# Patient Record
Sex: MALE | Race: WHITE | NOT HISPANIC OR LATINO | Employment: OTHER | URBAN - METROPOLITAN AREA
[De-identification: names, ages, dates, MRNs, and addresses within clinical notes are randomized per-mention and may not be internally consistent; named-entity substitution may affect disease eponyms.]

---

## 2017-01-04 ENCOUNTER — ALLSCRIPTS OFFICE VISIT (OUTPATIENT)
Dept: WOUND CARE | Facility: HOSPITAL | Age: 52
End: 2017-01-04
Payer: COMMERCIAL

## 2017-01-04 PROCEDURE — 11042 DBRDMT SUBQ TIS 1ST 20SQCM/<: CPT | Performed by: REGISTERED NURSE

## 2017-01-06 ENCOUNTER — GENERIC CONVERSION - ENCOUNTER (OUTPATIENT)
Dept: OTHER | Facility: OTHER | Age: 52
End: 2017-01-06

## 2017-01-11 ENCOUNTER — ALLSCRIPTS OFFICE VISIT (OUTPATIENT)
Dept: OTHER | Facility: OTHER | Age: 52
End: 2017-01-11

## 2017-01-12 DIAGNOSIS — M18.9 OSTEOARTHRITIS OF FIRST CARPOMETACARPAL JOINT: ICD-10-CM

## 2017-01-16 ENCOUNTER — ALLSCRIPTS OFFICE VISIT (OUTPATIENT)
Dept: WOUND CARE | Facility: HOSPITAL | Age: 52
End: 2017-01-16
Payer: COMMERCIAL

## 2017-01-16 PROCEDURE — 99212 OFFICE O/P EST SF 10 MIN: CPT | Performed by: SURGERY

## 2017-01-26 ENCOUNTER — APPOINTMENT (OUTPATIENT)
Dept: OCCUPATIONAL THERAPY | Facility: CLINIC | Age: 52
End: 2017-01-26
Payer: COMMERCIAL

## 2017-01-26 ENCOUNTER — GENERIC CONVERSION - ENCOUNTER (OUTPATIENT)
Dept: OTHER | Facility: OTHER | Age: 52
End: 2017-01-26

## 2017-01-26 PROCEDURE — L3913 HFO W/O JOINTS CF: HCPCS

## 2017-01-26 PROCEDURE — 97165 OT EVAL LOW COMPLEX 30 MIN: CPT

## 2017-01-31 ENCOUNTER — APPOINTMENT (OUTPATIENT)
Dept: OCCUPATIONAL THERAPY | Facility: CLINIC | Age: 52
End: 2017-01-31
Payer: COMMERCIAL

## 2017-01-31 PROCEDURE — 97110 THERAPEUTIC EXERCISES: CPT

## 2017-01-31 PROCEDURE — 97140 MANUAL THERAPY 1/> REGIONS: CPT

## 2017-01-31 PROCEDURE — 97010 HOT OR COLD PACKS THERAPY: CPT

## 2017-02-02 ENCOUNTER — APPOINTMENT (OUTPATIENT)
Dept: OCCUPATIONAL THERAPY | Facility: CLINIC | Age: 52
End: 2017-02-02
Payer: COMMERCIAL

## 2017-02-02 PROCEDURE — 97140 MANUAL THERAPY 1/> REGIONS: CPT

## 2017-02-02 PROCEDURE — 97110 THERAPEUTIC EXERCISES: CPT

## 2017-02-02 PROCEDURE — 97010 HOT OR COLD PACKS THERAPY: CPT

## 2017-02-07 ENCOUNTER — APPOINTMENT (OUTPATIENT)
Dept: OCCUPATIONAL THERAPY | Facility: CLINIC | Age: 52
End: 2017-02-07
Payer: COMMERCIAL

## 2017-02-07 PROCEDURE — 97110 THERAPEUTIC EXERCISES: CPT

## 2017-02-07 PROCEDURE — 97010 HOT OR COLD PACKS THERAPY: CPT

## 2017-02-07 PROCEDURE — 97140 MANUAL THERAPY 1/> REGIONS: CPT

## 2017-02-09 ENCOUNTER — APPOINTMENT (OUTPATIENT)
Dept: OCCUPATIONAL THERAPY | Facility: CLINIC | Age: 52
End: 2017-02-09
Payer: COMMERCIAL

## 2017-02-10 ENCOUNTER — APPOINTMENT (OUTPATIENT)
Dept: OCCUPATIONAL THERAPY | Facility: CLINIC | Age: 52
End: 2017-02-10
Payer: COMMERCIAL

## 2017-02-10 PROCEDURE — 97140 MANUAL THERAPY 1/> REGIONS: CPT

## 2017-02-10 PROCEDURE — 97110 THERAPEUTIC EXERCISES: CPT

## 2017-02-13 ENCOUNTER — ALLSCRIPTS OFFICE VISIT (OUTPATIENT)
Dept: OTHER | Facility: OTHER | Age: 52
End: 2017-02-13

## 2017-02-14 ENCOUNTER — APPOINTMENT (OUTPATIENT)
Dept: OCCUPATIONAL THERAPY | Facility: CLINIC | Age: 52
End: 2017-02-14
Payer: COMMERCIAL

## 2017-02-14 PROCEDURE — 97110 THERAPEUTIC EXERCISES: CPT

## 2017-02-14 PROCEDURE — 97010 HOT OR COLD PACKS THERAPY: CPT

## 2017-02-14 PROCEDURE — 97140 MANUAL THERAPY 1/> REGIONS: CPT

## 2017-02-15 ENCOUNTER — GENERIC CONVERSION - ENCOUNTER (OUTPATIENT)
Dept: OTHER | Facility: OTHER | Age: 52
End: 2017-02-15

## 2017-02-16 ENCOUNTER — APPOINTMENT (OUTPATIENT)
Dept: OCCUPATIONAL THERAPY | Facility: CLINIC | Age: 52
End: 2017-02-16
Payer: COMMERCIAL

## 2017-02-22 ENCOUNTER — ALLSCRIPTS OFFICE VISIT (OUTPATIENT)
Dept: OTHER | Facility: OTHER | Age: 52
End: 2017-02-22

## 2017-02-22 ENCOUNTER — HOSPITAL ENCOUNTER (OUTPATIENT)
Dept: RADIOLOGY | Facility: CLINIC | Age: 52
Discharge: HOME | End: 2017-02-22
Payer: COMMERCIAL

## 2017-02-22 DIAGNOSIS — M79.642 PAIN OF LEFT HAND: ICD-10-CM

## 2017-02-22 DIAGNOSIS — M18.9 OSTEOARTHRITIS OF FIRST CARPOMETACARPAL JOINT: ICD-10-CM

## 2017-02-22 DIAGNOSIS — M79.641 PAIN OF RIGHT HAND: ICD-10-CM

## 2017-02-22 DIAGNOSIS — Z79.899 OTHER LONG TERM (CURRENT) DRUG THERAPY: ICD-10-CM

## 2017-02-22 DIAGNOSIS — F33.2 MAJOR DEPRESSIVE DISORDER, RECURRENT SEVERE WITHOUT PSYCHOTIC FEATURES (HCC): ICD-10-CM

## 2017-02-22 PROCEDURE — 73140 X-RAY EXAM OF FINGER(S): CPT

## 2017-02-28 ENCOUNTER — ALLSCRIPTS OFFICE VISIT (OUTPATIENT)
Dept: OTHER | Facility: OTHER | Age: 52
End: 2017-02-28

## 2017-03-07 ENCOUNTER — GENERIC CONVERSION - ENCOUNTER (OUTPATIENT)
Dept: OTHER | Facility: OTHER | Age: 52
End: 2017-03-07

## 2017-03-08 ENCOUNTER — GENERIC CONVERSION - ENCOUNTER (OUTPATIENT)
Dept: OTHER | Facility: OTHER | Age: 52
End: 2017-03-08

## 2017-03-22 ENCOUNTER — ALLSCRIPTS OFFICE VISIT (OUTPATIENT)
Dept: OTHER | Facility: OTHER | Age: 52
End: 2017-03-22

## 2017-03-30 ENCOUNTER — GENERIC CONVERSION - ENCOUNTER (OUTPATIENT)
Dept: OTHER | Facility: OTHER | Age: 52
End: 2017-03-30

## 2017-03-31 ENCOUNTER — GENERIC CONVERSION - ENCOUNTER (OUTPATIENT)
Dept: OTHER | Facility: OTHER | Age: 52
End: 2017-03-31

## 2017-04-11 ENCOUNTER — ALLSCRIPTS OFFICE VISIT (OUTPATIENT)
Dept: OTHER | Facility: OTHER | Age: 52
End: 2017-04-11

## 2017-04-11 DIAGNOSIS — F33.2 MAJOR DEPRESSIVE DISORDER, RECURRENT SEVERE WITHOUT PSYCHOTIC FEATURES (HCC): ICD-10-CM

## 2017-04-11 DIAGNOSIS — Z79.899 OTHER LONG TERM (CURRENT) DRUG THERAPY: ICD-10-CM

## 2017-04-20 ENCOUNTER — GENERIC CONVERSION - ENCOUNTER (OUTPATIENT)
Dept: OTHER | Facility: OTHER | Age: 52
End: 2017-04-20

## 2017-05-09 ENCOUNTER — ALLSCRIPTS OFFICE VISIT (OUTPATIENT)
Dept: OTHER | Facility: OTHER | Age: 52
End: 2017-05-09

## 2017-05-22 ENCOUNTER — APPOINTMENT (OUTPATIENT)
Dept: LAB | Facility: CLINIC | Age: 52
End: 2017-05-22
Payer: COMMERCIAL

## 2017-05-22 ENCOUNTER — ALLSCRIPTS OFFICE VISIT (OUTPATIENT)
Dept: OTHER | Facility: OTHER | Age: 52
End: 2017-05-22

## 2017-05-22 DIAGNOSIS — Z79.899 OTHER LONG TERM (CURRENT) DRUG THERAPY: ICD-10-CM

## 2017-05-22 DIAGNOSIS — F33.2 MAJOR DEPRESSIVE DISORDER, RECURRENT SEVERE WITHOUT PSYCHOTIC FEATURES (HCC): ICD-10-CM

## 2017-05-22 LAB
ALBUMIN SERPL BCP-MCNC: 3.6 G/DL (ref 3.5–5)
ALP SERPL-CCNC: 69 U/L (ref 46–116)
ALT SERPL W P-5'-P-CCNC: 27 U/L (ref 12–78)
ANION GAP SERPL CALCULATED.3IONS-SCNC: 9 MMOL/L (ref 4–13)
AST SERPL W P-5'-P-CCNC: 20 U/L (ref 5–45)
BASOPHILS # BLD AUTO: 0.03 THOUSANDS/ΜL (ref 0–0.1)
BASOPHILS NFR BLD AUTO: 1 % (ref 0–1)
BILIRUB SERPL-MCNC: 0.9 MG/DL (ref 0.2–1)
BUN SERPL-MCNC: 20 MG/DL (ref 5–25)
CALCIUM SERPL-MCNC: 8.4 MG/DL (ref 8.3–10.1)
CHLORIDE SERPL-SCNC: 104 MMOL/L (ref 100–108)
CHOLEST SERPL-MCNC: 160 MG/DL (ref 50–200)
CO2 SERPL-SCNC: 26 MMOL/L (ref 21–32)
CREAT SERPL-MCNC: 1.21 MG/DL (ref 0.6–1.3)
EOSINOPHIL # BLD AUTO: 0.13 THOUSAND/ΜL (ref 0–0.61)
EOSINOPHIL NFR BLD AUTO: 2 % (ref 0–6)
ERYTHROCYTE [DISTWIDTH] IN BLOOD BY AUTOMATED COUNT: 13.3 % (ref 11.6–15.1)
GFR SERPL CREATININE-BSD FRML MDRD: >60 ML/MIN/1.73SQ M
GLUCOSE P FAST SERPL-MCNC: 103 MG/DL (ref 65–99)
HCT VFR BLD AUTO: 42.2 % (ref 36.5–49.3)
HDLC SERPL-MCNC: 35 MG/DL (ref 40–60)
HGB BLD-MCNC: 14.5 G/DL (ref 12–17)
LDLC SERPL CALC-MCNC: 99 MG/DL (ref 0–100)
LYMPHOCYTES # BLD AUTO: 1.98 THOUSANDS/ΜL (ref 0.6–4.47)
LYMPHOCYTES NFR BLD AUTO: 30 % (ref 14–44)
MCH RBC QN AUTO: 29.2 PG (ref 26.8–34.3)
MCHC RBC AUTO-ENTMCNC: 34.4 G/DL (ref 31.4–37.4)
MCV RBC AUTO: 85 FL (ref 82–98)
MONOCYTES # BLD AUTO: 0.64 THOUSAND/ΜL (ref 0.17–1.22)
MONOCYTES NFR BLD AUTO: 10 % (ref 4–12)
NEUTROPHILS # BLD AUTO: 3.87 THOUSANDS/ΜL (ref 1.85–7.62)
NEUTS SEG NFR BLD AUTO: 57 % (ref 43–75)
PLATELET # BLD AUTO: 264 THOUSANDS/UL (ref 149–390)
PMV BLD AUTO: 9.4 FL (ref 8.9–12.7)
POTASSIUM SERPL-SCNC: 4 MMOL/L (ref 3.5–5.3)
PROT SERPL-MCNC: 7 G/DL (ref 6.4–8.2)
RBC # BLD AUTO: 4.97 MILLION/UL (ref 3.88–5.62)
SODIUM SERPL-SCNC: 139 MMOL/L (ref 136–145)
TRIGL SERPL-MCNC: 131 MG/DL
TSH SERPL DL<=0.05 MIU/L-ACNC: 2.57 UIU/ML (ref 0.36–3.74)
WBC # BLD AUTO: 6.65 THOUSAND/UL (ref 4.31–10.16)

## 2017-05-22 PROCEDURE — 80053 COMPREHEN METABOLIC PANEL: CPT

## 2017-05-22 PROCEDURE — 85025 COMPLETE CBC W/AUTO DIFF WBC: CPT

## 2017-05-22 PROCEDURE — 84443 ASSAY THYROID STIM HORMONE: CPT

## 2017-05-22 PROCEDURE — 80061 LIPID PANEL: CPT

## 2017-05-22 PROCEDURE — 36415 COLL VENOUS BLD VENIPUNCTURE: CPT

## 2017-05-24 ENCOUNTER — GENERIC CONVERSION - ENCOUNTER (OUTPATIENT)
Dept: OTHER | Facility: OTHER | Age: 52
End: 2017-05-24

## 2017-05-26 ENCOUNTER — ALLSCRIPTS OFFICE VISIT (OUTPATIENT)
Dept: OTHER | Facility: OTHER | Age: 52
End: 2017-05-26

## 2017-05-30 ENCOUNTER — ALLSCRIPTS OFFICE VISIT (OUTPATIENT)
Dept: RADIOLOGY | Facility: CLINIC | Age: 52
End: 2017-05-30
Payer: COMMERCIAL

## 2017-06-08 ENCOUNTER — ALLSCRIPTS OFFICE VISIT (OUTPATIENT)
Dept: OTHER | Facility: OTHER | Age: 52
End: 2017-06-08

## 2017-06-15 ENCOUNTER — GENERIC CONVERSION - ENCOUNTER (OUTPATIENT)
Dept: OTHER | Facility: OTHER | Age: 52
End: 2017-06-15

## 2017-06-23 ENCOUNTER — GENERIC CONVERSION - ENCOUNTER (OUTPATIENT)
Dept: OTHER | Facility: OTHER | Age: 52
End: 2017-06-23

## 2017-07-13 ENCOUNTER — ALLSCRIPTS OFFICE VISIT (OUTPATIENT)
Dept: OTHER | Facility: OTHER | Age: 52
End: 2017-07-13

## 2017-07-27 ENCOUNTER — ALLSCRIPTS OFFICE VISIT (OUTPATIENT)
Dept: OTHER | Facility: OTHER | Age: 52
End: 2017-07-27

## 2017-07-28 ENCOUNTER — GENERIC CONVERSION - ENCOUNTER (OUTPATIENT)
Dept: OTHER | Facility: OTHER | Age: 52
End: 2017-07-28

## 2017-08-09 ENCOUNTER — ALLSCRIPTS OFFICE VISIT (OUTPATIENT)
Dept: OTHER | Facility: OTHER | Age: 52
End: 2017-08-09

## 2017-08-09 DIAGNOSIS — K42.9 UMBILICAL HERNIA WITHOUT OBSTRUCTION OR GANGRENE: ICD-10-CM

## 2017-08-09 DIAGNOSIS — E66.01 MORBID (SEVERE) OBESITY DUE TO EXCESS CALORIES (HCC): ICD-10-CM

## 2017-08-09 DIAGNOSIS — G56.03 BILATERAL CARPAL TUNNEL SYNDROME: ICD-10-CM

## 2017-08-22 ENCOUNTER — ANESTHESIA EVENT (OUTPATIENT)
Dept: GASTROENTEROLOGY | Facility: HOSPITAL | Age: 52
End: 2017-08-22
Payer: COMMERCIAL

## 2017-08-23 ENCOUNTER — HOSPITAL ENCOUNTER (OUTPATIENT)
Facility: HOSPITAL | Age: 52
Setting detail: OUTPATIENT SURGERY
Discharge: HOME | End: 2017-08-23
Attending: SURGERY | Admitting: SURGERY
Payer: COMMERCIAL

## 2017-08-23 ENCOUNTER — APPOINTMENT (OUTPATIENT)
Dept: LAB | Facility: HOSPITAL | Age: 52
End: 2017-08-23
Attending: SURGERY
Payer: COMMERCIAL

## 2017-08-23 ENCOUNTER — ANESTHESIA (OUTPATIENT)
Dept: GASTROENTEROLOGY | Facility: HOSPITAL | Age: 52
End: 2017-08-23
Payer: COMMERCIAL

## 2017-08-23 VITALS
TEMPERATURE: 98.2 F | OXYGEN SATURATION: 98 % | SYSTOLIC BLOOD PRESSURE: 104 MMHG | HEART RATE: 82 BPM | RESPIRATION RATE: 14 BRPM | DIASTOLIC BLOOD PRESSURE: 61 MMHG

## 2017-08-23 DIAGNOSIS — E66.01 MORBID (SEVERE) OBESITY DUE TO EXCESS CALORIES (HCC): ICD-10-CM

## 2017-08-23 DIAGNOSIS — K42.9 UMBILICAL HERNIA WITHOUT OBSTRUCTION OR GANGRENE: ICD-10-CM

## 2017-08-23 DIAGNOSIS — G56.03 BILATERAL CARPAL TUNNEL SYNDROME: ICD-10-CM

## 2017-08-23 LAB
ALBUMIN SERPL BCP-MCNC: 3.4 G/DL (ref 3.5–5)
ALP SERPL-CCNC: 68 U/L (ref 46–116)
ALT SERPL W P-5'-P-CCNC: 31 U/L (ref 12–78)
ANION GAP SERPL CALCULATED.3IONS-SCNC: 6 MMOL/L (ref 4–13)
AST SERPL W P-5'-P-CCNC: 27 U/L (ref 5–45)
BILIRUB SERPL-MCNC: 0.53 MG/DL (ref 0.2–1)
BUN SERPL-MCNC: 19 MG/DL (ref 5–25)
CALCIUM SERPL-MCNC: 8.5 MG/DL (ref 8.3–10.1)
CHLORIDE SERPL-SCNC: 106 MMOL/L (ref 100–108)
CHOLEST SERPL-MCNC: 157 MG/DL (ref 50–200)
CO2 SERPL-SCNC: 28 MMOL/L (ref 21–32)
CREAT SERPL-MCNC: 1.15 MG/DL (ref 0.6–1.3)
ERYTHROCYTE [DISTWIDTH] IN BLOOD BY AUTOMATED COUNT: 13.4 % (ref 11.6–15.1)
GFR SERPL CREATININE-BSD FRML MDRD: 74 ML/MIN/1.73SQ M
GLUCOSE P FAST SERPL-MCNC: 104 MG/DL (ref 65–99)
HCT VFR BLD AUTO: 41.9 % (ref 36.5–49.3)
HDLC SERPL-MCNC: 27 MG/DL (ref 40–60)
HGB BLD-MCNC: 14 G/DL (ref 12–17)
LDLC SERPL CALC-MCNC: 95 MG/DL (ref 0–100)
MCH RBC QN AUTO: 29.1 PG (ref 26.8–34.3)
MCHC RBC AUTO-ENTMCNC: 33.4 G/DL (ref 31.4–37.4)
MCV RBC AUTO: 87 FL (ref 82–98)
PLATELET # BLD AUTO: 278 THOUSANDS/UL (ref 149–390)
PMV BLD AUTO: 10 FL (ref 8.9–12.7)
POTASSIUM SERPL-SCNC: 4.5 MMOL/L (ref 3.5–5.3)
PROT SERPL-MCNC: 7 G/DL (ref 6.4–8.2)
RBC # BLD AUTO: 4.81 MILLION/UL (ref 3.88–5.62)
SODIUM SERPL-SCNC: 140 MMOL/L (ref 136–145)
TRIGL SERPL-MCNC: 174 MG/DL
TSH SERPL DL<=0.05 MIU/L-ACNC: 1.91 UIU/ML (ref 0.36–3.74)
WBC # BLD AUTO: 7.24 THOUSAND/UL (ref 4.31–10.16)

## 2017-08-23 PROCEDURE — 80061 LIPID PANEL: CPT

## 2017-08-23 PROCEDURE — 88342 IMHCHEM/IMCYTCHM 1ST ANTB: CPT | Performed by: SURGERY

## 2017-08-23 PROCEDURE — 88305 TISSUE EXAM BY PATHOLOGIST: CPT | Performed by: SURGERY

## 2017-08-23 PROCEDURE — 36415 COLL VENOUS BLD VENIPUNCTURE: CPT

## 2017-08-23 PROCEDURE — 80053 COMPREHEN METABOLIC PANEL: CPT

## 2017-08-23 PROCEDURE — 84443 ASSAY THYROID STIM HORMONE: CPT

## 2017-08-23 PROCEDURE — 85027 COMPLETE CBC AUTOMATED: CPT

## 2017-08-23 RX ORDER — LIDOCAINE HYDROCHLORIDE 10 MG/ML
INJECTION, SOLUTION INFILTRATION; PERINEURAL AS NEEDED
Status: DISCONTINUED | OUTPATIENT
Start: 2017-08-23 | End: 2017-08-23 | Stop reason: SURG

## 2017-08-23 RX ORDER — SODIUM CHLORIDE 9 MG/ML
125 INJECTION, SOLUTION INTRAVENOUS CONTINUOUS
Status: DISCONTINUED | OUTPATIENT
Start: 2017-08-23 | End: 2017-08-23 | Stop reason: HOSPADM

## 2017-08-23 RX ORDER — PROPOFOL 10 MG/ML
INJECTION, EMULSION INTRAVENOUS AS NEEDED
Status: DISCONTINUED | OUTPATIENT
Start: 2017-08-23 | End: 2017-08-23 | Stop reason: SURG

## 2017-08-23 RX ADMIN — PROPOFOL 200 MG: 10 INJECTION, EMULSION INTRAVENOUS at 11:18

## 2017-08-23 RX ADMIN — PROPOFOL 50 MG: 10 INJECTION, EMULSION INTRAVENOUS at 11:20

## 2017-08-23 RX ADMIN — SODIUM CHLORIDE 125 ML/HR: 0.9 INJECTION, SOLUTION INTRAVENOUS at 10:52

## 2017-08-23 RX ADMIN — LIDOCAINE HYDROCHLORIDE 100 MG: 10 INJECTION, SOLUTION INFILTRATION; PERINEURAL at 11:18

## 2017-08-30 ENCOUNTER — APPOINTMENT (OUTPATIENT)
Dept: LAB | Facility: CLINIC | Age: 52
End: 2017-08-30
Payer: COMMERCIAL

## 2017-08-30 ENCOUNTER — ALLSCRIPTS OFFICE VISIT (OUTPATIENT)
Dept: OTHER | Facility: OTHER | Age: 52
End: 2017-08-30

## 2017-08-30 ENCOUNTER — TRANSCRIBE ORDERS (OUTPATIENT)
Dept: LAB | Facility: CLINIC | Age: 52
End: 2017-08-30

## 2017-08-30 DIAGNOSIS — F33.2 MAJOR DEPRESSIVE DISORDER, RECURRENT SEVERE WITHOUT PSYCHOTIC FEATURES (HCC): Primary | ICD-10-CM

## 2017-08-30 DIAGNOSIS — F41.1 GAD (GENERALIZED ANXIETY DISORDER): ICD-10-CM

## 2017-08-30 DIAGNOSIS — Z13.9 SCREENING FOR CONDITION: Primary | ICD-10-CM

## 2017-08-30 DIAGNOSIS — G47.09 OTHER ORGANIC INSOMNIA: ICD-10-CM

## 2017-08-30 DIAGNOSIS — Z13.9 SCREENING FOR CONDITION: ICD-10-CM

## 2017-08-30 LAB
EST. AVERAGE GLUCOSE BLD GHB EST-MCNC: 114 MG/DL
HBA1C MFR BLD: 5.6 % (ref 4.2–6.3)

## 2017-08-30 PROCEDURE — 83036 HEMOGLOBIN GLYCOSYLATED A1C: CPT

## 2017-08-30 PROCEDURE — 36415 COLL VENOUS BLD VENIPUNCTURE: CPT

## 2017-09-13 ENCOUNTER — GENERIC CONVERSION - ENCOUNTER (OUTPATIENT)
Dept: OTHER | Facility: OTHER | Age: 52
End: 2017-09-13

## 2017-09-14 ENCOUNTER — GENERIC CONVERSION - ENCOUNTER (OUTPATIENT)
Dept: OTHER | Facility: OTHER | Age: 52
End: 2017-09-14

## 2017-10-25 ENCOUNTER — GENERIC CONVERSION - ENCOUNTER (OUTPATIENT)
Dept: OTHER | Facility: OTHER | Age: 52
End: 2017-10-25

## 2018-01-11 NOTE — PSYCH
Psych Med Mgmt    Appearance: was calm and cooperative, adequate hygiene and grooming, demonstrated behavior psychomotor retardation and good eye contact  Observed mood: depressed and anxious  Observed mood: affect was blunted  Speech: slowed, but speech soft  Thought processes: coherent/organized  Hallucinations: no hallucinations present  Thought Content: no delusions  Abnormal Thoughts: The patient has no suicidal thoughts and no homicidal thoughts  Orientation: The patient is oriented to person, place and time  Recent and Remote Memory: short term memory intact and long term memory intact  Attention Span And Concentration: concentration impaired  Insight: Insight intact  Judgment: His judgment was intact  Muscle Strength And Tone  Muscle strength and tone were normal  Normal gait and station  Language: no difficulty naming common objects, no difficulty repeating a phrase and no difficulty writing a sentence  Fund of knowledge: Patient displays adequate knowledge of current events, adequate fund of knowledge regarding past history and adequate fund of knowledge regarding vocabulary  The patient is experiencing moderate to severe pain  On a scale of 0 - 10 the pain severity is a 6  Treatment Recommendations: Continue Wellbutrin  mg daily  Continue Lexapro 20 mg daily  Patient will again consider trial of Abilify 2 mg at bedtime to help with depression and nightmares - has new insurance now  Informed we will squires to complete Prior Authorization if needed  Continue Ambien 10 mg at bedtime as needed  Therapy with own therapist  Follows with PCP for lipid and glucose monitoring  Abdominal circumference is 53 inches today  Risks, Benefits And Possible Side Effects Of Medications: Risks, benefits, and possible side effects of medications explained to patient and patient verbalizes understanding                   The patient has been filling controlled prescriptions on time as prescribed to [facility]       He reports normal appetite, decreased energy, recent 5 lbs weight loss and normal number of sleep hours  Elli Chavez states he still has been feeling depressed on and off  Frustrated with ongoing chronic pain, overwhelmed with paperwork from his  regarding his past termination from work  Feels anxious often  Denies suicidal or homicidal ideation  No psychotic symptoms  Sleep fluctuates, has restless legs at night  Occasional nightmares  Appetite is good  Involved with bariatric program   No side effects from medications reported  Was not able to get Abilify filled due to his insurance not covering Abilify  PHQ-9 is increased today at 17 (from 17 at the last visit)  Labs reviewed from 5/22/2017 - lipid panel significant for low HDL of 35; TSH is normal, CBC is normal; CMP significant for slightly elevated glucose of 103  Vitals  Signs   Recorded: 93CUB6905 02:35PM   Heart Rate: 89  Systolic: 331  Diastolic: 73  BP Cuff Size: Large  Height: 5 ft 5 in  Weight: 290 lb   BMI Calculated: 48 26  BSA Calculated: 2 32    Assessment    1  ALYSHA (generalized anxiety disorder) (300 02) (F41 1)   2  Major depressive disorder, recurrent, severe without psychotic behavior (296 33) (F33 2)   3  Other insomnia (780 52) (G47 09)    Plan    1  Follow-up visit in 3 weeks Evaluation and Treatment  Follow-up  Status: Hold For -   Scheduling  Requested for: 11CXG1194    Review of Systems    Constitutional: feeling tired and recent 5 lb weight loss  Cardiovascular: no complaints of slow or fast heart rate, no chest pain, no palpitations  Respiratory: no complaints of shortness of breath, no wheezing, no dyspnea on exertion  Gastrointestinal: abdominal pain  Genitourinary: no complaints of dysuria, no incontinence, no pelvic pain, no urinary frequency  Musculoskeletal: rib pain  Integumentary: no complaints of skin rash, no itching, no dry skin  Neurological: no complaints of headache, no confusion, no numbness, no dizziness  Past Psychiatric History    Past Psychiatric History: History of depression since 2006  No history of inpatient psychiatric admissions  Was in treatment with Dr Kj Maddox for over 1 year and with PCP for several years prior to that  Has a therapist   No history of past suicide attempts  Past medication trials with Wellbutrin, Lexapro, Ambien  Substance Abuse Hx    Substance Abuse History: Social alcohol use 1 drink per month  No recreational drug use  No tobacco           Active Problems    1  Adrenal adenoma (227 0) (D35 00)   2  Bilateral carpal tunnel syndrome (354 0) (G56 03)   3  Carpal tunnel syndrome of right wrist (354 0) (G56 01)   4  Chronic right-sided thoracic back pain (724 1,338 29) (M54 6,G89 29)   5  CMC arthritis, thumb, degenerative (715 34) (M18 9)   6  Cubital tunnel syndrome (354 2) (G56 20)   7  Cubital tunnel syndrome of both upper extremities (354 2) (G56 23)   8  Elbow pain (719 42) (M25 529)   9  ALYSHA (generalized anxiety disorder) (300 02) (F41 1)   10  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)   11  Long term current use of therapeutic drug (V58 69) (Z79 899)   12  Lumbago (724 2) (M54 5)   13  Major depressive disorder, recurrent, severe without psychotic behavior (296 33) (F33 2)   14  Median nerve neuritis, unspecified laterality (354 1) (G56 10)   15  Morbid obesity (278 01) (E66 01)   16  Myofascial pain syndrome (729 1) (M79 1)   17  Other insomnia (780 52) (G47 09)   18  Pain in both hands (729 5) (M79 641,M79 642)   19  Postoperative examination (V67 00) (Z09)   20  Postoperative wound dehiscence, initial encounter (998 32) (T81 31XA)   21  Postoperative wound dehiscence, subsequent encounter (V58 89,998 32) (T81 31XD)   22  Pre-operative clearance (V72 84) (Z01 818)   23  Sleep apnea (780 57) (G47 30)   24  Thoracic compression fracture (805 2) (S22 000A)   25   Tinea capitis (110 0) (B35 0)   26  Ulnar 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 of left upper extremity (354 2) (G56 22)   27  Umbilical hernia (462 5) (K42 9)   28  Vitamin D deficiency (268 9) (E55 9)    Past Medical History    1  History of Anxiety (300 00) (F41 9)   2  History of depression (V11 8) (Z86 59)   3  History of folliculitis (V07 2) (I56 9)   4  Denied: History of head injury   5  History of Knee pain (719 46) (M25 569)   6  History of Liver lesion (573 8) (K76 9)   7  History of Neck pain (723 1) (M54 2)   8  History of Nodular adrenal cortex (255 8) (E27 8)   9  History of Pain, wrist joint (719 43) (M25 539)   10  Denied: History of Seizures   11  History of Skin lesion of scalp (709 9) (L98 9)   12  History of Status post hernia repair (V45 89) (Z98 890,Z87 19)    The active problems and past medical history were reviewed and updated today  Allergies    1  Penicillins    Current Meds   1  ARIPiprazole 2 MG Oral Tablet; TAKE 1 TABLET AT BEDTIME; Therapy: 68Cni6592 to (Evaluate:09Aug2017)  Requested for: 01Rof9085; Last   Rx:11Apr2017 Ordered   2  BuPROPion HCl ER (XL) 150 MG Oral Tablet Extended Release 24 Hour; take 3 tablets   daily; Therapy: 11RLP7497 to (Evaluate:09Aug2017)  Requested for: 58Lxg3857; Last   Rx:39Yec9464 Ordered   3  Escitalopram Oxalate 20 MG Oral Tablet; TAKE 1 TABLET DAILY; Therapy: 76ZAG7944 to (Evaluate:95Uyc7778)  Requested for: 64Hnm3029; Last   Rx:35Vrb5542 Ordered   4  Vitamin B6 200 MG Oral Tablet; TAKE 1 TABLET DAILY AS DIRECTED; Therapy: 47DDE8282 to (Evaluate:14Rjy4478)  Requested for: 09CTJ2618; Last   SY:71MTB5263 Ordered   5  Vitamin D (Ergocalciferol) 36413 UNIT Oral Capsule; TAKE 1 CAPSULE WEEKLY; Therapy: 10MFV1629 to (Evaluate:28Jun2017)  Requested for: 45GSL5119; Last   Rx:45Zox4223 Ordered   6  Zolpidem Tartrate 10 MG Oral Tablet; TAKE 1 TABLET AT BEDTIME AS NEEDED; Therapy: 04PCL9024 to (Evaluate:15Swm2864);  Last Rx:78Bse7022 Ordered    The medication list was reviewed and updated today  Family Psych History  Mother    1  Family history of malignant neoplasm (V16 9) (Z80 9)   2  Denied: FH: mental illness  Father    3  Family history of arthritis (V17 7) (Z82 61)   4  Family history of osteoporosis (V17 81) (Z82 62)   5  Denied: FH: mental illness  Sister    6  Family history of depression (V17 0) (Z81 8)   7  Family history of suicide attempt (V17 0) (Z81 8)  Maternal Grandmother    8  Family history of malignant neoplasm (V16 9) (Z80 9)  Maternal Grandfather    9  Family history of malignant neoplasm (V16 9) (Z80 9)  Family History    10  Family history of Arthritis (716 90) (M19 90)   11  Family history of Back problem   12  Family history of Cancer (199 1) (C80 1)    The family history was reviewed and updated today  Social History    · Caffeine use (V49 89) (F15 90)   · Never a smoker   · No illicit drug use   · Social drinker  The social history was reviewed and updated today  , lives with parents  Has one adult son  Currently unemployed, 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  Some college  No history of legal problems  No  history  History Of Phys/Sex Abuse Or Perpetration    History Of Phys/Sex Abuse or Perpetration: No history of physical or sexual abuse  End of Encounter Meds    1  Escitalopram Oxalate 20 MG Oral Tablet; TAKE 1 TABLET DAILY; Therapy: 21DIP0690 to (Evaluate:09Aug2017)  Requested for: 11Apr2017; Last   Rx:11Apr2017 Ordered    2  ARIPiprazole 2 MG Oral Tablet; TAKE 1 TABLET AT BEDTIME; Therapy: 11Apr2017 to (Evaluate:09Aug2017)  Requested for: 11Apr2017; Last   Rx:11Apr2017 Ordered   3  BuPROPion HCl ER (XL) 150 MG Oral Tablet Extended Release 24 Hour; take 3 tablets   daily; Therapy: 48OOH4848 to (Evaluate:09Aug2017)  Requested for: 11Apr2017; Last   Rx:11Apr2017 Ordered    4  Vitamin B6 200 MG Oral Tablet; TAKE 1 TABLET DAILY AS DIRECTED;    Therapy: 63NMR4897 to (Evaluate:07Aug2017)  Requested for: 91LYP7268; Last   WK:80YWI3297 Ordered    5  Zolpidem Tartrate 10 MG Oral Tablet; TAKE 1 TABLET AT BEDTIME AS NEEDED; Therapy: 58LLN5473 to (Evaluate:12Byj0661); Last Rx:51Obu0241 Ordered    6  Vitamin D (Ergocalciferol) 01272 UNIT Oral Capsule; TAKE 1 CAPSULE WEEKLY; Therapy: 44RZJ4558 to (Evaluate:28Jun2017)  Requested for: 39Ndc4343; Last   Rx:93Pjn2725 Ordered    Future Appointments    Date/Time Provider Specialty Site   01/03/2018 07:45 AM Tom Russell, [address] Endocrinology [facility] ENDOCRINOLOGY [facility]   06/08/2017 01:50 PM HAMIDA Trimble   Orthopedic Surgery [facility]   05/30/2017 08:45 AM Diya Lacy MD Pain Management [address]     Signatures   Electronically signed by : Myrene Fothergill, M D ; May 26 2017  2:59PM EST                       (Author)

## 2018-01-11 NOTE — CONSULTS
Therapy  Rehabilitation Services Referral:   Patient Status: routine  Diagnosis: Left thoracolumbar myofascial pain  Rehabilitation Services: evaluate and treat patient as needed and initiate a home exercise program    Frequency: 3 times per week, for 8 weeks  Please send progress report  Signatures   Electronically signed by :  Anna Workman, ; Jan 29 2016  2:08PM EST                       (Author)    Electronically signed by : HAMIDA Sanchez ; Jan 29 2016  2:46PM EST                       (Author)

## 2018-01-11 NOTE — PROGRESS NOTES
Assessment    1  Lumbago (724 2) (M54 5)    Plan  Adrenal adenoma, Health Maintenance, Lumbago    · *1 - [facility] Physical Therapy  Consult  Status: Hold For - Scheduling   Requested for: 08WRQ8876  Care Summary provided  : Yes   · Follow-up PRN Evaluation and Treatment  Follow-up  Status: Complete  Done:  34YGJ3449  Lumbago    · MethylPREDNISolone (Isrrael) 4 MG Oral Tablet   · Naproxen 500 MG Oral Tablet (Naprosyn)    Discussion/Summary    57-year-old male presents with improving  Myofascial thoracic lumbar pain on the left side  Patient notes that physical therapy has improved since the past and he would like to try physical therapy again  We will provide him with a prescription for 8 more weeks of physical therapy  If this fails to improve his symptoms, he can call and we will refer him to pain management for trigger point injections  Patient to follow-up when necessary  History of Present Illness  HPI: 57-year-old male presents for repeat evaluation of thoracic or lumbar pain  Patient notes that he has completed 6 weeks of physical therapy which has decreased his symptoms drastically  He no longer complains of lumbar pain only a left sided point tenderness which has been decreasing as he continued physical therapy  Patient denies any lower extremity symptoms  Lumbar symptoms or upper extremity symptoms at this time      Review of Systems    Constitutional: No fever or chills, feels well, no tiredness, no recent weight loss or weight gain  Eyes: No complaints of red eyes, no eyesight problems  ENT: no complaints of loss of hearing, no nosebleeds, no sore throat  Cardiovascular: No complaints of chest pain, no palpitations, no leg claudication or lower extremity edema  Respiratory: No complaints of shortness of breath, no wheezing, no cough  Gastrointestinal: No complaints of abdominal pain, no constipation, no nausea or vomiting, no diarrhea or bloody stools     Genitourinary: No complaints of dysuria or incontinence, no hesitancy, no nocturia  Musculoskeletal: as noted in HPI  Integumentary: No complaints of skin rash or lesion, no itching or dry skin, no skin wounds  Neurological: No complaints of headache, no confusion, no numbness or tingling, no dizziness  Psychiatric: No suicidal thoughts, no anxiety, no depression  Endocrine: No muscle weakness, no frequent urination, no excessive thirst, no feelings of weakness  ROS reviewed  Active Problems    1  Adrenal adenoma (227 0) (D35 00)   2  Lumbago (724 2) (M54 5)   3  Thoracic compression fracture (805 2) (S22 000A)   4  Umbilical hernia (724 5) (K42 9)   5  Vitamin D deficiency (268 9) (E55 9)    Past Medical History    · History of Status post hernia repair (V45 89) (Z98 89,Z87 19)    The active problems and past medical history were reviewed and updated today  Surgical History    · History of Hernia Repair   · History of Neuroplasty Decompression Median Nerve At Carpal Tunnel    The surgical history was reviewed and updated today  Family History    · Family history of malignant neoplasm (V16 9) (Z80 9)    · Family history of malignant neoplasm (V16 9) (Z80 9)    · Family history of malignant neoplasm (V16 9) (Z80 9)    · Family history of Arthritis (716 90) (M19 90)   · Family history of Cancer (199 1) (C80 1)    The family history was reviewed and updated today  Social History    · Caffeine use (V49 89) (F15 90)   · Never a smoker   · Social drinker  The social history was reviewed and updated today  Current Meds   1  CeleXA 40 MG Oral Tablet; Therapy: 78FYD3167 to Recorded   2  MethylPREDNISolone (Isrrael) 4 MG Oral Tablet; TAKE AS DIRECTED ON PATIENT   INSTRUCTION CARD; Therapy: 69ZRY9823 to (Last 7878 8621)  Requested for: 27Nov2015 Ordered   3  Naproxen 500 MG Oral Tablet; TAKE 1 TABLET EVERY 12 HOURS WITH FOOD AS   NEEDED;    Therapy: 87SJM8425 to (Evaluate:91Tjc2759)  Requested for: 33JRQ2400; Last   Rx:51Ufk7359 Ordered   4  RisperiDONE 0 5 MG Oral Tablet Recorded   5  Wellbutrin  MG Oral Tablet Extended Release 12 Hour; Take 1 tablet daily   Recorded   6  Zolpidem Tartrate 10 MG Oral Tablet; Therapy: 21WCA3134 to Recorded    The medication list was reviewed and updated today  Allergies    1  Penicillins    Vitals   Recorded: 71EJG4923 01:24PM   Heart Rate 99   Systolic 156   Diastolic 88   Height 5 ft 10 in   Weight 280 lb 6 08 oz   BMI Calculated 40 23   BSA Calculated 2 41     Physical Exam   Back: Tender to palpation 3 inches lateral to T10-T12 in a 2 inch diameter  Patient has no complaints of pain with bending in all directions followed 5 strength hip flexion, hip extension, knee flexion, knee extension, ankle dorsi plantar flexion  Sensation intact to light touch, L1-S1 normal   Deep tendon reflexes bilaterally      Attending Note  Collaborating Physician Note: Collaborating Physician: I interviewed and examined the patient, I discussed the case with the Advanced Practitioner and reviewed the note, I supervised the Advanced Practitioner and I agree with the Advanced Practitioner note  Attending Note [facility]: Level of Participation: I was present in clinic and examined the patient  Patient's History: Patient presents for followup regarding Right-sided thoracolumbar muscle pain  Previous MRI of the thoracic spine demonstrates no stenosis  He states that therapy has helped a little, but he still reports persistent symptoms  He denies any radiation to his arms or legs  Key Parts of the Exam: Mild tenderness to palpation in the muscle over the thoracolumbar junction  On the right  Neurologically, Stable L2-S1 bilaterally  Negative straight leg raise bilaterally  Therapy  Rehabilitation Services Referral:   Patient Status: routine  Diagnosis: Left thoracolumbar myofascial pain     Rehabilitation Services: evaluate and treat patient as needed and initiate a home exercise program    Frequency: 3 times per week, for 8 weeks  Please send progress report  Signatures   Electronically signed by :  La Bravo, ; Jan 29 2016  2:08PM EST                       (Author)    Electronically signed by : HAMIDA Charlton ; Jan 29 2016  2:46PM EST                       (Author)

## 2018-01-11 NOTE — PROGRESS NOTES
Message  Patient called and left message  He wants to cancel appointment for 5/24/17 with SW and RD because of the following; he has not done any lesson plans or can he exercises  He has an appointment on 5/30/17 for injections on back and hopes that will help with his walking  He would like to reschedule in a few weeks  Outreach phone call; no response but left message  I received voice mail and would like to reschedule with patient  NV      Active Problems    1  Adrenal adenoma (227 0) (D35 00)   2  Bilateral carpal tunnel syndrome (354 0) (G56 03)   3  Carpal tunnel syndrome of right wrist (354 0) (G56 01)   4  Chronic right-sided thoracic back pain (724 1,338 29) (M54 6,G89 29)   5  CMC arthritis, thumb, degenerative (715 34) (M18 9)   6  Cubital tunnel syndrome (354 2) (G56 20)   7  Cubital tunnel syndrome of both upper extremities (354 2) (G56 23)   8  Elbow pain (719 42) (M25 529)   9  ALYSHA (generalized anxiety disorder) (300 02) (F41 1)   10  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)   11  Long term current use of therapeutic drug (V58 69) (Z79 899)   12  Lumbago (724 2) (M54 5)   13  Major depressive disorder, recurrent, severe without psychotic behavior (296 33) (F33 2)   14  Median nerve neuritis, unspecified laterality (354 1) (G56 10)   15  Morbid obesity (278 01) (E66 01)   16  Myofascial pain syndrome (729 1) (M79 1)   17  Other insomnia (780 52) (G47 09)   18  Pain in both hands (729 5) (M79 641,M79 642)   19  Postoperative examination (V67 00) (Z09)   20  Postoperative wound dehiscence, initial encounter (998 32) (T81 31XA)   21  Postoperative wound dehiscence, subsequent encounter (V58 89,998 32) (T81 31XD)   22  Pre-operative clearance (V72 84) (Z01 818)   23  Sleep apnea (780 57) (G47 30)   24  Thoracic compression fracture (805 2) (S22 000A)   25  Tinea capitis (110 0) (B35 0)   26  Ulnar 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 of left upper extremity (354 2) (G56 22)   27  Umbilical hernia (727 1) (K42 9)   28  Vitamin D deficiency (268 9) (E55 9)    Current Meds   1  ARIPiprazole 2 MG Oral Tablet; TAKE 1 TABLET AT BEDTIME; Therapy: 11Apr2017 to (Evaluate:09Aug2017)  Requested for: 11Apr2017; Last   Rx:11Apr2017 Ordered   2  BuPROPion HCl ER (XL) 150 MG Oral Tablet Extended Release 24 Hour; take 3 tablets   daily; Therapy: 87IVE0685 to (Evaluate:09Aug2017)  Requested for: 11Apr2017; Last   Rx:11Apr2017 Ordered   3  Escitalopram Oxalate 20 MG Oral Tablet; TAKE 1 TABLET DAILY; Therapy: 85VOL1051 to (Evaluate:09Aug2017)  Requested for: 11Apr2017; Last   Rx:11Apr2017 Ordered   4  Vitamin B6 200 MG Oral Tablet; TAKE 1 TABLET DAILY AS DIRECTED; Therapy: 57KHU3817 to (Evaluate:07Aug2017)  Requested for: 27UXK9430; Last   YB:94CSQ2348 Ordered   5  Vitamin D (Ergocalciferol) 05042 UNIT Oral Capsule; TAKE 1 CAPSULE WEEKLY; Therapy: 40XRX1769 to (Evaluate:28Jun2017)  Requested for: 87LMQ4974; Last   Rx:30Ngo9860 Ordered   6  Zolpidem Tartrate 10 MG Oral Tablet; TAKE 1 TABLET AT BEDTIME AS NEEDED; Therapy: 86UJD1483 to (Evaluate:09Aug2017); Last Rx:11Apr2017 Ordered    Allergies    1   Penicillins    Signatures   Electronically signed by : LAZARO Zhong; May 24 2017 11:45AM EST                       (Author)

## 2018-01-11 NOTE — RESULT NOTES
suggestive of folliculitis decalvans which is   often associated with Staphylococcal aureus but its role in the disease   process is uncertain  The differential diagnosis includes dissecting   cellulitis and infectious folliculitis  No sinus tracts typical of   dissecting cellulitis are appreciated  Compound hair follicles are not   typical of infectious folliculitis, but culture should be considered, if   additional plaques of alopecia are present  LAB AP SURGICAL ADDITIONAL INFORMATION (Report)     These tests were developed and their performance characteristics   determined by [facility] or [facility]  They may not be cleared or approved by the [place]  Food and   Drug Administration  The FDA has determined that such clearance or   approval is not necessary  These tests are used for clinical purposes  They should not be regarded as investigational or for research  This   laboratory has been approved by [place] [address], designated as a high-complexity   laboratory and is qualified to perform these tests  LAB AP GROSS DESCRIPTION (Report)     A  The specimen is received in formalin, labeled with the patient's name   and hospital number, and is designated scalp lesion  The specimen   consists of an unorientated rubbery gray-tan ellipse of skin with attached   underlying fibrofatty tissue together measuring 4 5 x 2 2 x 1 0 cm in   greatest dimension  The skin surface exhibits a raised, firm pale-tan   lesion measuring 2 2 x 1 6 x 0 3 cm in greatest dimension  The lesion   grossly extends to within 0 3 cm of the nearest peripheral skin resection   surface  A vague pale tan-brown depressed focus located 0 2 cm from the   lesion measures 0 3 x 0 3 x 0 15 cm and is located less than 0 1 cm and   the peripheral resected margin  Resected surfaces are inked blue and   opposite skin surface tips red   Entirely submitted as follows:  1: One tip   2-12: Remaining midportion sequentially submitted from one tip to opposite   tip with depressed focus in cassette 2 and lesion submitted in cassettes   4-11   13: Opposite tip  Note: The estimated total formalin fixation time based upon information   provided by the submitting clinician and the standard processing schedule   is 36 0 hours  SRS   LAB AP CLINICAL INFORMATION      Posterior scalp is a 2 x 2 and a half centimeter hairless area that feels like scar

## 2018-01-12 VITALS
HEART RATE: 76 BPM | SYSTOLIC BLOOD PRESSURE: 134 MMHG | TEMPERATURE: 97.3 F | WEIGHT: 297.44 LBS | DIASTOLIC BLOOD PRESSURE: 90 MMHG | BODY MASS INDEX: 42.58 KG/M2 | RESPIRATION RATE: 18 BRPM | HEIGHT: 70 IN

## 2018-01-12 VITALS
HEIGHT: 69 IN | WEIGHT: 282.5 LBS | BODY MASS INDEX: 41.84 KG/M2 | DIASTOLIC BLOOD PRESSURE: 82 MMHG | SYSTOLIC BLOOD PRESSURE: 130 MMHG | TEMPERATURE: 98.2 F | HEART RATE: 80 BPM | RESPIRATION RATE: 18 BRPM

## 2018-01-12 VITALS
WEIGHT: 290 LBS | DIASTOLIC BLOOD PRESSURE: 82 MMHG | HEIGHT: 65 IN | BODY MASS INDEX: 48.32 KG/M2 | SYSTOLIC BLOOD PRESSURE: 131 MMHG | HEART RATE: 91 BPM

## 2018-01-12 NOTE — PSYCH
Psych Med Mgmt    Appearance: adequate hygiene and grooming, demonstrated behavior psychomotor retardation and good eye contact  Observed mood: depressed and anxious  Observed mood: affect was blunted  Speech: speech soft and fluent  Thought processes: coherent/organized  Hallucinations: no hallucinations present  Thought Content: no delusions  Abnormal Thoughts: The patient has no suicidal thoughts and no homicidal thoughts  Orientation: The patient is oriented to person, place and time  Recent and Remote Memory: short term memory intact and long term memory intact  Attention Span And Concentration: concentration impaired  Insight: Insight intact  Judgment: His judgment was intact  Muscle Strength And Tone  Muscle strength and tone were normal  Normal gait and station  Language: no difficulty naming common objects, no difficulty repeating a phrase and no difficulty writing a sentence  Fund of knowledge: Patient displays adequate knowledge of current events, adequate fund of knowledge regarding past history and adequate fund of knowledge regarding vocabulary  The patient is experiencing moderate to severe pain  On a scale of 0 - 10 the pain severity is a 4  Treatment Recommendations: Continue Wellbutrin  mg daily  Continue Lexapro 20 mg daily  Add Abilify 2 mg at bedtime to help with depression and nightmares  Discontinue Trazodone - not helping  Restart Ambien 10 mg at bedtime as needed  Does not want any other medication changes  Therapy with own therapist  Has not done labs yet; will also add lab slip for lipid profile    Risks, Benefits And Possible Side Effects Of Medications: Risks, benefits, and possible side effects of medications explained to patient and patient verbalizes understanding  He reports normal appetite, decreased energy, recent 5 lbs weight loss and decrease in number of sleep hours 7       July aMrie states he continues to experience on and off depressive symptoms and anxiety since last visit  Still at times isolates self, often is unable to enjoy things and activities with his son  Overall he thinks that depression is slightly better on higher Wellbutrin XL dose  Denies suicidal or homicidal ideation  No psychotic symptoms  Sleep is still decreased, continue to have nightmares despite being off Ambien  Appetite is good  Has been trying to lose weight and is involved with bariatric program   Reports paresthesias - thinks it is related to Trazodone  No other side effects from medications reported  PHQ-9 is improved today at 12 (from 22 at the last visit)  Vitals  Signs   Recorded: 11Apr2017 02:33PM   Heart Rate: 92  Systolic: 197  Diastolic: 81  BP Cuff Size: Large  Height: 5 ft 0 5 in  Weight: 296 lb   BMI Calculated: 56 86  BSA Calculated: 2 22    Assessment    1  ALYSHA (generalized anxiety disorder) (300 02) (F41 1)   2  Other insomnia (780 52) (G47 09)   3  Major depressive disorder, recurrent, severe without psychotic behavior (296 33) (F33 2)    Plan    1  Escitalopram Oxalate 20 MG Oral Tablet; TAKE 1 TABLET DAILY    2  Follow-up visit in 6 weeks Evaluation and Treatment  Follow-up  Status: Hold For -   Scheduling  Requested for: 11Apr2017    3  Melatonin 3 MG Oral Tablet   4  TraZODone HCl - 100 MG Oral Tablet    5  (1) LIPID PANEL, FASTING; Status:Active; Requested for:11Apr2017;     6  ARIPiprazole 2 MG Oral Tablet; TAKE 1 TABLET AT BEDTIME   7  BuPROPion HCl ER (XL) 150 MG Oral Tablet Extended Release 24 Hour; take 3   tablets daily    8  Zolpidem Tartrate 10 MG Oral Tablet; TAKE 1 TABLET AT BEDTIME AS NEEDED    Review of Systems    Constitutional: feeling tired and recent 5 lb weight loss  Cardiovascular: no complaints of slow or fast heart rate, no chest pain, no palpitations  Respiratory: no complaints of shortness of breath, no wheezing, no dyspnea on exertion     Gastrointestinal: no complaints of abdominal pain, no constipation, no nausea, no diarrhea, no vomiting  Genitourinary: no complaints of dysuria, no incontinence, no pelvic pain, no urinary frequency  Musculoskeletal: leg pain and lower back pain  Integumentary: no complaints of skin rash, no itching, no dry skin  Neurological: no complaints of headache, no confusion, no numbness, no dizziness  Past Psychiatric History    Past Psychiatric History: History of depression since 2006  No history of inpatient psychiatric admissions  Was in treatment with Dr Kimberly Munoz for over 1 year and with PCP for several years prior to that  Has a therapist Yoan Spears  No history of past suicide attempts  Past medication trials with Wellbutrin, Lexapro, Ambien  Substance Abuse Hx    Substance Abuse History: Social alcohol use 1 drink per month  No recreational drug use  No tobacco           Active Problems    1  Adrenal adenoma (227 0) (D35 00)   2  Bilateral carpal tunnel syndrome (354 0) (G56 03)   3  Carpal tunnel syndrome of right wrist (354 0) (G56 01)   4  Chronic right-sided thoracic back pain (724 1,338 29) (M54 6,G89 29)   5  CMC arthritis, thumb, degenerative (715 34) (M18 9)   6  Cubital tunnel syndrome (354 2) (G56 20)   7  Cubital tunnel syndrome of both upper extremities (354 2) (G56 23)   8  Elbow pain (719 42) (M25 529)   9  ALYSHA (generalized anxiety disorder) (300 02) (F41 1)   10  Long term current use of therapeutic drug (V58 69) (Z79 899)   11  Lumbago (724 2) (M54 5)   12  Major depressive disorder, recurrent, severe without psychotic behavior (296 33) (F33 2)   13  Morbid obesity (278 01) (E66 01)   14  Myofascial pain syndrome (729 1) (M79 1)   15  Pain in both hands (729 5) (M79 641,M79 642)   16  Postoperative examination (V67 00) (Z09)   17  Postoperative wound dehiscence, initial encounter (998 32) (T81 31XA)   18  Postoperative wound dehiscence, subsequent encounter (V58 89,998 32) (T81 31XD)   19  Pre-operative clearance (V72 84) (Z01 818)   20  Sleep apnea (780 57) (G47 30)   21  Thoracic compression fracture (805 2) (S22 000A)   22  Tinea capitis (110 0) (B35 0)   23  Ulnar 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 of left upper extremity (354 2) (G56 22)   24  Umbilical hernia (342 5) (K42 9)   25  Vitamin D deficiency (268 9) (E55 9)    Past Medical History    1  History of Anxiety (300 00) (F41 9)   2  History of depression (V11 8) (Z86 59)   3  History of folliculitis (T49 9) (V59 2)   4  Denied: History of head injury   5  History of Knee pain (719 46) (M25 569)   6  History of Liver lesion (573 8) (K76 9)   7  History of Neck pain (723 1) (M54 2)   8  History of Nodular adrenal cortex (255 8) (E27 8)   9  History of Pain, wrist joint (719 43) (M25 539)   10  Denied: History of Seizures   11  History of Skin lesion of scalp (709 9) (L98 9)   12  History of Status post hernia repair (V45 89) (Z98 890,Z87 19)    The active problems and past medical history were reviewed and updated today  Allergies    1  Penicillins    Current Meds   1  BuPROPion HCl ER (XL) 150 MG Oral Tablet Extended Release 24 Hour; take 3 tablets   daily; Therapy: 52HJB0105 to (Trent Ku)  Requested for: 99DHY6852; Last   Rx:19Emg5226 Ordered   2  Escitalopram Oxalate 20 MG Oral Tablet; TAKE 1 TABLET DAILY; Therapy: 44YAS9226 to (Trent Ku)  Requested for: 77TOQ2319; Last   Rx:71Cfv2052 Ordered   3  Melatonin 3 MG Oral Tablet; TAKE 1 OR 2 TABLETS AT BEDTIME AS NEEDED; Therapy: 75ZYI1742 to (Evaluate:29Apr2017); Last Rx:30Mar2017 Ordered   4  TiZANidine HCl - 4 MG Oral Tablet; TAKE 1 TABLET Twice daily PRN SPASM; Therapy: 03WEL6630 to (Kristian Sher)  Requested for: 69NJI3720; Last   Rx:08Mar2017 Ordered   5  TraZODone HCl - 100 MG Oral Tablet; TAKE 1 OR 2 TABLETS AT BEDTIME AS NEEDED   FOR SLEEP; Therapy: 57HYT0909 to (Evaluate:05Nbb7157)  Requested for: 62JOG5766; Last   Rx:07Mar2017 Ordered   6   Vitamin D (Ergocalciferol) 75090 UNIT Oral Capsule; TAKE 1 CAPSULE WEEKLY; Therapy: 03ZCI9203 to (Evaluate:28Jun2017)  Requested for: 37BLZ2857; Last   Rx:35Leo8457 Ordered   7  Zolpidem Tartrate 10 MG Oral Tablet; TAKE 1 TABLET AT BEDTIME AS NEEDED; Therapy: 64EMM2507 to (Evaluate:22Mar2017); Last Rx:07Mar2017 Ordered    The medication list was reviewed and updated today  Family Psych History  Mother    1  Family history of malignant neoplasm (V16 9) (Z80 9)   2  Denied: FH: mental illness  Father    3  Family history of arthritis (V17 7) (Z82 61)   4  Family history of osteoporosis (V17 81) (Z82 62)   5  Denied: FH: mental illness  Sister    6  Family history of depression (V17 0) (Z81 8)   7  Family history of suicide attempt (V17 0) (Z81 8)  Maternal Grandmother    8  Family history of malignant neoplasm (V16 9) (Z80 9)  Maternal Grandfather    9  Family history of malignant neoplasm (V16 9) (Z80 9)  Family History    10  Family history of Arthritis (716 90) (M19 90)   11  Family history of Back problem   12  Family history of Cancer (199 1) (C80 1)    The family history was reviewed and updated today  Social History    · Caffeine use (V49 89) (F15 90)   · Never a smoker   · No illicit drug use   · Social drinker  The social history was reviewed and updated today  The social history was reviewed and is unchanged   for 15 years  Lives with parents  Has one adult son  Currently unemployed, 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  Some college  No history of legal problems  No  history  History Of Phys/Sex Abuse Or Perpetration    History Of Phys/Sex Abuse or Perpetration: No history of physical or sexual abuse  End of Encounter Meds    1  Escitalopram Oxalate 20 MG Oral Tablet; TAKE 1 TABLET DAILY; Therapy: 13WNS2760 to (Evaluate:09Aug2017)  Requested for: 11Apr2017; Last   Rx:11Apr2017 Ordered    2  ARIPiprazole 2 MG Oral Tablet; TAKE 1 TABLET AT BEDTIME; Therapy: 11Apr2017 to (Evaluate:09Aug2017);  Last Rx:11Apr2017 Ordered   3  BuPROPion HCl ER (XL) 150 MG Oral Tablet Extended Release 24 Hour; take 3 tablets   daily; Therapy: 29MSF7662 to (Evaluate:09Aug2017)  Requested for: 11Apr2017; Last   Rx:11Apr2017 Ordered    4  TiZANidine HCl - 4 MG Oral Tablet; TAKE 1 TABLET Twice daily PRN SPASM; Therapy: 37WPH6308 to (Sherrie Hewitt)  Requested for: 19ZDE5122; Last   Rx:08Mar2017 Ordered    5  Zolpidem Tartrate 10 MG Oral Tablet; TAKE 1 TABLET AT BEDTIME AS NEEDED; Therapy: 75WCY6144 to (Evaluate:09Aug2017); Last Rx:11Apr2017 Ordered    6  Vitamin D (Ergocalciferol) 83962 UNIT Oral Capsule; TAKE 1 CAPSULE WEEKLY; Therapy: 60XNT2449 to (Evaluate:28Jun2017)  Requested for: 04Dnj0964; Last   Rx:89Vhx0190 Ordered    Future Appointments    Date/Time Provider Specialty Site   01/03/2018 07:45 AM James Kendrick, [address] Endocrinology [facility]   05/09/2017 02:30 PM HAMIDA Smith   [address]     Signatures   Electronically signed by : HAMIDA Thornton ; Apr 11 2017  2:52PM EST                       (Author)

## 2018-01-12 NOTE — MISCELLANEOUS
Message   Recorded as Task   Date: 10/11/2017 09:34 AM, Created By: Iam Avelar   Task Name: Call Back   Assigned To: [facility] jeannie clinical,Team   Regarding Patient: Dk Aguila, Status: Active   CommentCherrie Boas - 11 Oct 2017 9:34 AM     TASK CREATED  [facility] Call Center- patient called requesting to leave a message for a nurse/ Q06   stated he is making progress w/ his pain 25% relief since taking Gabapentin   would like to know if his dosage can be increased   would like a c/b 49[address] Justo Jensen - 11 Oct 2017 10:46 AM     TASK IN PROGRESS   Sharonda Garay - 11 Oct 2017 10:50 AM     TASK EDITED  S/W pt who reported the same  Pt said he is currently taking gabapentin 100mg, 3 pills at HS and is having 25% relief  Pt said he's having no s/e at all  Pt asking if can be increased? Pt said to tell Humberto De La Torre "he owes her a big hug b/c she found something for him that works!"    Told pt I would forward message to Humberto De La Torre and we will c/b with her rec  Arline Le - 11 Oct 2017 1:45 PM     TASK REPLIED TO: Previously Assigned To Arline Le  He can increase gabapentin to 300 mg twice a day  I can send him a new prescription to the pharmacy for gabapentin 300 mg BID  Patient currently has 100 mg capsules at home  Please advise him that he could possibly experience increased side effects of dizziness or drowsiness with increasing the dose  Would he like for me to send the prescription to the pharmacy on file? Pam Smith - 11 Oct 2017 2:25 PM     TASK EDITED  [place] provided cb#   Obinna Gandhi - 18 Oct 2017 3:18 PM     TASK EDITED  lmom to cb   Sharonda Garay - 19 Oct 2017 4:17 PM     TASK EDITED  *Pls call pt 10/20/17, see below  Ana Joyce - 20 Oct 2017 8:46 AM     TASK EDITED  Attempted to reach pt  LVMMOM with c/b #, location and office  hours  Sharonda Garay - 24 Oct 2017 9:51 AM     TASK EDITED  Finally s/w pt and advised of HA rec to inc the gabapentin to 300mg BID  Pt said he just had his 100mg bottle filled yest, he will start the inc dosing and use of the 100mg pills first but would like the 300mg BID RX sent to the [facility]  Pt understood to contact the office if he has any adverse s/e from the inc dosing and will could adjust the dosing  Pls send a gabapentin 300mg BID RX to  [facility]  Arline Le - 24 Oct 2017 4:31 PM     TASK REPLIED TO: Previously Assigned To Arline Le  Please advise the patient that I sent gabapentin 300 mg PO 1 capsule 2 times daily, 30 day supply #60 capsules with 1 refill to Sharonda Jackman - 25 Oct 2017 7:59 AM     TASK EDITED  Left vm for pt on his cell (ok per release) that his gabapentin RF was sent to his pharmacy late yest afternoon  Active Problems    1  Adrenal adenoma (227 0) (D35 00)   2  Bilateral carpal tunnel syndrome (354 0) (G56 03)   3  Carpal tunnel syndrome of right wrist (354 0) (G56 01)   4  Chronic right-sided thoracic back pain (724 1,338 29) (M54 6,G89 29)   5  CMC arthritis, thumb, degenerative (715 34) (M18 9)   6  Cubital tunnel syndrome (354 2) (G56 20)   7  Cubital tunnel syndrome of both upper extremities (354 2) (G56 23)   8  Elbow pain (719 42) (M25 529)   9  ALYSHA (generalized anxiety disorder) (300 02) (F41 1)   10  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)   11  Long term current use of therapeutic drug (V58 69) (Z79 899)   12  Lumbago (724 2) (M54 5)   13  Major depressive disorder, recurrent, severe without psychotic behavior (296 33) (F33 2)   14  Median nerve neuritis, unspecified laterality (354 1) (G56 10)   15  Morbid obesity (278 01) (E66 01)   16  Myofascial pain syndrome (729 1) (M79 1)   17  Other insomnia (780 52) (G47 09)   18  Pain in both hands (729 5) (M79 641,M79 642)   19  Postoperative examination (V67 00) (Z09)   20  Postoperative wound dehiscence, initial encounter (998 32) (T81 31XA)   21   Postoperative wound dehiscence, subsequent encounter (V58 89,998 32) (T81 31XD)   22  Pre-operative clearance (V72 84) (Z01 818)   23  Sleep apnea (780 57) (G47 30)   24  Thoracic compression fracture (805 2) (S22 000A)   25  Tinea capitis (110 0) (B35 0)   26  Ulnar 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 of left upper extremity (354 2) (G56 22)   27  Umbilical hernia (198 0) (K42 9)   28  Vitamin D deficiency (268 9) (E55 9)    Current Meds   1  ARIPiprazole 2 MG Oral Tablet; TAKE 1 TABLET AT BEDTIME; Therapy: 45Ukh1093 to (Evaluate:77Cnt0854)  Requested for: 71Wbc5442; Last   Rx:76Pvd8830 Ordered   2  BuPROPion HCl ER (XL) 150 MG Oral Tablet Extended Release 24 Hour; take 3 tablets   daily; Therapy: 96FHZ9933 to (0474 77 19 07)  Requested for: 05Bde2557; Last   Rx:63Rej8324 Ordered   3  Escitalopram Oxalate 20 MG Oral Tablet; TAKE 1 TABLET DAILY; Therapy: 08HSO3117 to (0474 77 19 07)  Requested for: 09Qzv1905; Last   Rx:08Zmo0130 Ordered   4  Gabapentin 300 MG Oral Capsule; TAKE 1 CAPSULE TWICE DAILY; Therapy: 09Tai7515 to (Evaluate:96Ozj2959)  Requested for: 24Oct2017; Last   Rx:24Oct2017 Ordered   5  Vitamin D (Ergocalciferol) 16466 UNIT Oral Capsule; Take one capsule by mouth   weekly; Therapy: 63XDY7128 to (Evaluate:30Jan2018)  Requested for: 01Isb3528; Last   Rx:99Zgq4373 Ordered   6  Zolpidem Tartrate 10 MG Oral Tablet; TAKE 1 TABLET AT BEDTIME AS NEEDED; Therapy: 35GWL3572 to (Evaluate:51Jps1046); Last Rx:79Lfs5412 Ordered    Allergies    1   Penicillins    Signatures   Electronically signed by : Nancy Montes, ; Oct 25 2017  7:59AM EST                       (Author)

## 2018-01-12 NOTE — MISCELLANEOUS
Message   Recorded as Task   Date: 03/07/2017 03:29 PM, Created By: Lelia Vences   Task Name: Medical Complaint Callback   Assigned To: Priscila Walsh   Regarding Patient: Александр Collins, Status: Active   Comment:    Lelia Vences - 07 Mar 2017 3:29 PM     TASK CREATED  Caller: Self; Medical Complaint; [phone] (Home)  pt states trazadone is not working for sleep   pt unable to sleep  and he is going on a trip to visit son in college in two days and he is very concerned with no sleep   Spoke with patient - has not been sleeping  Concerned about upcoming travel and inability to sleep  Asking to a temporary Ambien prescription while he is away (Ambien helped in the past, but patient had increased nightmares on Ambien); wants to try higher Trazodone dose once he returns (he plans stop Ambien at that point)  Plan: 15 day prescription for Ambien 10 mg at bedtime as needed called in  Renewed trazodone for a higher dose 100 mg 1 or 2 tablets at bedtime as needed        Plan  Insomnia    · Zolpidem Tartrate 10 MG Oral Tablet; TAKE 1 TABLET AT BEDTIME AS NEEDED   · TraZODone HCl - 100 MG Oral Tablet; TAKE 1 OR 2 TABLETS AT BEDTIME AS  NEEDED FOR SLEEP    Signatures   Electronically signed by : HAMIDA Browne ; Mar  7 2017  4:07PM EST                       (Author)

## 2018-01-13 VITALS
RESPIRATION RATE: 16 BRPM | DIASTOLIC BLOOD PRESSURE: 78 MMHG | WEIGHT: 295 LBS | HEART RATE: 80 BPM | HEIGHT: 65 IN | SYSTOLIC BLOOD PRESSURE: 122 MMHG | BODY MASS INDEX: 49.15 KG/M2

## 2018-01-13 VITALS — HEIGHT: 65 IN | BODY MASS INDEX: 47.4 KG/M2 | WEIGHT: 284.5 LBS

## 2018-01-13 VITALS — WEIGHT: 284.31 LBS | BODY MASS INDEX: 47.37 KG/M2 | HEIGHT: 65 IN

## 2018-01-13 VITALS — BODY MASS INDEX: 56.94 KG/M2 | HEIGHT: 61 IN | WEIGHT: 301.6 LBS

## 2018-01-13 VITALS — BODY MASS INDEX: 49.16 KG/M2 | WEIGHT: 295.1 LBS | HEIGHT: 65 IN

## 2018-01-13 NOTE — PSYCH
Psych Med Mgmt    Appearance: was calm and cooperative, adequate hygiene and grooming and good eye contact  Observed mood: depressed and anxious  Observed mood: affect was constricted  Speech: speech soft and fluent  Thought processes: coherent/organized  Hallucinations: no hallucinations present  Thought Content: no delusions  Abnormal Thoughts: The patient has no suicidal thoughts and no homicidal thoughts  Orientation: The patient is oriented to person, place and time  Recent and Remote Memory: short term memory intact and long term memory intact  Attention Span And Concentration: concentration impaired  Insight: Insight intact  Judgment: His judgment was intact  Muscle Strength And Tone  Muscle strength and tone were normal  Normal gait and station  Language: no difficulty naming common objects, no difficulty repeating a phrase and no difficulty writing a sentence  Fund of knowledge: Patient displays adequate knowledge of current events, adequate fund of knowledge regarding past history and adequate fund of knowledge regarding vocabulary  The patient is experiencing moderate to severe pain  On a scale of 0 - 10 the pain severity is a 4  Treatment Recommendations: Continue Wellbutrin  mg daily  Continue Lexapro 20 mg daily  Continue Ambien 10 mg at bedtime as needed  Trial of Abilify 2 mg at bedtime - has not started Abilify yet  Follows with PCP for glucose and lipid monitoring  Therapy with own therapist      Risks, Benefits And Possible Side Effects Of Medications: Risks, benefits, and possible side effects of medications explained to patient and patient verbalizes understanding  No records found for controlled prescriptions according to Yasmany Boateng 17        He reports normal appetite, decreased energy, recent 1 lbs weight gain  and increase in number of sleep hours 13       Gaylon Simmonds states he still has been feeling depressed on and off  Concerned about legal issues, also worrying about endoscopy results that he had last week  Still has anxiety symptoms  Denies suicidal or homicidal ideation  No psychotic symptoms  Sleep is increased sometimes  Appetite is good  Involved with bariatric program   No side effects from medications reported  PHQ-9 remains at 12 today(same as at the last visit)  Vitals  Signs   Recorded: 30Aug2017 02:39PM   Heart Rate: 79  Systolic: 002  Diastolic: 86  BP Cuff Size: Large  Height: 5 ft 8 5 in  Weight: 283 lb   BMI Calculated: 42 4  BSA Calculated: 2 38    Assessment    1  Major depressive disorder, recurrent, severe without psychotic behavior (296 33) (F33 2)   2  ALYSHA (generalized anxiety disorder) (300 02) (F41 1)   3  Other insomnia (780 52) (G47 09)    Plan    1  Escitalopram Oxalate 20 MG Oral Tablet; TAKE 1 TABLET DAILY    2  Follow-up visit in 2 months Evaluation and Treatment  Follow-up  Status: Hold For -   Scheduling  Requested for: 30Aug2017    3  ARIPiprazole 2 MG Oral Tablet; TAKE 1 TABLET AT BEDTIME   4  BuPROPion HCl ER (XL) 150 MG Oral Tablet Extended Release 24 Hour; take 3   tablets daily    5  Zolpidem Tartrate 10 MG Oral Tablet; TAKE 1 TABLET AT BEDTIME AS NEEDED    Review of Systems    Constitutional: recent 1 lb weight gain  Cardiovascular: no complaints of slow or fast heart rate, no chest pain, no palpitations  Respiratory: no complaints of shortness of breath, no wheezing, no dyspnea on exertion  Gastrointestinal: no complaints of abdominal pain, no constipation, no nausea, no diarrhea, no vomiting  Genitourinary: no complaints of dysuria, no incontinence, no pelvic pain, no urinary frequency  Musculoskeletal: ankle pain and mid back pain  Integumentary: no complaints of skin rash, no itching, no dry skin  Neurological: no complaints of headache, no confusion, no numbness, no dizziness        Past Psychiatric History    Past Psychiatric History: History of depression since 2006  No history of inpatient psychiatric admissions  Was in treatment with Dr Kimberly Munoz for over 1 year and with PCP for several years prior to that  Has a therapist   No history of past suicide attempts  Past medication trials with Wellbutrin, Lexapro, Ambien  Substance Abuse Hx    Substance Abuse History: Social alcohol use 1 drink per month  No recreational drug use  No tobacco           Active Problems    1  Adrenal adenoma (227 0) (D35 00)   2  Bilateral carpal tunnel syndrome (354 0) (G56 03)   3  Carpal tunnel syndrome of right wrist (354 0) (G56 01)   4  Chronic right-sided thoracic back pain (724 1,338 29) (M54 6,G89 29)   5  CMC arthritis, thumb, degenerative (715 34) (M18 9)   6  Cubital tunnel syndrome (354 2) (G56 20)   7  Cubital tunnel syndrome of both upper extremities (354 2) (G56 23)   8  Elbow pain (719 42) (M25 529)   9  ALYSHA (generalized anxiety disorder) (300 02) (F41 1)   10  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)   11  Long term current use of therapeutic drug (V58 69) (Z79 899)   12  Lumbago (724 2) (M54 5)   13  Major depressive disorder, recurrent, severe without psychotic behavior (296 33) (F33 2)   14  Median nerve neuritis, unspecified laterality (354 1) (G56 10)   15  Morbid obesity (278 01) (E66 01)   16  Myofascial pain syndrome (729 1) (M79 1)   17  Other insomnia (780 52) (G47 09)   18  Pain in both hands (729 5) (M79 641,M79 642)   19  Postoperative examination (V67 00) (Z09)   20  Postoperative wound dehiscence, initial encounter (998 32) (T81 31XA)   21  Postoperative wound dehiscence, subsequent encounter (V58 89,998 32) (T81 31XD)   22  Pre-operative clearance (V72 84) (Z01 818)   23  Sleep apnea (780 57) (G47 30)   24  Thoracic compression fracture (805 2) (S22 000A)   25  Tinea capitis (110 0) (B35 0)   26  Ulnar 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 of left upper extremity (354 2) (G56 22)   27  Umbilical hernia (180 1) (K42 9)   28   Vitamin D deficiency (268 9) (E55 9)    Past Medical History    1  History of Anxiety (300 00) (F41 9)   2  History of depression (V11 8) (Z86 59)   3  History of folliculitis (D97 8) (B87 9)   4  Denied: History of head injury   5  History of Knee pain (719 46) (M25 569)   6  History of Liver lesion (573 8) (K76 9)   7  History of Neck pain (723 1) (M54 2)   8  History of Nodular adrenal cortex (255 8) (E27 8)   9  History of Pain, wrist joint (719 43) (M25 539)   10  Denied: History of Seizures   11  History of Skin lesion of scalp (709 9) (L98 9)   12  History of Status post hernia repair (V45 89) (Z98 890,Z87 19)    The active problems and past medical history were reviewed and updated today  Allergies    1  Penicillins    Current Meds   1  BuPROPion HCl ER (XL) 150 MG Oral Tablet Extended Release 24 Hour; take 3 tablets   daily; Therapy: 97GNW7308 to (Evaluate:24Nov2017)  Requested for: 78Tcu5334; Last   Rx:80Kxc8373 Ordered   2  Escitalopram Oxalate 20 MG Oral Tablet; TAKE 1 TABLET DAILY; Therapy: 50FJD3395 to (Evaluate:24Nov2017)  Requested for: 11Xbt6481; Last   Rx:42Lwi3220 Ordered   3  Vitamin D (Ergocalciferol) 75943 UNIT Oral Capsule; Take one capsule by mouth weekly; Therapy: 36OMD3656 to (Evaluate:30Jan2018)  Requested for: 42Xqi4817; Last   Rx:92Bhf4580 Ordered   4  Zolpidem Tartrate 10 MG Oral Tablet; TAKE 1 TABLET AT BEDTIME AS NEEDED; Therapy: 30ABH7620 to (Evaluate:24Nov2017); Last Rx:81Med9555 Ordered    The medication list was reviewed and updated today  Family Psych History  Mother    1  Family history of malignant neoplasm (V16 9) (Z80 9)   2  Denied: FH: mental illness  Father    3  Family history of arthritis (V17 7) (Z82 61)   4  Family history of osteoporosis (V17 81) (Z82 62)   5  Denied: FH: mental illness  Sister    6  Family history of depression (V17 0) (Z81 8)   7  Family history of suicide attempt (V17 0) (Z81 8)  Maternal Grandmother    8   Family history of malignant neoplasm (V16 9) (Z80 9)  Maternal Grandfather    9  Family history of malignant neoplasm (V16 9) (Z80 9)  Family History    10  Family history of Arthritis (716 90) (M19 90)   11  Family history of Back problem   12  Family history of Cancer (199 1) (C80 1)    The family history was reviewed and updated today  Social History    · Caffeine use (V49 89) (F15 90)   · Never a smoker   · No illicit drug use   · Social drinker  The social history was reviewed and updated today  The social history was reviewed and is unchanged  , lives with parents  Has one adult son  Currently unemployed, 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  Some college  No history of legal problems  No  history  History Of Phys/Sex Abuse Or Perpetration    History Of Phys/Sex Abuse or Perpetration: No history of physical or sexual abuse  End of Encounter Meds    1  Escitalopram Oxalate 20 MG Oral Tablet; TAKE 1 TABLET DAILY; Therapy: 44SEV9558 to (26 275728)  Requested for: 73Oiv6359; Last   Rx:39Vxn8557; Status: ACTIVE - Transmit to Pharmacy - Awaiting Verification Ordered    2  ARIPiprazole 2 MG Oral Tablet; TAKE 1 TABLET AT BEDTIME; Therapy: 11Uxz0786 to (Evaluate:26Feb2018)  Requested for: 30Tou2031; Last   Rx:82Hyo4498; Status: ACTIVE - Transmit to Pharmacy - Awaiting Verification Ordered   3  BuPROPion HCl ER (XL) 150 MG Oral Tablet Extended Release 24 Hour; take 3 tablets   daily; Therapy: 68BDV2178 to (26 767000)  Requested for: 76Kvg7915; Last   Rx:73Eom1415; Status: ACTIVE - Transmit to Pharmacy - Awaiting Verification Ordered    4  Zolpidem Tartrate 10 MG Oral Tablet; TAKE 1 TABLET AT BEDTIME AS NEEDED; Therapy: 05YES2997 to (Evaluate:83Fwc3230); Last Rx:11Dfk0193 Ordered    5  Vitamin D (Ergocalciferol) 99823 UNIT Oral Capsule; Take one capsule by mouth weekly;    Therapy: 05YNF7534 to (Evaluate:30Jan2018)  Requested for: 85Aab2338; Last   Rx:33Sfv7716 Ordered    Future Appointments    Date/Time Provider Specialty Site   09/14/2017 01:00 PM T[facility] SPINE   01/03/2018 07:45 AM Gene Gamino, [address] Endocrinology [address]     Signatures   Electronically signed by : HAMIDA Calvert ; Aug 30 2017  2:57PM EST                       (Author)

## 2018-01-14 VITALS
DIASTOLIC BLOOD PRESSURE: 90 MMHG | HEART RATE: 84 BPM | RESPIRATION RATE: 18 BRPM | SYSTOLIC BLOOD PRESSURE: 148 MMHG | BODY MASS INDEX: 42.91 KG/M2 | TEMPERATURE: 97.6 F | WEIGHT: 299.06 LBS

## 2018-01-14 VITALS
DIASTOLIC BLOOD PRESSURE: 86 MMHG | HEIGHT: 70 IN | RESPIRATION RATE: 18 BRPM | WEIGHT: 304 LBS | HEART RATE: 88 BPM | SYSTOLIC BLOOD PRESSURE: 144 MMHG | BODY MASS INDEX: 43.52 KG/M2

## 2018-01-14 VITALS
TEMPERATURE: 98 F | HEIGHT: 69 IN | HEART RATE: 76 BPM | SYSTOLIC BLOOD PRESSURE: 138 MMHG | BODY MASS INDEX: 45.1 KG/M2 | WEIGHT: 304.5 LBS | DIASTOLIC BLOOD PRESSURE: 84 MMHG | RESPIRATION RATE: 18 BRPM

## 2018-01-14 VITALS
SYSTOLIC BLOOD PRESSURE: 124 MMHG | DIASTOLIC BLOOD PRESSURE: 81 MMHG | BODY MASS INDEX: 48.32 KG/M2 | WEIGHT: 290 LBS | HEIGHT: 65 IN | HEART RATE: 84 BPM

## 2018-01-14 VITALS
DIASTOLIC BLOOD PRESSURE: 81 MMHG | BODY MASS INDEX: 55.88 KG/M2 | WEIGHT: 296 LBS | HEART RATE: 92 BPM | HEIGHT: 61 IN | SYSTOLIC BLOOD PRESSURE: 125 MMHG

## 2018-01-14 VITALS
SYSTOLIC BLOOD PRESSURE: 133 MMHG | BODY MASS INDEX: 48.32 KG/M2 | HEART RATE: 89 BPM | DIASTOLIC BLOOD PRESSURE: 73 MMHG | HEIGHT: 65 IN | WEIGHT: 290 LBS

## 2018-01-14 VITALS — WEIGHT: 304.31 LBS | BODY MASS INDEX: 45.07 KG/M2 | HEIGHT: 69 IN

## 2018-01-14 NOTE — PROGRESS NOTES
Discussion/Summary  Discussion Summary:   Assess: Pt here for monthly weigh in  Pt lost pounds  Due to insurance change in January, patient had to become established with a new psychiatrist and therapist  His psychiatrist dc/d his Leanora Appl and replaced it with trazadone  Patient has been unable to sleep since  He was up for 36 hours and then finally able to sleep for 7 hours  Asking to switch back to Leanora Appl  Psychiatrist wants him to wait until April 11th, if he has not adjusted by then his medication will be switched back to [place]  Due to his lack of sleep, he is having a difficult time making lifestyle changes  He stopped food journaling  He has decreased his portion sizes and is using a stair stepper at home and tracking his time/steps on the stepper  He is drinking a [employer] brand protein that contains 20 grams for protein and is low sugar  He decrease his coffee to 1/2 cup per day Nutrition Diagnosis: Obesity related to sedentary lifestyle and excess energy intake as evidenced by BMI Intervention: Reviewed work flow  Patient has pcp appointment next week and plans to attend support group on April 19th 2017  Reviewed importance of food journaling  Also discussed the effect of lack of sleep on hunger /satiety hormones and the importance of sleeping and getting on a routine/schedule  Encouraged completion of lesson plan number2 and 3 in bariatric booklet  Pt was receptive and verbalized understanding  Pt will work on the following goals: Activity /exercise goal: continue to use his stepper at home Attend April support group Food journal Monitoring/evaluation: Pt will lose 2 to 4 pounds by next appointment Follow up scheduled for : 3 weeks with LCSW and 4 weeks with RD  Active Problems    1  Adrenal adenoma (227 0) (D35 00)   2  Bilateral carpal tunnel syndrome (354 0) (G56 03)   3  Carpal tunnel syndrome of right wrist (354 0) (G56 01)   4   Chronic right-sided thoracic back pain (724 1,338 29) (M54 6,G89 29) 5  CMC arthritis, thumb, degenerative (715 34) (M18 9)   6  Cubital tunnel syndrome (354 2) (G56 20)   7  Cubital tunnel syndrome of both upper extremities (354 2) (G56 23)   8  Elbow pain (719 42) (M25 529)   9  ALYSHA (generalized anxiety disorder) (300 02) (F41 1)   10  Insomnia (780 52) (G47 00)   11  Long term current use of therapeutic drug (V58 69) (Z79 899)   12  Lumbago (724 2) (M54 5)   13  Major depressive disorder, recurrent, severe without psychotic behavior (296 33) (F33 2)   14  Morbid obesity (278 01) (E66 01)   15  Myofascial pain syndrome (729 1) (M79 1)   16  Pain in both hands (729 5) (M79 641,M79 642)   17  Postoperative examination (V67 00) (Z09)   18  Postoperative wound dehiscence, initial encounter (998 32) (T81 31XA)   19  Postoperative wound dehiscence, subsequent encounter (V58 89,998 32) (T81 31XD)   20  Pre-operative clearance (V72 84) (Z01 818)   21  Sleep apnea (780 57) (G47 30)   22  Thoracic compression fracture (805 2) (S22 000A)   23  Tinea capitis (110 0) (B35 0)   24  Ulnar 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 of left upper extremity (354 2) (G56 22)   25  Umbilical hernia (632 0) (K42 9)   26  Vitamin D deficiency (268 9) (E55 9)    Past Medical History    1  History of Anxiety (300 00) (F41 9)   2  History of depression (V11 8) (Z86 59)   3  History of folliculitis (M32 3) (H29 5)   4  Denied: History of head injury   5  History of Knee pain (719 46) (M25 569)   6  History of Liver lesion (573 8) (K76 9)   7  History of Neck pain (723 1) (M54 2)   8  History of Nodular adrenal cortex (255 8) (E27 8)   9  History of Pain, wrist joint (719 43) (M25 539)   10  Denied: History of Seizures   11  History of Skin lesion of scalp (709 9) (L98 9)   12  History of Status post hernia repair (V45 89) (Z98 890,Z87 19)    Surgical History    1  History of Excision Of Lesion Scalp   2  History of Eye Surgery   3  History of Hemorrhoidectomy   4  History of Hernia Repair   5  History of Knee Surgery   6   History of Neuroplasty Decompression Median Nerve At Carpal Tunnel   7  History of Umbilical Hernia Repair    Family History  Mother    1  Family history of malignant neoplasm (V16 9) (Z80 9)   2  Denied: FH: mental illness  Father    3  Family history of arthritis (V17 7) (Z82 61)   4  Family history of osteoporosis (V17 81) (Z82 62)   5  Denied: FH: mental illness  Sister    6  Family history of depression (V17 0) (Z81 8)   7  Family history of suicide attempt (V17 0) (Z81 8)  Maternal Grandmother    8  Family history of malignant neoplasm (V16 9) (Z80 9)  Maternal Grandfather    9  Family history of malignant neoplasm (V16 9) (Z80 9)  Family History    10  Family history of Arthritis (716 90) (M19 90)   11  Family history of Back problem   12  Family history of Cancer (199 1) (C80 1)    Social History    · Caffeine use (V49 89) (F15 90)   · Never a smoker   · No illicit drug use   · Social drinker    Current Meds   1  BuPROPion HCl ER (XL) 150 MG Oral Tablet Extended Release 24 Hour; take 3 tablets   daily; Therapy: 73ZUE5565 to (Trevor Khan)  Requested for: 02QDL4456; Last   Rx:28Feb2017 Ordered   2  Escitalopram Oxalate 20 MG Oral Tablet; TAKE 1 TABLET DAILY; Therapy: 68QVE2712 to (Trevor Khan)  Requested for: 07YGU2467; Last   Rx:28Feb2017 Ordered   3  Melatonin 3 MG Oral Tablet; TAKE 1 OR 2 TABLETS AT BEDTIME AS NEEDED; Therapy: 96NFL8388 to (Evaluate:29Apr2017); Last Rx:30Mar2017 Ordered   4  TiZANidine HCl - 4 MG Oral Tablet; TAKE 1 TABLET Twice daily PRN SPASM; Therapy: 78OFE1333 to (Jose Luis Query)  Requested for: 21DDR5591; Last   Rx:08Mar2017 Ordered   5  TraZODone HCl - 100 MG Oral Tablet; TAKE 1 OR 2 TABLETS AT BEDTIME AS NEEDED   FOR SLEEP; Therapy: 77QUY7174 to (Evaluate:05Jun2017)  Requested for: 94RFW0360; Last   Rx:07Mar2017 Ordered   6  Vitamin D (Ergocalciferol) 39447 UNIT Oral Capsule; TAKE 1 CAPSULE WEEKLY;    Therapy: 45HPB6571 to (Evaluate:28Jun2017)  Requested for: 37VKU0885; Last   Rx:28Idu8085 Ordered   7  Zolpidem Tartrate 10 MG Oral Tablet; TAKE 1 TABLET AT BEDTIME AS NEEDED; Therapy: 87ICV9465 to (Evaluate:22Mar2017); Last Rx:07Mar2017 Ordered    Allergies    1  Penicillins    Vitals  Signs   Recorded: 93CSL4036 02:04PM   Height: 5 ft 0 5 in  Weight: 301 lb 9 6 oz  BMI Calculated: 57 93  BSA Calculated: 2 24    Future Appointments    Date/Time Provider Specialty Site   01/03/2018 07:45 AM Amy Angel, [address] Endocrinology [facility]CRINOLOGY [facility]   04/11/2017 02:15 PM HAMIDA Nolan   Psychiatry [facility]     Signatures   Electronically signed by : BRYANT Guthrie; Mar 31 2017  2:04PM EST                       (Author)    Electronically signed by : HAMIDA Acharya ; Apr 6 2017  3:46PM EST                       (Validation)

## 2018-01-14 NOTE — MISCELLANEOUS
Message   Recorded as Task   Date: 06/05/2017 10:03 AM, Created By: Jonathan Quispe   Task Name: Follow Up   Assigned To: Rudolph Andrade   Regarding Patient: Patsy Carey, Status: In Progress   CommentSudhir Valdovinos - 05 Jun 2017 10:03 AM     TASK CREATED  Pt is S/P R T11-12 INTEVERTEBRAL NERVE BLOCK on 5/30/17 by Dr Abby Machado at [facility]  No pain diary scanned in  No f/u scheduled   Mackenzie Collado - 06 Jun 2017 3:40 PM     TASK EDITED  1st attempt to reach pt  LM on VM   Patrick Saavedra - 07 Jun 2017 7:37 AM     TASK REPLIED TO: Previously Assigned To [facility] jeannie procedure,Team  Pt was transferred to [place] procedure - LM on 6/6 at 4:30 returning call to [place]  Please cb on his house phone, [phone]  [facility] - 08 Jun 9384 29:99 AM     TASK EDITED  2nd Attempt     LM on VM to CB   Lilian Paulino - 09 Jun 2017 1:50 PM     TASK EDITED  Pt returned call stating he had no relief following injection  Pls call pt at [phone]  [facility] - 09 Jun 4189 5:32 PM     TASK EDITED  Pt reports no relief from injection - he reports alot of pain with performing his ADLs - He said he stopped take the Naproxen to see if the injection helped but he wants to know if that does not work is there anything else you recommend? Pt does not have a f/u   Lukas Copeland - 16 Jun 2017 8:11 AM     TASK REPLIED TO: Previously Assigned To [facility] jeannie clinical,Team  honestly i'm not really sure what else to try other than something very invasive such as nerve stimulation, but would not have him try that until after his gastric procedure   Danni Khalil - 16 Jun 2017 10:11 AM     TASK EDITED  Attempted to reach pt on phone number pt requested call back on above  [facility] for return call with c/b number office location and hours provided  Yadira Rea - 21 Jun 2017 2:30 PM     TASK EDITED    2nd attempt to contact pt   LMOM for pt on home and cell #  **********   Sharonda Garay - 23 Jun 2017 3:45 PM     TASK EDITED  Pt advised of Dr Leopold Junes response  Pt asking if he can take something more for his pain, right now he takes 1 tab Aleve Q12 hrs  Can he take more of that or he has some prescription naproxen 500mg Q12 hrs from a sx he had in the past   Pt said his next appt with the Bariatric doctor is not until 7/20  FQ to advise  Gabriela Foster - 23 Jun 2017 4:00 PM     TASK REPLIED TO: Previously Assigned To Gabriela Foster  e-rx sent for nex rx for naproxen 500mg BID   Sharonda Garay - 23 Jun 2017 4:03 PM     TASK IN PROGRESS   Sharonda Garay - 23 Jun 2017 4:05 PM     TASK EDITED  Pt advised of the same, aware to take with food or after meal  Pt aware not to take any other NSAIDS while taking the prescription naproxen  Pt told RX [facility] his [facility]         Active Problems    1  Adrenal adenoma (227 0) (D35 00)   2  Bilateral carpal tunnel syndrome (354 0) (G56 03)   3  Carpal tunnel syndrome of right wrist (354 0) (G56 01)   4  Chronic right-sided thoracic back pain (724 1,338 29) (M54 6,G89 29)   5  CMC arthritis, thumb, degenerative (715 34) (M18 9)   6  Cubital tunnel syndrome (354 2) (G56 20)   7  Cubital tunnel syndrome of both upper extremities (354 2) (G56 23)   8  Elbow pain (719 42) (M25 529)   9  ALYSHA (generalized anxiety disorder) (300 02) (F41 1)   10  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)   11  Long term current use of therapeutic drug (V58 69) (Z79 899)   12  Lumbago (724 2) (M54 5)   13  Major depressive disorder, recurrent, severe without psychotic behavior (296 33) (F33 2)   14  Median nerve neuritis, unspecified laterality (354 1) (G56 10)   15  Morbid obesity (278 01) (E66 01)   16  Myofascial pain syndrome (729 1) (M79 1)   17  Other insomnia (780 52) (G47 09)   18  Pain in both hands (729 5) (M79 641,M79 642)   19  Postoperative examination (V67 00) (Z09)   20  Postoperative wound dehiscence, initial encounter (998 32) (T81 31XA)   21   Postoperative wound dehiscence, subsequent encounter (V58 89,998 32) (T81 31XD)   22  Pre-operative clearance (V72 84) (Z01 818)   23  Sleep apnea (780 57) (G47 30)   24  Thoracic compression fracture (805 2) (S22 000A)   25  Tinea capitis (110 0) (B35 0)   26  Ulnar 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 of left upper extremity (354 2) (G56 22)   27  Umbilical hernia (632 8) (K42 9)   28  Vitamin D deficiency (268 9) (E55 9)    Current Meds   1  ARIPiprazole 2 MG Oral Tablet; TAKE 1 TABLET AT BEDTIME; Therapy: 18Ctc4828 to (Evaluate:2017)  Requested for: 96Jpa0299; Last   Rx:18Ejf3386 Ordered   2  BuPROPion HCl ER (XL) 150 MG Oral Tablet Extended Release 24 Hour; take 3 tablets   daily; Therapy: 22ROC3894 to (Evaluate:2017)  Requested for: 56Kqe8558; Last   Rx:10Mie1035 Ordered   3  Escitalopram Oxalate 20 MG Oral Tablet; TAKE 1 TABLET DAILY; Therapy: 83WED8574 to (Evaluate:2017)  Requested for: 67Kua3083; Last   Rx:88Onu3454 Ordered   4  Naproxen 500 MG Oral Tablet; TAKE 1 TABLET TWICE DAILY AFTER MEALS; Therapy: 67CNK0340 to (Yue Barlow)  Requested for: 85APD2154; Last   C13AKL2015 Ordered   5  Vitamin B6 200 MG Oral Tablet; TAKE 1 TABLET DAILY AS DIRECTED; Therapy: 05OWA9473 to (Evaluate:2017)  Requested for: 09KLN8090; Last   FS:86NUK7494 Ordered   6  Vitamin D (Ergocalciferol) 52403 UNIT Oral Capsule; TAKE 1 CAPSULE WEEKLY; Therapy: 38EMP3972 to (Evaluate:2017)  Requested for: 54MDU4326; Last   Rx:52Ppu4214 Ordered   7  Zolpidem Tartrate 10 MG Oral Tablet; TAKE 1 TABLET AT BEDTIME AS NEEDED; Therapy: 56OAX0847 to (Evaluate:07Hmc7501); Last Rx:31Els6930 Ordered    Allergies    1   Penicillins    Signatures   Electronically signed by : Gerson Gordon, ; 2017  4:05PM EST                       (Author)

## 2018-01-14 NOTE — PSYCH
Assessment    1  ALYSHA (generalized anxiety disorder) (300 02) (F41 1)   2  Major depressive disorder, recurrent, severe without psychotic behavior (296 33) (F33 2)   3  Insomnia (780 52) (G47 00)    Plan    1  Follow-up Visit in 4 Weeks Evaluation and Treatment  Follow-up  Status: Hold For -   Scheduling  Requested for: 78ROO7498    2  Escitalopram Oxalate 20 MG Oral Tablet; TAKE 1 TABLET DAILY    3  TraZODone HCl - 50 MG Oral Tablet; TAKE 1 OR 2 TABLETS AT BEDTIME AS   NEEDED FOR SLEEP    4  (1) CBC/PLT/DIFF; Status:Active; Requested for:73Ume3861;    5  (1) COMPREHENSIVE METABOLIC PANEL; Status:Active; Requested for:23Dfg4310;    6  (1) TSH; Status:Active; Requested for:20Ejd0479;     7  BuPROPion HCl ER (XL) 150 MG Oral Tablet Extended Release 24 Hour; take 3   tablets daily    8  Zolpidem Tartrate 10 MG Oral Tablet    Chief Complaint  Depression      History of Present Illness  Alba Ladd is a 50year old white male with a history of Major Depressive Disorder who presents for psychiatric treatment due to insurance change  He was in outpatient psychiatric treatment with Dr Daniel Cochran for over 1 year, prior to that he was seeing his PCP for psychiatric medication monitoring  Alba Ladd reports he became more depressed in 2012 after work related injury when he lost his job  Currently despite being on 2 antidepressants he continues to experience feelings of depression and anxiety  He feels sad, hopeless at times, worries on daily basis  No panic attacks  He isolates himself, has low energy and low motivation  Denies suicidal or homicidal ideation  No auditory or visual hallucinations  Reports chronic difficulty sleeping, sometimes sleeps too long, also has vivid nightmares  Appetite fluctuates  Gained over 80 lb in 1 year  No history of manic episodes  No history of eating disorder  No OCD symptoms  No side effects from medications reported except vivid dreams due to Ambien  PHQ-9 is 22 today          Review of Systems  anxiety, depression and sleep disturbances  Constitutional: recent 80 lb weight gain  ENT: no complaints of earache, no loss of hearing, no nosebleeds or nasal discharge, no sore throat or hoarseness  Cardiovascular: no complaints of slow or fast heart rate, no chest pain, no palpitations, no leg claudication or lower extremity edema  Respiratory: shortness of breath  Gastrointestinal: abdominal pain and bloating  Genitourinary: no complaints of dysuria or incontinence, no hesitancy, no nocturia, no genital lesion, no inadequacy of penile erection  Musculoskeletal: lower back pain  Integumentary: no complaints of skin rash or lesion, no itching or dry skin, no skin wounds  Neurological: no complaints of headache, no confusion, no numbness or tingling, no dizziness or fainting  ROS reviewed  Past Psychiatric History    Past Psychiatric History: History of depression since 2006  No history of inpatient psychiatric admissions  Was in treatment with Dr Jameel Tolbert for over 1 year and with PCP for several years prior to that  Has a therapist Quita Weber  No history of past suicide attempts  Past medication trials with Wellbutrin, Lexapro, Ambien  Substance Abuse Hx    Substance Abuse History: Social alcohol use 1 drink per month  No recreational drug use  No tobacco           Active Problems    1  Adrenal adenoma (227 0) (D35 00)   2  Bilateral carpal tunnel syndrome (354 0) (G56 03)   3  Carpal tunnel syndrome of right wrist (354 0) (G56 01)   4  Chronic right-sided thoracic back pain (724 1,338 29) (M54 6,G89 29)   5  CMC arthritis, thumb, degenerative (715 34) (M18 9)   6  Cubital tunnel syndrome (354 2) (G56 20)   7  Cubital tunnel syndrome of both upper extremities (354 2) (G56 23)   8  Lumbago (724 2) (M54 5)   9  Morbid obesity (278 01) (E66 01)   10  Myofascial pain syndrome (729 1) (M79 1)   11  Pain in both hands (729 5) (M79 641,M79 642)   12   Postoperative examination (V67 00) (Z09)   13  Postoperative wound dehiscence, initial encounter (998 32) (T81 31XA)   14  Postoperative wound dehiscence, subsequent encounter (V58 89,998 32) (T81 31XD)   15  Sleep apnea (780 57) (G47 30)   16  Thoracic compression fracture (805 2) (S22 000A)   17  Tinea capitis (110 0) (B35 0)   18  Ulnar 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 of left upper extremity (354 2) (G56 22)   19  Umbilical hernia (669 3) (K42 9)   20  Vitamin D deficiency (268 9) (E55 9)    Past Medical History    1  History of Anxiety (300 00) (F41 9)   2  History of depression (V11 8) (Z86 59)   3  Denied: History of head injury   4  Denied: History of Seizures   5  History of Skin lesion of scalp (709 9) (L98 9)   6  History of Status post hernia repair (V45 89) (Z98 890,Z87 19)    The active problems and past medical history were reviewed and updated today  Surgical History    The surgical history was reviewed and updated today  Allergies    1  Penicillins    Current Meds   1  Escitalopram Oxalate 20 MG Oral Tablet; Therapy: (Recorded:31Zyi5646) to Recorded   2  TiZANidine HCl - 4 MG Oral Tablet; TAKE 1 TABLET AT BEDTIME; Therapy: 73DMP6833 to (Evaluate:66Lus2893)  Requested for: 48RKF8971; Last   Rx:00Aeg8097 Ordered   3  Vitamin D (Ergocalciferol) 86877 UNIT Oral Capsule; TAKE 1 CAPSULE WEEKLY; Therapy: 30RQP8183 to (Evaluate:23Ato5886)  Requested for: 55VXU8834; Last   Rx:21Pck3607 Ordered   4  Zolpidem Tartrate 10 MG Oral Tablet; Therapy: 62YGN4134 to Recorded    The medication list was reviewed and updated today  Family Psych History  Mother    1  Family history of malignant neoplasm (V16 9) (Z80 9)   2  Denied: FH: mental illness  Father    3  Family history of arthritis (V17 7) (Z82 61)   4  Family history of osteoporosis (V17 81) (Z82 62)   5  Denied: FH: mental illness  Sister    6  Family history of depression (V17 0) (Z81 8)   7   Family history of suicide attempt (V17 0) (Z81 8)  Maternal Grandmother    8  Family history of malignant neoplasm (V16 9) (Z80 9)  Maternal Grandfather    9  Family history of malignant neoplasm (V16 9) (Z80 9)  Family History    10  Family history of Arthritis (716 90) (M19 90)   11  Family history of Back problem   12  Family history of Cancer (199 1) (C80 1)    The family history was reviewed and updated today  Social History    · Caffeine use (V49 89) (F15 90)   · Never a smoker   · No illicit drug use   · Social drinker  The social history was reviewed and updated today   for 15 years  Lives with parents  Has adult son 24years old  Currently unemployed, 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  Some college  No history of legal problems  No  history  History Of Phys/Sex Abuse Or Perpetration    History Of Phys/Sex Abuse or Perpetration: No history of physical or sexual abuse  Vitals  Signs   Recorded: 52Oni8617 02:41PM   Heart Rate: 87  Systolic: 698  Diastolic: 85  BP Cuff Size: Large  Height: 5 ft 8 5 in  Weight: 298 lb   BMI Calculated: 44 65  BSA Calculated: 2 43    Physical Exam    Appearance: was calm and cooperative, adequate hygiene and grooming and good eye contact  Observed mood: depressed and anxious  Observed mood: affect was constricted  Speech: speech soft  Thought processes: coherent/organized  Hallucinations: no hallucinations present  Thought Content: no delusions  Abnormal Thoughts: The patient has no suicidal thoughts and no homicidal thoughts  Orientation: The patient is oriented to person, place and time  Recent and Remote Memory: short term memory intact and long term memory intact  Attention Span And Concentration: concentration impaired  Insight: Insight intact  Judgment: His judgment was intact  Muscle Strength And Tone  Muscle strength and tone were normal  Normal gait and station     Language: no difficulty naming common objects, no difficulty repeating a phrase and no difficulty writing a sentence  Fund of knowledge: Patient displays adequate knowledge of current events, adequate fund of knowledge regarding past history and adequate fund of knowledge regarding vocabulary  The patient is experiencing moderate to severe pain  On a scale of 0 - 10 the pain severity is a 7  Treatment Recommendations: Increase Wellbutrin XL to 450 mg daily to address depression  Continue Lexapro 20 mg daily for now  Discontinue Ambien  Trial of Trazodone 50 mg to 100 mg at bedtime as needed  Therapy with own therapist  Baseline labs (CBC, CMP, TSH)    Risks, Benefits And Possible Side Effects Of Medications: Risks, benefits, and possible side effects of medications explained to patient and patient verbalizes understanding  End of Encounter Meds    1  Escitalopram Oxalate 20 MG Oral Tablet; TAKE 1 TABLET DAILY; Therapy: 70CSL6405 to (Evaluate:29May2017); Last Rx:28Feb2017 Ordered    2  TraZODone HCl - 50 MG Oral Tablet; TAKE 1 OR 2 TABLETS AT BEDTIME AS NEEDED   FOR SLEEP; Therapy: 24BUU6642 to (Evaluate:29May2017); Last Rx:09Kta5168 Ordered    3  BuPROPion HCl ER (XL) 150 MG Oral Tablet Extended Release 24 Hour; take 3 tablets   daily; Therapy: 39JGV3096 to (Evaluate:29May2017); Last Rx:12Fcn7067 Ordered    4  TiZANidine HCl - 4 MG Oral Tablet; TAKE 1 TABLET AT BEDTIME; Therapy: 64UFI0415 to (Evaluate:14Apr2017)  Requested for: 34ECG3480; Last   Rx:91Hdn0428 Ordered    5  Vitamin D (Ergocalciferol) 92913 UNIT Oral Capsule; TAKE 1 CAPSULE WEEKLY; Therapy: 02KNV4340 to (Evaluate:28Jun2017)  Requested for: 35IBQ8637; Last   Rx:16Wzr3938 Ordered    6  Escitalopram Oxalate 20 MG Oral Tablet; Therapy: (Recorded:41Tzu5453) to Recorded    Future Appointments    Date/Time Provider Specialty Site   01/03/2018 07:45 AM Manuela Luo, [address] Endocrinology [facility] ENDOCRINOLOGY [facility]   03/29/2017 09:30 AM HAMIDA Graham   [place] [address] 03/22/2017 10:40 AM HAMIDA Vallejo   Cardiology  [facility]     Signatures   Electronically signed by : HAMIDA Calvert ; Feb 28 2017  3:04PM EST                       (Author)

## 2018-01-15 VITALS
HEART RATE: 82 BPM | DIASTOLIC BLOOD PRESSURE: 66 MMHG | WEIGHT: 292.56 LBS | HEIGHT: 69 IN | BODY MASS INDEX: 43.33 KG/M2 | SYSTOLIC BLOOD PRESSURE: 112 MMHG

## 2018-01-15 VITALS — WEIGHT: 295.06 LBS | BODY MASS INDEX: 49.16 KG/M2 | HEIGHT: 65 IN

## 2018-01-15 VITALS
BODY MASS INDEX: 41.92 KG/M2 | SYSTOLIC BLOOD PRESSURE: 132 MMHG | HEART RATE: 79 BPM | DIASTOLIC BLOOD PRESSURE: 86 MMHG | HEIGHT: 69 IN | WEIGHT: 283 LBS

## 2018-01-15 VITALS
SYSTOLIC BLOOD PRESSURE: 119 MMHG | BODY MASS INDEX: 47.48 KG/M2 | HEART RATE: 90 BPM | DIASTOLIC BLOOD PRESSURE: 78 MMHG | WEIGHT: 285 LBS | HEIGHT: 65 IN

## 2018-01-15 NOTE — PROGRESS NOTES
Discussion/Summary  Discussion Summary:   Assess: Pt here for monthly weigh in  Pt lost 10 pounds  Pt does not have any significant changes in diagnosis or medication  Patient is doing the following activity/exercise: unable due to severe back pain Pt has accomplished the following goals: He is drinking 1 to 2 premier protein shakes per day He is eating 2 meals per day -uses a smaller plate He is mowing the grass several times per week for activity Injections did not control pain, currently ordered naproxen for pain Nutrition Diagnosis: Obesity related to sedentary lifestyle and excess energy intake as evidenced by BMI Intervention: Reviewed work flow  Patient cleared by psych, has appointment scheduled with Dr Judi Massey for next month  Encouraged completion of lesson plan number 2 to 4 in bariatric booklet  Pt was receptive and verbalized understanding  Pt will work on the following goals: Activity /exercise goal: discuss with pcp or pain management acceptable activity Time meals to last 15 to 20 minutes practice 30/30 rule Monitoring/evaluation: Pt will lose 2 to 4 pounds by next appointment Follow up scheduled for : one month with surgeon  Active Problems    1  Adrenal adenoma (227 0) (D35 00)   2  Bilateral carpal tunnel syndrome (354 0) (G56 03)   3  Carpal tunnel syndrome of right wrist (354 0) (G56 01)   4  Chronic right-sided thoracic back pain (724 1,338 29) (M54 6,G89 29)   5  CMC arthritis, thumb, degenerative (715 34) (M18 9)   6  Cubital tunnel syndrome (354 2) (G56 20)   7  Cubital tunnel syndrome of both upper extremities (354 2) (G56 23)   8  Elbow pain (719 42) (M25 529)   9  ALYSHA (generalized anxiety disorder) (300 02) (F41 1)   10  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)   11  Long term current use of therapeutic drug (V58 69) (Z79 899)   12  Lumbago (724 2) (M54 5)   13  Major depressive disorder, recurrent, severe without psychotic behavior (296 33) (F33 2)   14   Median nerve neuritis, unspecified laterality (354 1) (G56 10)   15  Morbid obesity (278 01) (E66 01)   16  Myofascial pain syndrome (729 1) (M79 1)   17  Other insomnia (780 52) (G47 09)   18  Pain in both hands (729 5) (M79 641,M79 642)   19  Postoperative examination (V67 00) (Z09)   20  Postoperative wound dehiscence, initial encounter (998 32) (T81 31XA)   21  Postoperative wound dehiscence, subsequent encounter (V58 89,998 32) (T81 31XD)   22  Pre-operative clearance (V72 84) (Z01 818)   23  Sleep apnea (780 57) (G47 30)   24  Thoracic compression fracture (805 2) (S22 000A)   25  Tinea capitis (110 0) (B35 0)   26  Ulnar 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 of left upper extremity (354 2) (G56 22)   27  Umbilical hernia (144 7) (K42 9)   28  Vitamin D deficiency (268 9) (E55 9)    Past Medical History    1  History of Anxiety (300 00) (F41 9)   2  History of depression (V11 8) (Z86 59)   3  History of folliculitis (R08 4) (N86 6)   4  Denied: History of head injury   5  History of Knee pain (719 46) (M25 569)   6  History of Liver lesion (573 8) (K76 9)   7  History of Neck pain (723 1) (M54 2)   8  History of Nodular adrenal cortex (255 8) (E27 8)   9  History of Pain, wrist joint (719 43) (M25 539)   10  Denied: History of Seizures   11  History of Skin lesion of scalp (709 9) (L98 9)   12  History of Status post hernia repair (V45 89) (Z98 890,Z87 19)    Surgical History    1  History of Excision Of Lesion Scalp   2  History of Eye Surgery   3  History of Hemorrhoidectomy   4  History of Hernia Repair   5  History of Knee Surgery   6  History of Neuroplasty Decompression Median Nerve At Carpal Tunnel   7  History of Umbilical Hernia Repair    Family History  Mother    1  Family history of malignant neoplasm (V16 9) (Z80 9)   2  Denied: FH: mental illness  Father    3  Family history of arthritis (V17 7) (Z82 61)   4  Family history of osteoporosis (V17 81) (Z82 62)   5  Denied: FH: mental illness  Sister    6   Family history of depression (V17 0) (Z81 8)   7  Family history of suicide attempt (V17 0) (Z81 8)  Maternal Grandmother    8  Family history of malignant neoplasm (V16 9) (Z80 9)  Maternal Grandfather    9  Family history of malignant neoplasm (V16 9) (Z80 9)  Family History    10  Family history of Arthritis (716 90) (M19 90)   11  Family history of Back problem   12  Family history of Cancer (199 1) (C80 1)    Social History    · Caffeine use (V49 89) (F15 90)   · Never a smoker   · No illicit drug use   · Social drinker    Current Meds   1  BuPROPion HCl ER (XL) 150 MG Oral Tablet Extended Release 24 Hour; take 3 tablets   daily; Therapy: 07CTS2737 to (Evaluate:24Nov2017)  Requested for: 98Nzv2027; Last   Rx:48Iof9953 Ordered   2  Escitalopram Oxalate 20 MG Oral Tablet; TAKE 1 TABLET DAILY; Therapy: 94LWE2851 to (Evaluate:24Nov2017)  Requested for: 30Bgs4658; Last   Rx:20Mkz9555 Ordered   3  Naproxen 500 MG Oral Tablet; TAKE 1 TABLET TWICE DAILY AFTER MEALS; Therapy: 14OLR9474 to (Yaritza Lara)  Requested for: 35RYR0387; Last   CV:93WFT4571 Ordered   4  Vitamin B6 200 MG Oral Tablet; TAKE 1 TABLET DAILY AS DIRECTED; Therapy: 24ESD0685 to (Evaluate:40Bbp6779)  Requested for: 60DNR0683; Last   SS:54KCD3796 Ordered   5  Vitamin D (Ergocalciferol) 66570 UNIT Oral Capsule; TAKE 1 CAPSULE WEEKLY; Therapy: 84SJB7497 to (Evaluate:28Jun2017)  Requested for: 68QGG0247; Last   Rx:75Pxm8339 Ordered   6  Zolpidem Tartrate 10 MG Oral Tablet; TAKE 1 TABLET AT BEDTIME AS NEEDED; Therapy: 18MPS0669 to (Evaluate:24Nov2017); Last Rx:64Ojy8775 Ordered    Allergies    1  Penicillins    Vitals  Signs   Recorded: 28Jul2017 01:29PM   Height: 5 ft 5 in  Weight: 284 lb 8 0 oz  BMI Calculated: 47 34  BSA Calculated: 2 30    Future Appointments    Date/Time Provider Specialty Site   01/03/2018 07:45 AM Jayleen Sprinkle, [address] Endocrinology [facility] ENDOCRINOLOGY [facility]   08/09/2017 09:00 AM HAMIDA Nichols   General Surgery [facility]   08/30/2017 02:30 PM HAMIDA Knight  Psychiatry [facility]     Signatures   Electronically signed by : BRYANT Shin; Jul 28 2017  1:30PM EST                       (Author)    Electronically signed by :  HAMIDA Hillman ; Aug  2 2017  1:48PM EST

## 2018-01-15 NOTE — MISCELLANEOUS
Message   Recorded as Task   Date: 03/07/2017 02:29 PM, Created By: Mariia Sandoval   Task Name: Miscellaneous   Assigned To: Derrick Haywood   Regarding Patient: Declan Bustamante, Status: Active   Comment:    Mariia Sandoval - 07 Mar 2017 2:29 PM     TASK CREATED  Please give this patient a call  He is stating that the meds prescribed are not working  (tizanidine)  [phone]  [facility],[place] [address] Mar 2017 3:35 PM     TASK EDITED  1  S/W pt who said he's been on the tizanidine QHS for 3 wks and his symptoms have not gone away he has the pain constantly  He has pain in the front on the Rt side below the ribs and also in his back on the Rt side, mid to LB area  Pt was not feeling drowsy with the tizanidine but also takes Ambien at HS and he noticed once he started taking the Tizanidine his dreams got worse  2  Pt said he saw a video on the internet of a sx that is performed and saw the TV show "The Doctors" and it was about "slipping rib syndrome " He said they say this is often misdiagnosed  He said he has been going through his symptoms for 2 yrs and no one has ever mentioned this syndrome to him and he's had several traumatic events to his lower ribs  He said he has called (2) doctors on the [address] [place] [address] in [facility] and is trying to find someone who performs or treat pts for this syndrome on the [facility]   Pt asking if FQ has ever heard of this syndrome and if this is something he should look into or does he know of someone on the [facility] that deals with this? Told pt I would forward his concerns to FQ and will c/b once with have his rec  **Pt said he is going out of town from 3/9-3/19 and the best way to reach him would be on his cell # [phone]  Cesarafia Alexis - 07 Mar 2017 3:52 PM     TASK REPLIED TO: Previously Assigned To Silver Crumble  that's costochondritis and he doesn't have that (it's where the ribs attach in the front near the sternum)    he may have intercostal neuralgia, but he also has significant muscle spasm     have him start taking the tizanidine BID and call with update in 2 weeks   Sharonda Garay - 07 Mar 2017 4:22 PM     TASK REMOVED   Sharonda Garay - 07 Mar 2017 4:23 PM     TASK UNREMOVED   Sharonda Garay - 07 Mar 2017 4:33 PM     TASK EDITED  S/W pt and advised of the same  Pt said he only has 10 pills of tizandine left and is going out of town for 2 wks on Thurs so he is asking for you to [facility] [address], on [facility]  Pt confirmed he will c/b in 2 wks with an update  **Per pt his [facility] will text [facility] once Rx is ready for pt **   Digna Mccarty - 08 Mar 2017 8:15 AM     TASK REPLIED TO: Previously Assigned To Digna Mccarty  e-rx sent to his pharmacy   Sharonda Garay - 08 Mar 2017 8:27 AM     TASK EDITED  VM left on cell # provided that his tizanidine was sent to his [facility] this morning  Active Problems    1  Adrenal adenoma (227 0) (D35 00)   2  Bilateral carpal tunnel syndrome (354 0) (G56 03)   3  Carpal tunnel syndrome of right wrist (354 0) (G56 01)   4  Chronic right-sided thoracic back pain (724 1,338 29) (M54 6,G89 29)   5  CMC arthritis, thumb, degenerative (715 34) (M18 9)   6  Cubital tunnel syndrome (354 2) (G56 20)   7  Cubital tunnel syndrome of both upper extremities (354 2) (G56 23)   8  ALYSHA (generalized anxiety disorder) (300 02) (F41 1)   9  Insomnia (780 52) (G47 00)   10  Long term current use of therapeutic drug (V58 69) (Z79 899)   11  Lumbago (724 2) (M54 5)   12  Major depressive disorder, recurrent, severe without psychotic behavior (296 33) (F33 2)   13  Morbid obesity (278 01) (E66 01)   14  Myofascial pain syndrome (729 1) (M79 1)   15  Pain in both hands (729 5) (M79 641,M79 642)   16  Postoperative examination (V67 00) (Z09)   17  Postoperative wound dehiscence, initial encounter (998 32) (T81 31XA)   18  Postoperative wound dehiscence, subsequent encounter (V58 89,998 32) (T81 31XD)   19   Sleep apnea (780 57) (G47 30)   20  Thoracic compression fracture (805 2) (S22 000A)   21  Tinea capitis (110 0) (B35 0)   22  Ulnar 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 of left upper extremity (354 2) (G56 22)   23  Umbilical hernia (154 6) (K42 9)   24  Vitamin D deficiency (268 9) (E55 9)    Current Meds   1  BuPROPion HCl ER (XL) 150 MG Oral Tablet Extended Release 24 Hour; take 3 tablets   daily; Therapy: 93PVP7889 to ([employer])  Requested for: 24DEI2050; Last   Rx:28Feb2017 Ordered   2  Escitalopram Oxalate 20 MG Oral Tablet; TAKE 1 TABLET DAILY; Therapy: 38FET2628 to ([employer])  Requested for: 67TQA7287; Last   Rx:72Jws6710 Ordered   3  Escitalopram Oxalate 20 MG Oral Tablet; Therapy: (Recorded:06Arx3932) to Recorded   4  TiZANidine HCl - 4 MG Oral Tablet; TAKE 1 TABLET Twice daily PRN SPASM; Therapy: 91CSS1348 to ([employer])  Requested for: 59SVC0628; Last   Rx:08Mar2017 Ordered   5  TraZODone HCl - 100 MG Oral Tablet; TAKE 1 OR 2 TABLETS AT BEDTIME AS NEEDED   FOR SLEEP; Therapy: 19FNQ5531 to (Evaluate:05Jun2017)  Requested for: 36RNO6096; Last   Rx:07Mar2017 Ordered   6  Vitamin D (Ergocalciferol) 81900 UNIT Oral Capsule; TAKE 1 CAPSULE WEEKLY; Therapy: 03UBL8836 to (Evaluate:28Jun2017)  Requested for: 35UGM0614; Last   Rx:79Qst7335 Ordered   7  Zolpidem Tartrate 10 MG Oral Tablet; TAKE 1 TABLET AT BEDTIME AS NEEDED; Therapy: 66BQB5568 to (Evaluate:22Mar2017); Last Rx:07Mar2017 Ordered    Allergies    1   Penicillins    Signatures   Electronically signed by : Marcos Vasquez, ; Mar  8 2017  8:28AM EST                       (Author)

## 2018-01-15 NOTE — PROGRESS NOTES
Active Problems    1  Adrenal adenoma (227 0) (D35 00)   2  Bilateral carpal tunnel syndrome (354 0) (G56 03)   3  Carpal tunnel syndrome of right wrist (354 0) (G56 01)   4  Chronic right-sided thoracic back pain (724 1,338 29) (M54 6,G89 29)   5  CMC arthritis, thumb, degenerative (715 34) (M18 9)   6  Cubital tunnel syndrome (354 2) (G56 20)   7  Cubital tunnel syndrome of both upper extremities (354 2) (G56 23)   8  Elbow pain (719 42) (M25 529)   9  ALYSHA (generalized anxiety disorder) (300 02) (F41 1)   10  Long term current use of therapeutic drug (V58 69) (Z79 899)   11  Lumbago (724 2) (M54 5)   12  Major depressive disorder, recurrent, severe without psychotic behavior (296 33) (F33 2)   13  Morbid obesity (278 01) (E66 01)   14  Myofascial pain syndrome (729 1) (M79 1)   15  Other insomnia (780 52) (G47 09)   16  Pain in both hands (729 5) (M79 641,M79 642)   17  Postoperative examination (V67 00) (Z09)   18  Postoperative wound dehiscence, initial encounter (998 32) (T81 31XA)   19  Postoperative wound dehiscence, subsequent encounter (V58 89,998 32) (T81 31XD)   20  Pre-operative clearance (V72 84) (Z01 818)   21  Sleep apnea (780 57) (G47 30)   22  Thoracic compression fracture (805 2) (S22 000A)   23  Tinea capitis (110 0) (B35 0)   24  Ulnar 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 of left upper extremity (354 2) (G56 22)   25  Umbilical hernia (818 3) (K42 9)   26  Vitamin D deficiency (268 9) (E55 9)    Past Medical History    1  History of Anxiety (300 00) (F41 9)   2  History of depression (V11 8) (Z86 59)   3  History of folliculitis (X54 8) (N39 6)   4  Denied: History of head injury   5  History of Knee pain (719 46) (M25 569)   6  History of Liver lesion (573 8) (K76 9)   7  History of Neck pain (723 1) (M54 2)   8  History of Nodular adrenal cortex (255 8) (E27 8)   9  History of Pain, wrist joint (719 43) (M25 539)   10  Denied: History of Seizures   11  History of Skin lesion of scalp (709 9) (L98 9)   12  History of Status post hernia repair (V45 89) (Z98 890,Z87 19)    Surgical History    1  History of Excision Of Lesion Scalp   2  History of Eye Surgery   3  History of Hemorrhoidectomy   4  History of Hernia Repair   5  History of Knee Surgery   6  History of Neuroplasty Decompression Median Nerve At Carpal Tunnel   7  History of Umbilical Hernia Repair    Family History  Mother    1  Family history of malignant neoplasm (V16 9) (Z80 9)   2  Denied: FH: mental illness  Father    3  Family history of arthritis (V17 7) (Z82 61)   4  Family history of osteoporosis (V17 81) (Z82 62)   5  Denied: FH: mental illness  Sister    6  Family history of depression (V17 0) (Z81 8)   7  Family history of suicide attempt (V17 0) (Z81 8)  Maternal Grandmother    8  Family history of malignant neoplasm (V16 9) (Z80 9)  Maternal Grandfather    9  Family history of malignant neoplasm (V16 9) (Z80 9)  Family History    10  Family history of Arthritis (716 90) (M19 90)   11  Family history of Back problem   12  Family history of Cancer (199 1) (C80 1)    Social History    · Caffeine use (V49 89) (F15 90)   · Never a smoker   · No illicit drug use   · Social drinker    Current Meds   1  ARIPiprazole 2 MG Oral Tablet; TAKE 1 TABLET AT BEDTIME; Therapy: 11Apr2017 to (Evaluate:09Aug2017)  Requested for: 11Apr2017; Last   Rx:11Apr2017 Ordered   2  BuPROPion HCl ER (XL) 150 MG Oral Tablet Extended Release 24 Hour; take 3 tablets   daily; Therapy: 22PMH4708 to (Evaluate:09Aug2017)  Requested for: 11Apr2017; Last   Rx:11Apr2017 Ordered   3  Escitalopram Oxalate 20 MG Oral Tablet; TAKE 1 TABLET DAILY; Therapy: 54TAN5952 to (Evaluate:09Aug2017)  Requested for: 11Apr2017; Last   Rx:11Apr2017 Ordered   4  TiZANidine HCl - 4 MG Oral Tablet; TAKE 1 TABLET Twice daily PRN SPASM; Therapy: 97VDQ6698 to (Mariely Bowers)  Requested for: 49WNB3278; Last   Rx:08Mar2017 Ordered   5   Vitamin D (Ergocalciferol) 76514 UNIT Oral Capsule; TAKE 1 CAPSULE WEEKLY; Therapy: 37GWP7337 to (Evaluate:28Jun2017)  Requested for: 23VZJ3571; Last   Rx:71Scq7982 Ordered   6  Zolpidem Tartrate 10 MG Oral Tablet; TAKE 1 TABLET AT BEDTIME AS NEEDED; Therapy: 72KEG1192 to (Evaluate:85Maz2674); Last Rx:26Ufk3016 Ordered    Allergies    1  Penicillins     Note   Note:   Met with patient and discussed the following; weight loss( losing weight), chronic pain( waiting for a return phone call from pain management ) and workflow  Patient provided contact information for psychiatrist, next appointment late May, psychiatrist prescribed Ambien for sleep( helping) and I reviewed workflow  Patient attempted to attend April support group but was unable because of lack of parking and chronic pain  Patient making positive dietary and behavioral changes  Next visit in May  [place]      Future Appointments    Date/Time Provider Specialty Site   01/03/2018 07:45 AM Nicki Juan, [address] Endocrinology [facility] ENDOCRINOLOGY [facility]   05/26/2017 02:30 PM HAMIDA John  Psychiatry [facility]   05/09/2017 02:30 PM HAMIDA Perez  Orthopedic Surgery [facility]   05/25/2017 01:15 PM Lluvia Pedroza CRNP Pain Management [address]     Signatures   Electronically signed by : LAZARO Soto; Apr 20 2017  2:16PM EST                       (Author)    Electronically signed by :  HAMIDA Arreaga ; May  3 2017  9:23AM EST

## 2018-01-15 NOTE — PSYCH
Psych Med Mgmt    Appearance: was calm and cooperative and good eye contact  Observed mood: depressed and anxious  Observed mood: affect was constricted  Speech: a normal rate and fluent  Thought processes: coherent/organized  Hallucinations: no hallucinations present  Thought Content: no delusions  Abnormal Thoughts: The patient has no suicidal thoughts and no homicidal thoughts  Orientation: The patient is oriented to person, place and time  Recent and Remote Memory: short term memory intact and long term memory intact  Attention Span And Concentration: concentration impaired  Insight: Insight intact  Judgment: His judgment was intact  Muscle Strength And Tone  Muscle strength and tone were normal  Normal gait and station  Language: no difficulty naming common objects, no difficulty repeating a phrase and no difficulty writing a sentence  Fund of knowledge: Patient displays adequate knowledge of current events, adequate fund of knowledge regarding past history and adequate fund of knowledge regarding vocabulary  The patient is experiencing moderate to severe pain  On a scale of 0 - 10 the pain severity is an 8  Individual Psychotherapy 20 minutes provided today  Goals addressed in session: Counseling provided during the session today  Discussed with patient coping w with legal issues and difficulty making decisions regarding his bariatric surgery    Coping skills reviewed  Support provided  Treatment Recommendations: Continue Wellbutrin  mg daily  Continue Lexapro 20 mg daily  Discontinue Abilify - not taking  Continue Ambien 10 mg at bedtime as needed  Therapy with own therapist  Follows with PCP for lipid and glucose monitoring  Form for bariatric services completed today during the session     Risks, Benefits And Possible Side Effects Of Medications: Risks, benefits, and possible side effects of medications explained to patient and patient verbalizes understanding  The patient has been filling controlled prescriptions on time as prescribed to [facility]       He reports normal appetite, decreased energy, no weight change and normal number of sleep hours  Lucia Diamond states continues to feel frustrated with legal issues, but now feels anxiety is more controlled with recent advancement in his legal case  Also feels depression is less prominent, despite continuous struggle with chronic pain  Denies suicidal or homicidal ideation  No psychotic symptoms  Sleep is improved  Appetite is good  Involved with bariatric program   No side effects from medications reported  Has not been taking Abilify  PHQ-9 is decreased today at 12 (from 17 at the last visit)  Vitals  Signs   Recorded: 44Bcw0054 04:28PM   Heart Rate: 81  Systolic: 287  Diastolic: 82  BP Cuff Size: Large  Height: 5 ft 5 in  Weight: 285 lb   BMI Calculated: 47 43  BSA Calculated: 2 3    Assessment    1  ALYSHA (generalized anxiety disorder) (300 02) (F41 1)   2  Other insomnia (780 52) (G47 09)   3  Major depressive disorder, recurrent, severe without psychotic behavior (296 33) (F33 2)    Plan    1  Escitalopram Oxalate 20 MG Oral Tablet; TAKE 1 TABLET DAILY    2  BuPROPion HCl ER (XL) 150 MG Oral Tablet Extended Release 24 Hour; take 3   tablets daily    3  Zolpidem Tartrate 10 MG Oral Tablet; TAKE 1 TABLET AT BEDTIME AS NEEDED    Review of Systems    Constitutional: No fever, no chills, feels well, no tiredness, no recent weight gain or loss  Cardiovascular: no complaints of slow or fast heart rate, no chest pain, no palpitations  Respiratory: no complaints of shortness of breath, no wheezing, no dyspnea on exertion  Gastrointestinal: abdominal pain  Genitourinary: no complaints of dysuria, no incontinence, no pelvic pain, no urinary frequency  Musculoskeletal: hand pain and upper back pain     Integumentary: no complaints of skin rash, no itching, no dry skin  Neurological: no complaints of headache, no confusion, no numbness, no dizziness  Past Psychiatric History    Past Psychiatric History: History of depression since 2006  No history of inpatient psychiatric admissions  Was in treatment with Dr Sterling Luu for over 1 year and with PCP for several years prior to that  Has a therapist   No history of past suicide attempts  Past medication trials with Wellbutrin, Lexapro, Ambien  Substance Abuse Hx    Substance Abuse History: Social alcohol use 1 drink per month  No recreational drug use  No tobacco           Active Problems    1  Adrenal adenoma (227 0) (D35 00)   2  Bilateral carpal tunnel syndrome (354 0) (G56 03)   3  Carpal tunnel syndrome of right wrist (354 0) (G56 01)   4  Chronic right-sided thoracic back pain (724 1,338 29) (M54 6,G89 29)   5  CMC arthritis, thumb, degenerative (715 34) (M18 9)   6  Cubital tunnel syndrome (354 2) (G56 20)   7  Cubital tunnel syndrome of both upper extremities (354 2) (G56 23)   8  Elbow pain (719 42) (M25 529)   9  ALYSHA (generalized anxiety disorder) (300 02) (F41 1)   10  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)   11  Long term current use of therapeutic drug (V58 69) (Z79 899)   12  Lumbago (724 2) (M54 5)   13  Major depressive disorder, recurrent, severe without psychotic behavior (296 33) (F33 2)   14  Median nerve neuritis, unspecified laterality (354 1) (G56 10)   15  Morbid obesity (278 01) (E66 01)   16  Myofascial pain syndrome (729 1) (M79 1)   17  Other insomnia (780 52) (G47 09)   18  Pain in both hands (729 5) (M79 641,M79 642)   19  Postoperative examination (V67 00) (Z09)   20  Postoperative wound dehiscence, initial encounter (998 32) (T81 31XA)   21  Postoperative wound dehiscence, subsequent encounter (V58 89,998 32) (T81 31XD)   22  Pre-operative clearance (V72 84) (Z01 818)   23  Sleep apnea (780 57) (G47 30)   24  Thoracic compression fracture (805 2) (S22 000A)   25  Tinea capitis (110 0) (B35 0)   26  Ulnar 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 of left upper extremity (354 2) (G56 22)   27  Umbilical hernia (565 0) (K42 9)   28  Vitamin D deficiency (268 9) (E55 9)    Past Medical History    1  History of Anxiety (300 00) (F41 9)   2  History of depression (V11 8) (Z86 59)   3  History of folliculitis (E24 4) (X52 7)   4  Denied: History of head injury   5  History of Knee pain (719 46) (M25 569)   6  History of Liver lesion (573 8) (K76 9)   7  History of Neck pain (723 1) (M54 2)   8  History of Nodular adrenal cortex (255 8) (E27 8)   9  History of Pain, wrist joint (719 43) (M25 539)   10  Denied: History of Seizures   11  History of Skin lesion of scalp (709 9) (L98 9)   12  History of Status post hernia repair (V45 89) (Z98 890,Z87 19)    The active problems and past medical history were reviewed and updated today  Allergies    1  Penicillins    Current Meds   1  BuPROPion HCl ER (XL) 150 MG Oral Tablet Extended Release 24 Hour; take 3 tablets   daily; Therapy: 74KRB5689 to (Evaluate:80Ghg2414)  Requested for: 50Wsz4801; Last   Rx:62Dxe7971 Ordered   2  Escitalopram Oxalate 20 MG Oral Tablet; TAKE 1 TABLET DAILY; Therapy: 82GKR1127 to (Evaluate:83Vfh5221)  Requested for: 67Fba4791; Last   Rx:64Bfm0414 Ordered   3  Naproxen 500 MG Oral Tablet; TAKE 1 TABLET TWICE DAILY AFTER MEALS; Therapy: 51QSS8040 to (Monalisa Ho)  Requested for: 76BPK9897; Last   RI:76UYP6606 Ordered   4  Vitamin B6 200 MG Oral Tablet; TAKE 1 TABLET DAILY AS DIRECTED; Therapy: 01LSU2789 to (Evaluate:24Dse8803)  Requested for: 91WRI9673; Last   PU:95DNG7539 Ordered   5  Vitamin D (Ergocalciferol) 04036 UNIT Oral Capsule; TAKE 1 CAPSULE WEEKLY; Therapy: 79CZR6233 to (Evaluate:28Jun2017)  Requested for: 56WOJ7871; Last   Rx:93Nxh8009 Ordered   6  Zolpidem Tartrate 10 MG Oral Tablet; TAKE 1 TABLET AT BEDTIME AS NEEDED; Therapy: 00FRS8762 to (Evaluate:32Gpf5832);  Last Rx:11Apr2017 Ordered    The medication list was reviewed and updated today  Family Psych History  Mother    1  Family history of malignant neoplasm (V16 9) (Z80 9)   2  Denied: FH: mental illness  Father    3  Family history of arthritis (V17 7) (Z82 61)   4  Family history of osteoporosis (V17 81) (Z82 62)   5  Denied: FH: mental illness  Sister    6  Family history of depression (V17 0) (Z81 8)   7  Family history of suicide attempt (V17 0) (Z81 8)  Maternal Grandmother    8  Family history of malignant neoplasm (V16 9) (Z80 9)  Maternal Grandfather    9  Family history of malignant neoplasm (V16 9) (Z80 9)  Family History    10  Family history of Arthritis (716 90) (M19 90)   11  Family history of Back problem   12  Family history of Cancer (199 1) (C80 1)    The family history was reviewed and updated today  Social History    · Caffeine use (V49 89) (F15 90)   · Never a smoker   · No illicit drug use   · Social drinker  The social history was reviewed and updated today  The social history was reviewed and is unchanged  , lives with parents  Has one adult son  Currently unemployed, 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  Some college  No history of legal problems  No  history  History Of Phys/Sex Abuse Or Perpetration    History Of Phys/Sex Abuse or Perpetration: No history of physical or sexual abuse  End of Encounter Meds    1  Escitalopram Oxalate 20 MG Oral Tablet; TAKE 1 TABLET DAILY; Therapy: 17GYX2568 to (Evaluate:24Nov2017)  Requested for: 71Bng5629; Last   Rx:36Ddl2149 Ordered    2  Naproxen 500 MG Oral Tablet; TAKE 1 TABLET TWICE DAILY AFTER MEALS; Therapy: [facility]KVF3240 [facility])  Requested for: 10MKU2390; Last   Rx:23Jun2017 Ordered    3  BuPROPion HCl ER (XL) 150 MG Oral Tablet Extended Release 24 Hour; take 3 tablets   daily; Therapy: 98UWI6860 to (Evaluate:24Nov2017)  Requested for: 73Qra9836; Last   Rx:15Gdz3709 Ordered    4   Vitamin B6 200 MG Oral Tablet; TAKE 1 TABLET DAILY AS DIRECTED; Therapy: 05DQS7155 to (Evaluate:25Umy0594)  Requested for: 87RHZ1273; Last   SN:75IMV2114 Ordered    5  Zolpidem Tartrate 10 MG Oral Tablet; TAKE 1 TABLET AT BEDTIME AS NEEDED; Therapy: 73JDB5904 to (Evaluate:24Nov2017); Last Rx:80Umh1481 Ordered    6  Vitamin D (Ergocalciferol) 41139 UNIT Oral Capsule; TAKE 1 CAPSULE WEEKLY; Therapy: 21OAD4344 to (Evaluate:28Jun2017)  Requested for: 25Hjp2157; Last   Rx:33Dlt2548 Ordered    Future Appointments    Date/Time Provider Specialty Site   01/03/2018 07:45 AM Parish Rangel, [address]  [facility]inology [facility]   08/30/2017 02:30 PM HAMIDA Groves   Psychiatry [facility]     Signatures   Electronically signed by : HAMIDA Ness ; Jul 27 2017  4:52PM EST                       (Author)

## 2018-01-15 NOTE — PSYCH
Behavioral Health Outpatient Intake    Referred By: WIFE IS EMPLOYEE  Intake Questions: there are no developmental disabilities  the patient does not have a hearing impairment  the patient does not have an ICM or CTT  patient is not taking injectable psychiatric medications  Employment: The patient is not employed  Emergency Contact Information:   Emergency Contact: [address]   Relationship to Patient: WIFE   Phone Number: [phone]   Previous Psychiatric Treatment: He has previously been seen by a psychiatrist  Abby Ochoa  He has previously been seen by a therapist  DR SANDY CHAN-CURRENT   History: no  service  He has not had combat service  Insurance Subscriber: [address]   :    Address: [address] [place] [address]   Employer: [address] [place] [address]   Employer Address: [employer]   Primary Insurance: Lynda Goodness   ID number: [id number]         Presenting Problem (in patient's words): MDD,  Substance Abuse: NONE  Previous Treatment: The patient has not been seen here in the past      Accepted as Patient   DR Meg Blank 17 2PM     Primary Care Physician: DR Sarah Rushing       Signatures   Electronically signed by : Joleen Oliver, ; 2017  4:19PM EST                       (Author)

## 2018-01-16 NOTE — RESULT NOTES
Message   all adrenal labs are normal     Verified Results  (1) DHEA-S [id number] 08:12AM Arcampos Hassan     Test Name Result Flag Reference   DHEA-SULFATE 195 6 ug/dL  71 6 - 375 4   Performed at:  [facility] [employer] [address]  [id number]  : Ubaldo Vincent MD, Phone:  [phone]     (1) 29-75-24-36 08:12AM Arcampos Mo     Test Name Result Flag Reference   ADRENOCORT  TROP 59 1 pg/mL  7 2 - 63 3   ACTH reference interval for samples collected between 7 and 10 AM   Performed at:  [facility] [employer] [address]  [id number]  : Ubaldo Vincent MD, Phone:  [phone]     (1) RENIN ACTIVITY [id number] 08:12AM Rina Hassan     Test Name Result Flag Reference   RENIN 1 90 ng/mL/hr     Adult Normal Salt                           Intake:                             Upright         1 31 -  3 95                             Supine          0 15 -  2 33                          Salt Excretion                           (Na mEq/24 hr):                             Na=   0 -  30   8 82 - 23 86                             Na=  30 -  75   4 09 -  7 73                             Na=  75 - 150   1 44 -  2 80                             Na=      >150   0 39 -  1 31  **Effective January 23, 2017 the reference interval**    for Renin Activity, Plasma will be changing to: Age                Male          Female            0 - 11 months  2 000 - 37 000  2 000 - 37 000            1 -  3 years   1 700 - 11 200  1 700 - 11 200            4 -  5 years   1 000 -  6 500  1 000 -  6 500            6 - 10 years      500 -  5 900    500 -  5 900           11 - 15 years      500 -  3 300    500 -  3 300               >15 years      167 -  5 380    167 -  5 380  Performed at:  [address], [place]  [id number]  : Gabby Kwan MD, Phone:  [phone]     (1) ALDOSTERONE, BLOOD [id number] 08:12AM Rina Hassan Test Name Result Flag Reference   ALDOSTER, BLOOD 4 3 ng/dL  0 0 - 30 0   This test was developed and its performance characteristics  determined by [facility]  It has not been cleared or approved  by the Food and Drug Administration  Performed at:  [address]  [id number]  : Mat Maciel MD, Phone:  [phone]     (1) Preet List, PLASMA [id number] 08:12AM Belkys Mohamud     Test Name Result Flag Reference   METANEPHRINE, PLASMA 12 pg/mL  0 - 62   Concentrations of Normetanephrine between 146 and 487 pg/mL, and  Metanephrine between 63 and 255 pg/mL are considered indeterminate  Follow-up biochemical testing is recommended when patient levels fall  within this indeterminate range  These tests include repeat testing of  plasma/urinary fractionated metanephrines and plasma catecholamines    Performed at:  [address]  [id number]  : Mat Maciel MD, Phone:  [phone]   NORMETANEPHRINE, PLASMA 57 pg/mL  0 - 145

## 2018-01-16 NOTE — MISCELLANEOUS
Message  Patient had a scalp lesion excised on 12/8/16 by Dr Yared Johnson  He is now saying that their is an opening at the scalp size of his finger tip  It is bleeding a little he used a gauze to wipe of the blood though it is not soaking the gauze  We offered he to come in to see the doctor he agreed  Active Problems    1  Adrenal adenoma (227 0) (D35 00)   2  Bilateral carpal tunnel syndrome (354 0) (G56 03)   3  Chronic right-sided thoracic back pain (724 1,338 29) (M54 6,G89 29)   4  Cubital tunnel syndrome (354 2) (G56 20)   5  Cubital tunnel syndrome of both upper extremities (354 2) (G56 23)   6  Lumbago (724 2) (M54 5)   7  Skin lesion of scalp (709 9) (L98 9)   8  Sleep apnea (780 57) (G47 30)   9  Thoracic compression fracture (805 2) (S22 000A)   10  Umbilical hernia (322 0) (K42 9)   11  Vitamin D deficiency (268 9) (E55 9)    Current Meds   1  CeleXA 40 MG Oral Tablet (Citalopram Hydrobromide); Therapy: 58SMJ2746 to Recorded   2  RisperiDONE 0 5 MG Oral Tablet Recorded   3  Wellbutrin  MG Oral Tablet Extended Release 12 Hour (BuPROPion HCl ER   (SR)); Take 1 tablet daily Recorded   4  Zolpidem Tartrate 10 MG Oral Tablet; Therapy: 33GSY9372 to Recorded    Allergies    1   Penicillins    Signatures   Electronically signed by : Josette Jeter OM; Dec 15 2016  8:11AM EST                       (Author)

## 2018-01-18 NOTE — RESULT NOTES
Dear Anna Sullivan,   Your test results have returned and are listed below:      Discussion/Summary  Your results have some minor abnormalities, but nothing that is concerning  If you have any questions, please don't hesitate to call the office     Sincerely,      Signatures   Electronically signed by : BRYANT Hair; Sep 13 2017  4:03PM EST                       (Author)

## 2018-01-18 NOTE — PROGRESS NOTES
History of Present Illness  Bariatric MNT [facility] Screening Preop [facility]:     He was on time  the appointment lasted: 60 minutes  His surgeon is Dr Lindsey Paredes  The patient was present at the session  The diagnosis/reason for the appointment is: He has Grade III Obesity with a BMI of 45 6  He has the following comorbidities: major depression/anxiety, vitamin D deficiency, LI  Labs: Suzette Crum He was reminded to have his labs drawn   He does not need to monitor his glucose  Exercise Frequency:  He does not exercise  Relationship to food: He grazes and eats large portions  He knows the difference between being comfortably full and uncomfortably full and knows the difference between being stuffed and comfortably full  He felt/thought they felt his best at 220 pounds he last weighed this 2012  He completed a food journal He drinks 4-6 cups of water daily He drinks 2-4 caffienated beverages daily His motivators are:wants to sleep better, typically spends 12 hours sleeping, wants to reduce his depressive symptoms and be more active  His obesity/being overweight is related to excess energy intake and sedentary lfiestyle  and is evidenced by: BMI 45 6  Knowledge Deficit Prior to Education  He is new to the diet  Barriers to education:  He has no barriers to education  Medical Nutrition Therapy Intervention: Individualized Meal Plan, Daily Food /Exercise Diary, Lifestyle /Behavior Modification Techniques, Basic Pathophysiology of Obesity, Label Reading, Checklist of the Overview of Lesson Plans and Surgical changes to Stomach/GI  Area's Reviewed: Lifestyle Jennifer Pin Modification Techniques, [employer] [facility], [employer] ( hand out for [employer]) and Pre- surgery goals [place] Level  Brief Review of Vitamins, diet progression for post-op and Pathophysiology of Obesity  His comprehension of the presented material was good  His receptivity to the presented material was good  His motivation was good  Provided: Nutrition Guidelines for Gastric Surgery, Protein Supplement handout, Food Journaling Application handout, Bariatric Program Pre- Surgery Nutrition and Goals  Goals: His set goal for physical activity is increase steps/purchase pedometer , for 10-20 minutes, 5-7 days a week  Review [employer] in [place]   Post Surgery: He will adhere to the diet progression: remain hydrated, consume adequate protein; and take vitamins as outlined in guidelines  Patient is a 50year old who is here for nutritional evaluation for weight loss surgery  I reviewed co-morbidities and medications prior to session  He first recalls having problems with weight gain as an adult  His eating behaviors became worse after he lost his job and slipped into major depression  Since 2013, patient has gained 50 pounds  He has dieted in the past with variable success but would regain the weight back  (refer to diet history for details)  For his personal pre-op goals he will: have 3 meals per day: 2pm protein shake, 6pm dinner, 10pm : lunch food  He will also keep a written food log( forms provided)  He will complete all 6 lesson plans in his bariatric booklet  He was instructed on the importance of consistent vitamin and mineral intake after his surgery to prevent deficiencies  He currently takes a repletion dose of vitamin D   Recommended that he take an adult multivitamin/mineral supplement in addition to repletion dose of vitamin D Reviewed importance of support after surgery and discussed the web forum, pep rally and support group  Patient states adequate knowledge of nutrition, exercise and behavior modification required for long term success  Pt  is recommended for surgery and is aware that he will be required to follow the 2 weeks pre operative liver shrinking diet prior to surgery  Pt is also understands that he needs to implement lifestyle changes in preparation for surgery   Patient provided with samples of Bariatric products to try prior to surgery  Recommendations: He was provided contact information for any questions  He is recommended for surgery  He states adequate knowledge of nutrition, exercise, and behavior modification  He will attend a Team Meeting approximately 2-3 weeks prior to surgery  Active Problems    1  Adrenal adenoma (227 0) (D35 00)   2  Bilateral carpal tunnel syndrome (354 0) (G56 03)   3  Carpal tunnel syndrome of right wrist (354 0) (G56 01)   4  Chronic right-sided thoracic back pain (724 1,338 29) (M54 6,G89 29)   5  CMC arthritis, thumb, degenerative (715 34) (M18 9)   6  Cubital tunnel syndrome (354 2) (G56 20)   7  Cubital tunnel syndrome of both upper extremities (354 2) (G56 23)   8  Lumbago (724 2) (M54 5)   9  Morbid obesity (278 01) (E66 01)   10  Myofascial pain syndrome (729 1) (M79 1)   11  Postoperative examination (V67 00) (Z09)   12  Postoperative wound dehiscence, initial encounter (998 32) (T81 31XA)   13  Postoperative wound dehiscence, subsequent encounter (V58 89,998 32) (T81 31XD)   14  Sleep apnea (780 57) (G47 30)   15  Thoracic compression fracture (805 2) (S22 000A)   16  Tinea capitis (110 0) (B35 0)   17  Ulnar 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 of left upper extremity (354 2) (G56 22)   18  Umbilical hernia (425 5) (K42 9)   19  Vitamin D deficiency (268 9) (E55 9)    Past Medical History    1  History of Anxiety (300 00) (F41 9)   2  History of depression (V11 8) (Z86 59)   3  History of Skin lesion of scalp (709 9) (L98 9)   4  History of Status post hernia repair (V45 89) (Z98 890,Z87 19)    Surgical History    1  History of Excision Of Lesion Scalp   2  History of Hemorrhoidectomy   3  History of Hernia Repair   4  History of Knee Surgery   5  History of Neuroplasty Decompression Median Nerve At Carpal Tunnel   6  History of Umbilical Hernia Repair    Family History  Mother    1  Family history of malignant neoplasm (V16 9) (Z80 9)  Father    2   Family history of arthritis (V17 7) (Z82 61)   3  Family history of osteoporosis (V17 81) (Z82 62)  Maternal Grandmother    4  Family history of malignant neoplasm (V16 9) (Z80 9)  Maternal Grandfather    5  Family history of malignant neoplasm (V16 9) (Z80 9)  Family History    6  Family history of Arthritis (716 90) (M19 90)   7  Family history of Back problem   8  Family history of Cancer (199 1) (C80 1)    Social History    · Alcohol use (V49 89) (Z78 9)   · Caffeine use (V49 89) (F15 90)   · Never a smoker   · No illicit drug use   · Social drinker    Current Meds   1  Escitalopram Oxalate 20 MG Oral Tablet; Therapy: (Recorded:96Kne6880) to Recorded   2  TiZANidine HCl - 4 MG Oral Tablet; TAKE 1 TABLET AT BEDTIME; Therapy: 36PVH4497 to (Evaluate:14Apr2017)  Requested for: 97ZTO3748; Last   Rx:[id number] Ordered   3  Vitamin D (Ergocalciferol) 03952 UNIT Oral Capsule; TAKE 1 CAPSULE WEEKLY; Therapy: 17GJI7280 to (Evaluate:28Jun2017)  Requested for: 29OOM5492; Last   Rx:[id number] Ordered   4  Wellbutrin  MG Oral Tablet Extended Release 12 Hour (BuPROPion HCl ER   (SR)); Take 1 tablet daily Recorded   5  Xylocaine Jelly 2 % GEL (Lidocaine HCl); Apply prn to wound(s) prior to debridement for   pain control; Therapy: (UCIXVXJW:92JET0059) to Recorded   6  Zolpidem Tartrate 10 MG Oral Tablet; Therapy: 67GWB5232 to Recorded    Allergies    1  Penicillins    Future Appointments    Date/Time Provider Specialty Site   02/22/2017 01:10 PM HAMIDA Diaz  Orthopedic Surgery [facility] [address]   01/03/2018 07:45 AM Ju [facility]o Endocrinology [facility] ENDOCRINOLOGY formerly [facility]   02/28/2017 02:00 PM HAMIDA Baltazar  Psychiatry [facility]   03/22/2017 10:40 AM HAMIDA Pritchett  Cardiology [address]     Signatures   Electronically signed by : BRYANT Mcconnell; Feb 15 2017 12:44PM EST                       (Author)    Electronically signed by :  Julius Ochoa HAMIDA Tena ; Mar  7 2017 10:41AM EST

## 2018-01-18 NOTE — MISCELLANEOUS
Message   Recorded as Task   Date: 03/30/2017 03:49 PM, Created By: Allie Motta   Task Name: Medical Complaint Callback   Assigned To: Aissatou Mittal   Regarding Patient: Carlos Paiz, Status: Active   Comment:    Eleni Small - 30 Mar 2017 3:49 PM     TASK CREATED  Caller: Self; [phone] (Home)  WHEN AB SAW YOU, [address] [place] [address] HASN'T SLEPT FOR AWHILE  HE WOULD LIKE YOU TO GIVE HIM A CALL BACK  [facility]#[phone]   Spoke with patient - has not been sleeping well on Trazodone 200 mg     Plan: add Melatonin 3 mg to 6 mg at bedtime as needed (patient will buy over the counter)      Plan  Insomnia    · Melatonin 3 MG Oral Tablet; TAKE 1 OR 2 TABLETS AT BEDTIME AS NEEDED    Signatures   Electronically signed by : HAMIDA Albarran ; Mar 30 2017  5:04PM EST                       (Author)

## 2018-01-22 VITALS
HEIGHT: 69 IN | SYSTOLIC BLOOD PRESSURE: 131 MMHG | HEART RATE: 87 BPM | DIASTOLIC BLOOD PRESSURE: 85 MMHG | BODY MASS INDEX: 44.14 KG/M2 | WEIGHT: 298 LBS

## 2018-01-22 VITALS
DIASTOLIC BLOOD PRESSURE: 82 MMHG | HEART RATE: 84 BPM | BODY MASS INDEX: 43.65 KG/M2 | WEIGHT: 288 LBS | HEIGHT: 68 IN | TEMPERATURE: 98.4 F | SYSTOLIC BLOOD PRESSURE: 128 MMHG

## 2018-01-22 VITALS
BODY MASS INDEX: 47.48 KG/M2 | HEART RATE: 81 BPM | SYSTOLIC BLOOD PRESSURE: 131 MMHG | DIASTOLIC BLOOD PRESSURE: 82 MMHG | WEIGHT: 285 LBS | HEIGHT: 65 IN

## 2018-02-26 DIAGNOSIS — G47.09 OTHER INSOMNIA: Primary | ICD-10-CM

## 2018-02-26 PROBLEM — E66.01 MORBID OBESITY (HCC): Status: ACTIVE | Noted: 2017-02-15

## 2018-02-26 PROBLEM — F41.1 GAD (GENERALIZED ANXIETY DISORDER): Status: ACTIVE | Noted: 2017-02-28

## 2018-02-26 PROBLEM — F33.2 MAJOR DEPRESSIVE DISORDER, RECURRENT, SEVERE WITHOUT PSYCHOTIC BEHAVIOR (HCC): Status: ACTIVE | Noted: 2017-02-28

## 2018-02-26 PROBLEM — B35.0 TINEA CAPITIS: Status: ACTIVE | Noted: 2017-01-04

## 2018-02-26 PROBLEM — M79.18 MYOFASCIAL PAIN SYNDROME: Status: ACTIVE | Noted: 2017-02-13

## 2018-02-26 PROBLEM — G58.8 INTERCOSTAL NEURALGIA: Status: ACTIVE | Noted: 2017-05-22

## 2018-02-26 PROBLEM — G56.10 MEDIAN NERVE NEURITIS: Status: ACTIVE | Noted: 2017-05-09

## 2018-02-26 PROBLEM — M18.9 CMC ARTHRITIS, THUMB, DEGENERATIVE: Status: ACTIVE | Noted: 2017-01-11

## 2018-02-26 RX ORDER — BUPROPION HYDROCHLORIDE 150 MG/1
3 TABLET ORAL DAILY
COMMUNITY
Start: 2017-02-28 | End: 2018-05-03 | Stop reason: SDUPTHER

## 2018-02-26 RX ORDER — ARIPIPRAZOLE 2 MG/1
1 TABLET ORAL
COMMUNITY
Start: 2017-08-30 | End: 2018-05-03 | Stop reason: SDUPTHER

## 2018-02-26 RX ORDER — ZOLPIDEM TARTRATE 10 MG/1
10 TABLET ORAL
Qty: 30 TABLET | Refills: 0 | Status: SHIPPED | OUTPATIENT
Start: 2018-02-26 | End: 2018-03-28 | Stop reason: SDUPTHER

## 2018-02-26 RX ORDER — ESCITALOPRAM OXALATE 20 MG/1
1 TABLET ORAL DAILY
COMMUNITY
Start: 2017-02-28 | End: 2018-05-03 | Stop reason: SDUPTHER

## 2018-02-26 NOTE — TELEPHONE ENCOUNTER
Prescription for zolpidem called to prescriber voice mail at [facility]  Reviewed with Daysi Mendez

## 2018-03-26 ENCOUNTER — TELEPHONE (OUTPATIENT)
Dept: BEHAVIORAL/MENTAL HEALTH CLINIC | Facility: CLINIC | Age: 53
End: 2018-03-26

## 2018-03-28 DIAGNOSIS — G47.09 OTHER INSOMNIA: ICD-10-CM

## 2018-03-28 DIAGNOSIS — F41.1 GAD (GENERALIZED ANXIETY DISORDER): Primary | ICD-10-CM

## 2018-03-28 RX ORDER — ZOLPIDEM TARTRATE 10 MG/1
10 TABLET ORAL
Qty: 30 TABLET | Refills: 0 | Status: SHIPPED | OUTPATIENT
Start: 2018-03-28 | End: 2018-05-01 | Stop reason: SDUPTHER

## 2018-03-29 NOTE — TELEPHONE ENCOUNTER
Pt called/informed him rx was called into pharmacy today by nurse/homestar [facility]

## 2018-03-29 NOTE — TELEPHONE ENCOUNTER
Prescription for Zolpidem to [facility] and patient was notified via voicemail  This was for March refill

## 2018-03-29 NOTE — TELEPHONE ENCOUNTER
Prescription for Zolpidem to [facility] and patient was notified via voicemail  This was for March refill

## 2018-04-29 PROBLEM — G47.09 OTHER INSOMNIA: Chronic | Status: ACTIVE | Noted: 2017-04-11

## 2018-04-29 PROBLEM — F41.1 GAD (GENERALIZED ANXIETY DISORDER): Chronic | Status: ACTIVE | Noted: 2017-02-28

## 2018-04-29 PROBLEM — F33.2 MAJOR DEPRESSIVE DISORDER, RECURRENT, SEVERE WITHOUT PSYCHOTIC BEHAVIOR (HCC): Chronic | Status: ACTIVE | Noted: 2017-02-28

## 2018-04-29 NOTE — PSYCH
MEDICATION MANAGEMENT NOTE        [facility]      Name and Date of Birth:  Aisha Figueroa 48 y o  [birth year] MRN: [id number]    Date of Visit: May 3, 2018    SUBJECTIVE:    Kae Mathews continues to experience ongoing symptoms since the last visit  He still feels depressed on and off and becomes easily frustrated with life stressors  He is upset that his wrongful termination case he had from past job was dismissed  Started working part time delivering groceries and sometimes feels stressed out at work as well  He had an allergic reaction recently to oak tree in March and then did not react well to Prednisone course that made him more boucher  He also tried to get involved in a relationship and that person did not want to continue relationship once she found out about his psychiatric issues  He reports on and off anxiety symptoms as well  He lost weight intentionally  Glad that he was recently approved for disability    He denies any suicidal ideation, intent or plan at present; denies any homicidal ideation, intent or plan at present  He has no auditory hallucinations, denies any visual hallucinations, no overt delusions noted  He denies any side effects from current psychiatric medications  PHQ-9 is 11 today (slightly decreased from 12 at the last visit)  Manuel Us HPI ROS Appetite Changes and Sleep: normal sleep, normal appetite, recent weight loss (28 lbs), low energy    Review Of Systems:      Constitutional low energy and recent weight loss (28 lbs)   ENT negative   Cardiovascular negative   Respiratory negative   Gastrointestinal negative   Genitourinary negative   Musculoskeletal knee pain   Integumentary negative   Neurological negative   Endocrine negative   Other Symptoms none       Past Psychiatric History:     Past Inpatient Psychiatric Treatment:   No history of past inpatient psychiatric admissions    Past Outpatient Psychiatric Treatment:    In outpatient treatment at [facility] [address] since 2/2017    Past Suicide Attempts: no  Past Violent Behavior: no  Past Psychiatric Medication Trials: Lexapro, Wellbutrin XL, Trazodone, Abilify, Ambien and Melatonin    Traumatic History:     Abuse: no history of sexual abuse, no history of physical abuse  Other Traumatic Events: none     Past Medical History:    Past Medical History:   Diagnosis Date    Adrenal adenoma     Arthritis     Carpal tunnel syndrome     Chronic back pain     Chronic pain disorder     Cubital tunnel syndrome     Folliculitis     Hemorrhoid     Liver lesion     Lumbago     Lumbago     Morbid obesity (HCC)     Myofascial pain syndrome     Scalp lesion     Sleep apnea     CPAP dependance    Tinea capitis     Ulnar 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     Umbilical hernia     Vitamin D deficiency      Past Medical History Pertinent Negatives:   Diagnosis Date Noted    Head injury     Seizures (HCC)      Allergies   Allergen Reactions    Penicillins Rash       Substance Abuse History:    History   Alcohol Use    Yes     Comment: Social alcohol use     History   Drug Use No       Social History:    Social History     Social History    Marital status: /Civil Union     Spouse name: N/A    Number of children: 1    Years of education: some college     Occupational History    on disability      Social History Main Topics    Smoking status: Never Smoker    Smokeless tobacco: Never Used    Alcohol use Yes      Comment: Social alcohol use    Drug use: No    Sexual activity: No     Other Topics Concern    Not on file     Social History Narrative    Education: some college    Learning Disabilities: none    Marital History:     Children: 1 adult son    Living Arrangement: lives in home with parents    Occupational History: on disability, also works part time delivering groceries, 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    Functioning Relationships: good support system    Legal History: none     History: None       Family Psychiatric History:     Family History   Problem Relation Age of Onset    Depression Sister     Suicide Attempts Sister        History Review: The following portions of the patient's history were reviewed and updated as appropriate: allergies, current medications, past family history, past medical history, past social history, past surgical history and problem list          OBJECTIVE:     Vital signs in last 24 hours:    Vitals:    05/03/18 1357   BP: 122/76   Cuff Size: Large   Pulse: 89   Weight: 116 kg (255 lb)   Height: 5' 10" (1 778 m)       Mental Status Evaluation:    Appearance age appropriate, casually dressed   Behavior cooperative, psychomotor retardation   Speech normal rate, soft, decreased volume   Mood depressed, anxious   Affect constricted   Thought Processes organized, goal directed   Associations intact associations   Thought Content no overt delusions   Perceptual Disturbances: no auditory hallucinations, no visual hallucinations   Abnormal Thoughts  Risk Potential Suicidal ideation - None  Homicidal ideation - None  Potential for aggression - No   Orientation oriented to person, place, time/date and situation   Memory recent and remote memory grossly intact   Consciousness alert and awake   Attention Span Concentration Span decreased attention span  decreased concentration   Intellect appears to be of average intelligence   Insight intact   Judgement intact   Muscle Strength and  Gait muscle strength and tone were normal, normal gait , normal balance   Motor activity no abnormal movements   Language no difficulty naming common objects, no difficulty repeating a phrase, no difficulty writing a sentence   Fund of Knowledge adequate knowledge of current events  adequate fund of knowledge regarding past history  adequate fund of knowledge regarding vocabulary    Pain moderate   Pain Scale 5       Laboratory Results:  I have personally reviewed all pertinent laboratory/tests results  Most Recent Labs:   Lab Results   Component Value Date    WBC 7 24 08/23/2017    RBC 4 81 08/23/2017    HGB 14 0 08/23/2017    HCT 41 9 08/23/2017     08/23/2017    RDW 13 4 08/23/2017    NEUTROABS 3 87 05/22/2017     08/23/2017    K 4 5 08/23/2017     08/23/2017    CO2 28 08/23/2017    BUN 19 08/23/2017    CREATININE 1 15 08/23/2017    GLUCOSE 120 12/29/2016    CALCIUM 8 5 08/23/2017    AST 27 08/23/2017    ALT 31 08/23/2017    ALKPHOS 68 08/23/2017    PROT 7 0 08/23/2017    BILITOT 0 53 08/23/2017    CHOL 157 08/23/2017    HDL 27 (L) 08/23/2017    TRIG 174 (H) 08/23/2017    LDLCALC 95 08/23/2017    VYT0HPUPHJWV 1 905 08/23/2017       Assessment/Plan:       Diagnoses and all orders for this visit:    Major depressive disorder, recurrent, severe without psychotic features ([facility] [address] )  -     ARIPiprazole (ABILIFY) 5 mg tablet; Take 1 tablet (5 mg total) by mouth daily at bedtime for 180 days  -     escitalopram (LEXAPRO) 20 mg tablet; Take 1 tablet (20 mg total) by mouth daily for 180 days  -     buPROPion (WELLBUTRIN XL) 150 mg 24 hr tablet; Take 3 tablets (450 mg total) by mouth daily for 180 days    Other insomnia  -     zolpidem (AMBIEN) 10 mg tablet; Take 1 tablet (10 mg total) by mouth daily at bedtime as needed for sleep for up to 90 days To be filled on or after 7/30/18    ALYSHA (generalized anxiety disorder)    Other orders  -     gabapentin (NEURONTIN) 300 mg capsule;  Take 1 capsule by mouth 2 (two) times a day        Treatment Recommendations/Precautions:    Continue Wellbutrin  mg daily to improve depressive symptoms  Continue Lexapro 20 mg daily  Increase Abilify to 5 mg at bedtime to augment antidepressant  Continue Ambien 10 mg at bedtime PRN to help with insomnia  Medication management every 2 months  Continue psychotherapy with own therapist   Follows with family physician for glucose and lipid monitoring due to current therapy with antipsychotic medication  Follows with family physician for medical issues    Risks/Benefits      Risks, Benefits And Possible Side Effects Of Medications:    Risks, benefits, and possible side effects of medications explained to Dilma including risk of parkinsonian symptoms, Tardive Dyskinesia and metabolic syndrome related to treatment with antipsychotic medications and risk of suicidality related to treatment with antidepressants  He verbalizes understanding and agreement for treatment  Controlled Medication Discussion:     Dilma has been filling controlled prescriptions on time as prescribed according to [facility] program      Psychotherapy Provided:     Individual psychotherapy provided: Yes  Counseling was provided during the session today for 20 minutes  Medications, treatment progress and treatment plan reviewed with Dilma  Medication changes discussed with Dilma  Recent stressor including relationship problems, job stress and health issues discussed with Dilma  Discussed with Dilma coping with break up with girlfriend and medical problems  Coping strategies including acquiring relapse prevention skills, reducing time spent worrying and healthy living habits reviewed with Dilma  Supportive therapy provided

## 2018-05-01 ENCOUNTER — TELEPHONE (OUTPATIENT)
Dept: PSYCHIATRY | Facility: CLINIC | Age: 53
End: 2018-05-01

## 2018-05-01 DIAGNOSIS — G47.09 OTHER INSOMNIA: ICD-10-CM

## 2018-05-01 RX ORDER — ZOLPIDEM TARTRATE 10 MG/1
10 TABLET ORAL
Qty: 90 TABLET | Refills: 0 | OUTPATIENT
Start: 2018-05-01 | End: 2018-05-03 | Stop reason: SDUPTHER

## 2018-05-01 NOTE — TELEPHONE ENCOUNTER
Sharyle Donovan has an appointment 5/3/18, he called this evening to request a refill of Zolpidem 10mg   He ran out today, does not have enough to get through until his upcoming appointment

## 2018-05-03 ENCOUNTER — OFFICE VISIT (OUTPATIENT)
Dept: PSYCHIATRY | Facility: CLINIC | Age: 53
End: 2018-05-03
Payer: COMMERCIAL

## 2018-05-03 VITALS
HEART RATE: 89 BPM | BODY MASS INDEX: 36.51 KG/M2 | WEIGHT: 255 LBS | SYSTOLIC BLOOD PRESSURE: 122 MMHG | DIASTOLIC BLOOD PRESSURE: 76 MMHG | HEIGHT: 70 IN

## 2018-05-03 DIAGNOSIS — G47.09 OTHER INSOMNIA: ICD-10-CM

## 2018-05-03 DIAGNOSIS — F33.2 MAJOR DEPRESSIVE DISORDER, RECURRENT, SEVERE WITHOUT PSYCHOTIC FEATURES (HCC): Primary | Chronic | ICD-10-CM

## 2018-05-03 DIAGNOSIS — F41.1 GAD (GENERALIZED ANXIETY DISORDER): Chronic | ICD-10-CM

## 2018-05-03 PROCEDURE — 90833 PSYTX W PT W E/M 30 MIN: CPT | Performed by: PSYCHIATRY & NEUROLOGY

## 2018-05-03 PROCEDURE — 99213 OFFICE O/P EST LOW 20 MIN: CPT | Performed by: PSYCHIATRY & NEUROLOGY

## 2018-05-03 RX ORDER — ARIPIPRAZOLE 5 MG/1
5 TABLET ORAL
Qty: 90 TABLET | Refills: 1 | Status: SHIPPED | OUTPATIENT
Start: 2018-05-03 | End: 2018-09-13 | Stop reason: SDUPTHER

## 2018-05-03 RX ORDER — GABAPENTIN 300 MG/1
1 CAPSULE ORAL 2 TIMES DAILY
COMMUNITY
Start: 2017-09-14 | End: 2018-09-13 | Stop reason: ALTCHOICE

## 2018-05-03 RX ORDER — ESCITALOPRAM OXALATE 20 MG/1
20 TABLET ORAL DAILY
Qty: 90 TABLET | Refills: 1 | Status: SHIPPED | OUTPATIENT
Start: 2018-05-03 | End: 2018-09-13 | Stop reason: SDUPTHER

## 2018-05-03 RX ORDER — BUPROPION HYDROCHLORIDE 150 MG/1
450 TABLET ORAL DAILY
Qty: 270 TABLET | Refills: 1 | Status: SHIPPED | OUTPATIENT
Start: 2018-05-03 | End: 2018-09-13 | Stop reason: SDUPTHER

## 2018-05-03 RX ORDER — ZOLPIDEM TARTRATE 10 MG/1
10 TABLET ORAL
Qty: 90 TABLET | Refills: 0 | Status: SHIPPED | OUTPATIENT
Start: 2018-07-30 | End: 2018-07-30 | Stop reason: SDUPTHER

## 2018-05-03 NOTE — PSYCH
TREATMENT PLAN (Medication Management Only)        [facility]    Name/Date of Birth/MRN:  Bel Leon 48 y o  [birth year] MRN: [id number]  Date of Treatment Plan: May 3, 2018  Diagnosis/Diagnoses:   1  Major depressive disorder, recurrent, severe without psychotic features ([facility] [address] )    2  Other insomnia    3  ALYSHA (generalized anxiety disorder)      Strengths/Personal Resources for Self-Care: supportive family, taking medications as prescribed  Area/Areas of need (in own words): "maintain structured schedule, sleep, exercise, medications"  1  Long Term Goal: improve control of depression  Target Date: 2 months - 7/3/2018  Person/Persons responsible for completion of goal: Lynda Coburn  2  Short Term Objective (s) - How will we reach this goal?:   A  Provider new recommended medication/dosage changes and/or continue medication(s): increase Abilify, continue all other medications (Lexapro, Wellbutrin XL and Ambien)  B   N/A   C   N/A  Target Date: 2 months - 7/3/2018  Person/Persons Responsible for Completion of Goal: Lynda Coburn   Progress Towards Goals: progressing slowly  Treatment Modality: medication management every 2 months, continue psychotherapy with own therapist  Review due 90 to 120 days from date of this plan: 4 months - 9/3/2018  Expected length of service: ongoing treatment  My Physician/PA/NP and I have developed this plan together and I agree to work on the goals and objectives  I understand the treatment goals that were developed for my treatment    Signature:       Date and time:  Signature of parent/guardian if under age of 15 years: Date and time:  Signature of provider:      Date and time:  Signature of Supervising Physician:    Date and time: 5/3/2018      Vanessa Tucker MD

## 2018-07-25 DIAGNOSIS — G47.09 OTHER INSOMNIA: ICD-10-CM

## 2018-07-25 RX ORDER — ZOLPIDEM TARTRATE 10 MG/1
10 TABLET ORAL
Qty: 90 TABLET | Refills: 0 | OUTPATIENT
Start: 2018-07-30 | End: 2018-10-28

## 2018-07-27 RX ORDER — ZOLPIDEM TARTRATE 10 MG/1
10 TABLET ORAL
Qty: 90 TABLET | Refills: 0 | OUTPATIENT
Start: 2018-07-30 | End: 2018-10-28

## 2018-07-27 NOTE — TELEPHONE ENCOUNTER
He was given printer Rx on 5/3/18 for 90 days, no RF to be filled on 7/30/18  He needs to take that Rx to the pharmacy  Not due yet for refill

## 2018-07-30 DIAGNOSIS — G47.09 OTHER INSOMNIA: ICD-10-CM

## 2018-07-30 RX ORDER — ZOLPIDEM TARTRATE 10 MG/1
10 TABLET ORAL
Qty: 90 TABLET | Refills: 0 | Status: SHIPPED | OUTPATIENT
Start: 2018-07-30 | End: 2018-09-13 | Stop reason: SDUPTHER

## 2018-07-30 NOTE — TELEPHONE ENCOUNTER
Received a message that Mcahelle Chakraborty was at [facility] and there was no prescription for him  As per Dr Baugh Lines was given a printed prescription at office visit on on 05/03 to be filled 07/30 and he should find that script  Will clarify if Machelle Chakraborty was contacted about printed prescription

## 2018-07-30 NOTE — TELEPHONE ENCOUNTER
Day Kearney sees dr Rona Dahl, lost his may script that was printed for [facility] 7/30/18  Its for zolpidem 10mg   He would like a printed script

## 2018-09-10 NOTE — PSYCH
MEDICATION MANAGEMENT NOTE        [address]      Name and Date of Birth:  Patrick Barroso 48 y o  [birth year] MRN: [id number]    Date of Visit: September 13, 2018    SUBJECTIVE:    Odette Sands continues to experience on and off symptoms since the last visit  He continues to feel depressed, upset that he again started relationship and it did not developed well  He is no longer in that relationship, felt ex-girlfriend did not understand him well and did not understand his depression "she believed I was holding on to the past too much"  He continues to report on and off anxiety symptoms 'I worry all the time"    He reports occasional passive death wish, but denies current active suicidal ideation, intent or plan; denies any homicidal ideation, intent or plan at present  He has no auditory hallucinations, denies any visual hallucinations, has no overt delusions noted  He denies any side effects from current psychiatric medications  PHQ-9 is 15 today (increased from 11 at the last visit)  Melva Cameron HPI ROS Appetite Changes and Sleep: fluctuating sleep pattern, decrease in number of sleep hours (5 hours), normal appetite, recent weight gain (10 lbs), low energy    Review Of Systems:      Constitutional low energy and recent weight gain (10 lbs)   ENT negative   Cardiovascular negative   Respiratory negative   Gastrointestinal negative   Genitourinary negative   Musculoskeletal negative   Integumentary negative   Neurological negative   Endocrine negative   Other Symptoms none       Past Psychiatric History:      Past Inpatient Psychiatric Treatment:   No history of past inpatient psychiatric admissions  Past Outpatient Psychiatric Treatment:    In outpatient treatment at [address] [place] [address] [address] since 2/2017    Past Suicide Attempts: no  Past Violent Behavior: no  Past Psychiatric Medication Trials: Lexapro, Wellbutrin XL, Trazodone, Abilify, Ambien and Melatonin     Traumatic History:      Abuse: no history of sexual abuse, no history of physical abuse  Other Traumatic Events: none     Past Medical History:    Past Medical History:   Diagnosis Date    Adrenal adenoma     Arthritis     Carpal tunnel syndrome     Chronic back pain     Chronic pain disorder     Cubital tunnel syndrome     Folliculitis     Hemorrhoid     Liver lesion     Lumbago     Lumbago     Morbid obesity (HCC)     Myofascial pain syndrome     Scalp lesion     Sleep apnea     CPAP dependance    Tinea capitis     Ulnar 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     Umbilical hernia     Vitamin D deficiency      Past Medical History Pertinent Negatives:   Diagnosis Date Noted    Head injury     Seizures (HCC)      Allergies   Allergen Reactions    Penicillins Rash       Substance Abuse History:    History   Alcohol Use    Yes     Comment: Social alcohol use     History   Drug Use No       Social History:    Social History     Social History    Marital status: /Civil Union     Spouse name: N/A    Number of children: 1    Years of education: some college     Occupational History    on disability      Social History Main Topics    Smoking status: Never Smoker    Smokeless tobacco: Never Used    Alcohol use Yes      Comment: Social alcohol use    Drug use: No    Sexual activity: No     Other Topics Concern    Not on file     Social History Narrative    Education: some college    Learning Disabilities: none    Marital History:     Children: 1 adult son    Living Arrangement: lives in home with parents    Occupational History: on disability, also works part time delivering groceries, 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    Functioning Relationships: good support system    Legal History: none     History: None       Family Psychiatric History:     Family History   Problem Relation Age of Onset    Depression Sister     Suicide Attempts Sister History Review: The following portions of the patient's history were reviewed and updated as appropriate: allergies, current medications, past family history, past medical history, past social history, past surgical history and problem list          OBJECTIVE:     Vital signs in last 24 hours:    Vitals:    09/13/18 1506   BP: 128/84   Pulse: 87   Weight: 120 kg (265 lb)   Height: 5' 9" (1 753 m)       Mental Status Evaluation:    Appearance age appropriate, casually dressed   Behavior cooperative   Speech normal rate, soft, decreased volume   Mood depressed, anxious   Affect blunted   Thought Processes organized, goal directed   Associations intact associations   Thought Content no overt delusions   Perceptual Disturbances: no auditory hallucinations, no visual hallucinations   Abnormal Thoughts  Risk Potential Suicidal ideation - None at present  Homicidal ideation - None  Potential for aggression - No   Orientation oriented to person, place, time/date and situation   Memory recent and remote memory grossly intact   Consciousness alert and awake   Attention Span Concentration Span attention span and concentration appear shorter than expected for age   Intellect appears to be of average intelligence   Insight intact   Judgement intact   Muscle Strength and  Gait muscle strength and tone were normal, normal gait , normal balance   Motor activity no abnormal movements   Language no difficulty naming common objects, no difficulty repeating a phrase, no difficulty writing a sentence   Fund of Knowledge adequate knowledge of current events  adequate fund of knowledge regarding past history  adequate fund of knowledge regarding vocabulary    Pain none   Pain Scale 0       Laboratory Results:  I have personally reviewed all pertinent laboratory/tests results  Recent Labs (last 4 months):   No visits with results within 4 Month(s) from this visit     Latest known visit with results is:   Appointment on 08/30/2017 Component Date Value    Hemoglobin A1C 08/30/2017 5 6     EAG 08/30/2017 114        Assessment/Plan:       Diagnoses and all orders for this visit:    Major depressive disorder, recurrent, severe without psychotic features (HCC)  -     ARIPiprazole (ABILIFY) 10 mg tablet; Take 1 tablet (10 mg total) by mouth daily at bedtime for 180 days  -     buPROPion (WELLBUTRIN XL) 150 mg 24 hr tablet; Take 3 tablets (450 mg total) by mouth daily for 180 days  -     escitalopram (LEXAPRO) 20 mg tablet; Take 1 tablet (20 mg total) by mouth daily for 180 days    ALYSHA (generalized anxiety disorder)  -     busPIRone (BUSPAR) 5 mg tablet; Take 1 tablet (5 mg total) by mouth 2 (two) times a day for 180 days    Other insomnia  -     zolpidem (AMBIEN) 10 mg tablet; Take 1 tablet (10 mg total) by mouth daily at bedtime as needed for sleep for up to 180 days To be filled on or after 10/28/18        Treatment Recommendations/Precautions:    Continue Wellbutrin  mg daily to improve depressive symptoms  Continue Lexapro 20 mg daily  Add Buspar 5 mg bid to help with anxiety  Increase Abilify to 10 mg at bedtime to augment antidepressant  Continue Ambien 10 mg at bedtime PRN to help with insomnia  Medication management every 2 months  Continue psychotherapy with own therapist  Follows with family physician for glucose and lipid monitoring due to current therapy with antipsychotic medication  Follows with family physician for medical issues  Referral to Partial Hospitalization Program    Risks/Benefits      Risks, Benefits And Possible Side Effects Of Medications:    Risks, benefits, and possible side effects of medications explained to Marina Camarillo including risk of parkinsonian symptoms, Tardive Dyskinesia and metabolic syndrome related to treatment with antipsychotic medications and risk of suicidality related to treatment with antidepressants  He verbalizes understanding and agreement for treatment      Controlled Medication Discussion: Basim Arnold has been filling controlled prescriptions on time as prescribed according to [facility] program      Psychotherapy Provided:     Individual psychotherapy provided: Yes  Counseling was provided during the session today for 20 minutes  Medications, treatment progress and treatment plan reviewed with Basim Arnold  Medication changes discussed with Basim Arnold  Goals discussed during in session: alleviate anxiety and improve control of depression  Discussed with Basim Arnold coping with relationship problems  Coping mechanisms including exercising and taking walks reviewed with Basim Arnold  Supportive therapy provided        Treatment Plan;    Completed and signed during the session: Yes - with Andres Islas MD 09/13/18

## 2018-09-13 ENCOUNTER — OFFICE VISIT (OUTPATIENT)
Dept: PSYCHIATRY | Facility: CLINIC | Age: 53
End: 2018-09-13
Payer: COMMERCIAL

## 2018-09-13 VITALS
DIASTOLIC BLOOD PRESSURE: 84 MMHG | BODY MASS INDEX: 39.25 KG/M2 | HEIGHT: 69 IN | WEIGHT: 265 LBS | SYSTOLIC BLOOD PRESSURE: 128 MMHG | HEART RATE: 87 BPM

## 2018-09-13 DIAGNOSIS — G47.09 OTHER INSOMNIA: Chronic | ICD-10-CM

## 2018-09-13 DIAGNOSIS — F41.1 GAD (GENERALIZED ANXIETY DISORDER): Chronic | ICD-10-CM

## 2018-09-13 DIAGNOSIS — F33.2 MAJOR DEPRESSIVE DISORDER, RECURRENT, SEVERE WITHOUT PSYCHOTIC FEATURES (HCC): Primary | Chronic | ICD-10-CM

## 2018-09-13 PROCEDURE — 99213 OFFICE O/P EST LOW 20 MIN: CPT | Performed by: PSYCHIATRY & NEUROLOGY

## 2018-09-13 PROCEDURE — 90833 PSYTX W PT W E/M 30 MIN: CPT | Performed by: PSYCHIATRY & NEUROLOGY

## 2018-09-13 RX ORDER — ZOLPIDEM TARTRATE 10 MG/1
10 TABLET ORAL
Qty: 90 TABLET | Refills: 1 | Status: SHIPPED | OUTPATIENT
Start: 2018-10-28 | End: 2019-02-01 | Stop reason: SDUPTHER

## 2018-09-13 RX ORDER — ESCITALOPRAM OXALATE 20 MG/1
20 TABLET ORAL DAILY
Qty: 90 TABLET | Refills: 1 | Status: SHIPPED | OUTPATIENT
Start: 2018-09-13 | End: 2019-02-01 | Stop reason: SDUPTHER

## 2018-09-13 RX ORDER — BUPROPION HYDROCHLORIDE 150 MG/1
450 TABLET ORAL DAILY
Qty: 270 TABLET | Refills: 0 | Status: SHIPPED | OUTPATIENT
Start: 2018-09-13 | End: 2019-02-01 | Stop reason: SDUPTHER

## 2018-09-13 RX ORDER — ARIPIPRAZOLE 10 MG/1
10 TABLET ORAL
Qty: 90 TABLET | Refills: 1 | Status: SHIPPED | OUTPATIENT
Start: 2018-09-13 | End: 2019-02-01 | Stop reason: SDUPTHER

## 2018-09-13 RX ORDER — BUSPIRONE HYDROCHLORIDE 5 MG/1
5 TABLET ORAL 2 TIMES DAILY
Qty: 180 TABLET | Refills: 1 | Status: SHIPPED | OUTPATIENT
Start: 2018-09-13 | End: 2019-02-01 | Stop reason: SDUPTHER

## 2018-09-13 NOTE — PSYCH
TREATMENT PLAN (Medication Management Only)        [facility]    Name/Date of Birth/MRN:  Marleny Villegas 48 y o  [birth year] MRN: [id number]  Date of Treatment Plan: September 13, 2018  Diagnosis/Diagnoses:   1  Major depressive disorder, recurrent, severe without psychotic features ([facility] [address] )    2  ALYSHA (generalized anxiety disorder)    3  Other insomnia      Strengths/Personal Resources for Self-Care: "desire to feel normal"  Area/Areas of need (in own words): "seeing hope/happy future"  1  Long Term Goal: improve control of depression  Target Date: 2 months - 11/13/2018  Person/Persons responsible for completion of goal: Marina Camarillo  2  Short Term Objective (s) - How will we reach this goal?:   A  Provider new recommended medication/dosage changes and/or continue medication(s): increase Abilify, add Buspar, continue all other medications (Lexapro, Wellbutrin XL and Ambien)  B   N/A   C   N/A  Target Date: 2 months - 11/13/2018  Person/Persons Responsible for Completion of Goal: Marina Camarillo   Progress Towards Goals: minimal progress  Treatment Modality: medication management every 2 months, continue psychotherapy with own therapist, referral to Partial Hospitalization Program  Review due 90 to 120 days from date of this plan: 4 months - 1/13/2019  Expected length of service: ongoing treatment  My Physician/PA/NP and I have developed this plan together and I agree to work on the goals and objectives  I understand the treatment goals that were developed for my treatment    Signature:       Date and time:  Signature of parent/guardian if under age of 15 years: Date and time:  Signature of provider:      Date and time:  Signature of Supervising Physician:    Date and time: 9/13/2018      Sujey Armendariz MD

## 2018-09-21 ENCOUNTER — DOCUMENTATION (OUTPATIENT)
Dept: PSYCHIATRY | Facility: CLINIC | Age: 53
End: 2018-09-21

## 2018-09-21 NOTE — PROGRESS NOTES
Treatment Plan not completed within required time limits due to :   Follow up appointment scheduled over the 120 days from [address] [place] [address] Rd 5/3/2018

## 2018-10-26 DIAGNOSIS — G47.09 OTHER INSOMNIA: Chronic | ICD-10-CM

## 2018-10-26 RX ORDER — ZOLPIDEM TARTRATE 10 MG/1
10 TABLET ORAL
Qty: 90 TABLET | Refills: 0 | OUTPATIENT
Start: 2018-10-28 | End: 2019-04-26

## 2019-01-08 ENCOUNTER — DOCUMENTATION (OUTPATIENT)
Dept: PSYCHIATRY | Facility: CLINIC | Age: 54
End: 2019-01-08

## 2019-01-27 NOTE — PROGRESS NOTES
Discussion/Summary  Discussion Summary:   Assess: Pt here for monthly weigh in  Pt lost 7 pounds  Patient was able to go back to taking Ambien and is sleeping well  Patient is doing the following activity/exercise: He is unable to be active due to chronic pain  He has a f/u appointment with his pain specialist to address medication  Pt has accomplished the following goals: he is paying attention to what he eats and is more mindful he is eating smaller portions by eating off of a smaller plate he is drinking more water- at least 64 ounces per day Per 24 hour recall: B: Toast with 2 eggs L: sandwich or cottage cheese and fruit S: protein shake D: salmon or tuna packs and salad Ham ( leftover from [facility]) Drinks about 1/2 gallon of crystal lite in the evening  Nutrition Diagnosis: Obesity related to sedentary lifestyle and excess energy intake as evidenced by BMI Intervention: Reviewed work flow  Plans to attend support group in May  Encouraged completion of lesson plan number 3 and 4 in bariatric booklet  Pt was receptive and verbalized understanding  Pt will work on the following goals: Activity /exercise goal: he will increase activity slowly once pain is controlled he will try using the [employer] horace to food journal he will practice the 30/30 rule Monitoring/evaluation: Pt will lose 2 to 4 pounds by next appointment Follow up scheduled for : one month with LCSW and RD  Active Problems    1  Adrenal adenoma (227 0) (D35 00)   2  Bilateral carpal tunnel syndrome (354 0) (G56 03)   3  Carpal tunnel syndrome of right wrist (354 0) (G56 01)   4  Chronic right-sided thoracic back pain (724 1,338 29) (M54 6,G89 29)   5  CMC arthritis, thumb, degenerative (715 34) (M18 9)   6  Cubital tunnel syndrome (354 2) (G56 20)   7  Cubital tunnel syndrome of both upper extremities (354 2) (G56 23)   8  Elbow pain (719 42) (M25 529)   9  ALYSHA (generalized anxiety disorder) (300 02) (F41 1)   10   Long term current use of Pt seen for f/u wound care.  Dressings to abdomen remain clean and intact.  VAC unit functioning well at 125mm Hg; no leaks noted.  Silvercel dressing to right lateral abdomen also remains intact, no shadowing noted.  Dressing to left thigh graft donor site intact; shadowing noted; will continue to monitor.  No other skin related concerns at this time.  PUP standards in place and reinforced.  Wound care to f/u tomorrow.    15 minutes direct pt care and documentation.       therapeutic drug (V58 69) (Z79 899)   11  Lumbago (724 2) (M54 5)   12  Major depressive disorder, recurrent, severe without psychotic behavior (296 33) (F33 2)   13  Morbid obesity (278 01) (E66 01)   14  Myofascial pain syndrome (729 1) (M79 1)   15  Other insomnia (780 52) (G47 09)   16  Pain in both hands (729 5) (M79 641,M79 642)   17  Postoperative examination (V67 00) (Z09)   18  Postoperative wound dehiscence, initial encounter (998 32) (T81 31XA)   19  Postoperative wound dehiscence, subsequent encounter (V58 89,998 32) (T81 31XD)   20  Pre-operative clearance (V72 84) (Z01 818)   21  Sleep apnea (780 57) (G47 30)   22  Thoracic compression fracture (805 2) (S22 000A)   23  Tinea capitis (110 0) (B35 0)   24  Ulnar 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 of left upper extremity (354 2) (G56 22)   25  Umbilical hernia (899 3) (K42 9)   26  Vitamin D deficiency (268 9) (E55 9)    Past Medical History    1  History of Anxiety (300 00) (F41 9)   2  History of depression (V11 8) (Z86 59)   3  History of folliculitis (Q54 7) (X97 2)   4  Denied: History of head injury   5  History of Knee pain (719 46) (M25 569)   6  History of Liver lesion (573 8) (K76 9)   7  History of Neck pain (723 1) (M54 2)   8  History of Nodular adrenal cortex (255 8) (E27 8)   9  History of Pain, wrist joint (719 43) (M25 539)   10  Denied: History of Seizures   11  History of Skin lesion of scalp (709 9) (L98 9)   12  History of Status post hernia repair (V45 89) (Z98 890,Z87 19)    Surgical History    1  History of Excision Of Lesion Scalp   2  History of Eye Surgery   3  History of Hemorrhoidectomy   4  History of Hernia Repair   5  History of Knee Surgery   6  History of Neuroplasty Decompression Median Nerve At Carpal Tunnel   7  History of Umbilical Hernia Repair    Family History  Mother    1  Family history of malignant neoplasm (V16 9) (Z80 9)   2  Denied: FH: mental illness  Father    3  Family history of arthritis (V17 7) (Z82 61)   4   Family history of osteoporosis (V17 81) (Z82 62)   5  Denied: FH: mental illness  Sister    6  Family history of depression (V17 0) (Z81 8)   7  Family history of suicide attempt (V17 0) (Z81 8)  Maternal Grandmother    8  Family history of malignant neoplasm (V16 9) (Z80 9)  Maternal Grandfather    9  Family history of malignant neoplasm (V16 9) (Z80 9)  Family History    10  Family history of Arthritis (716 90) (M19 90)   11  Family history of Back problem   12  Family history of Cancer (199 1) (C80 1)    Social History    · Caffeine use (V49 89) (F15 90)   · Never a smoker   · No illicit drug use   · Social drinker    Current Meds   1  ARIPiprazole 2 MG Oral Tablet; TAKE 1 TABLET AT BEDTIME; Therapy: 70Vqi3675 to (Evaluate:09Aug2017)  Requested for: 11Apr2017; Last   Rx:11Apr2017 Ordered   2  BuPROPion HCl ER (XL) 150 MG Oral Tablet Extended Release 24 Hour; take 3 tablets   daily; Therapy: 04DQQ5405 to (Evaluate:09Aug2017)  Requested for: 11Apr2017; Last   Rx:11Apr2017 Ordered   3  Escitalopram Oxalate 20 MG Oral Tablet; TAKE 1 TABLET DAILY; Therapy: 49DAS7237 to (Evaluate:09Aug2017)  Requested for: 11Apr2017; Last   Rx:11Apr2017 Ordered   4  TiZANidine HCl - 4 MG Oral Tablet; TAKE 1 TABLET Twice daily PRN SPASM; Therapy: 07XMH0897 to (Chang Magaña)  Requested for: 65ZDN4785; Last   Rx:08Mar2017 Ordered   5  Vitamin D (Ergocalciferol) 36736 UNIT Oral Capsule; TAKE 1 CAPSULE WEEKLY; Therapy: 49HDZ2281 to (Evaluate:28Jun2017)  Requested for: 50KYW0090; Last   Rx:01Pdw3437 Ordered   6  Zolpidem Tartrate 10 MG Oral Tablet; TAKE 1 TABLET AT BEDTIME AS NEEDED; Therapy: 90FXY1289 to (Evaluate:09Aug2017); Last Rx:11Apr2017 Ordered    Allergies    1   Penicillins    Vitals  Signs   Recorded: 20Apr2017 03:04PM   Height: 5 ft 5 in  Weight: 295 lb 1 6 oz  BMI Calculated: 49 11  BSA Calculated: 2 33    Future Appointments    Date/Time Provider Specialty Site   01/03/2018 07:45 AM Fe Lima, [address] [facility]   05/26/2017 02:30 PM HAMIDA Carrillo  Psychiatry [address] [facility]   05/09/2017 02:30 PM HAMIDA Mullins   Orthopedic Surgery [facility] [place] [facility]   05/25/2017 01:15 PM TONO Mccormack Pain Management [facility]     Signatures   Electronically signed by : BRYANT Mcnulty; Apr 20 2017  3:08PM EST                       (Author)    Electronically signed by : HAMIDA Gifford ; Apr 20 2017  3:57PM EST                       (Validation)

## 2019-02-01 ENCOUNTER — OFFICE VISIT (OUTPATIENT)
Dept: PSYCHIATRY | Facility: CLINIC | Age: 54
End: 2019-02-01
Payer: MEDICARE

## 2019-02-01 VITALS
SYSTOLIC BLOOD PRESSURE: 132 MMHG | HEIGHT: 70 IN | HEART RATE: 85 BPM | BODY MASS INDEX: 43.09 KG/M2 | WEIGHT: 301 LBS | DIASTOLIC BLOOD PRESSURE: 81 MMHG

## 2019-02-01 DIAGNOSIS — G47.09 OTHER INSOMNIA: Chronic | ICD-10-CM

## 2019-02-01 DIAGNOSIS — F33.2 MAJOR DEPRESSIVE DISORDER, RECURRENT, SEVERE WITHOUT PSYCHOTIC FEATURES (HCC): Primary | Chronic | ICD-10-CM

## 2019-02-01 DIAGNOSIS — F41.1 GAD (GENERALIZED ANXIETY DISORDER): Chronic | ICD-10-CM

## 2019-02-01 PROCEDURE — 90833 PSYTX W PT W E/M 30 MIN: CPT | Performed by: PSYCHIATRY & NEUROLOGY

## 2019-02-01 PROCEDURE — 99213 OFFICE O/P EST LOW 20 MIN: CPT | Performed by: PSYCHIATRY & NEUROLOGY

## 2019-02-01 RX ORDER — ARIPIPRAZOLE 10 MG/1
10 TABLET ORAL
Qty: 90 TABLET | Refills: 1 | Status: SHIPPED | OUTPATIENT
Start: 2019-02-01 | End: 2019-05-20 | Stop reason: SDUPTHER

## 2019-02-01 RX ORDER — ESCITALOPRAM OXALATE 10 MG/1
TABLET ORAL
Qty: 21 TABLET | Refills: 0 | Status: SHIPPED | OUTPATIENT
Start: 2019-02-01 | End: 2019-05-20 | Stop reason: ALTCHOICE

## 2019-02-01 RX ORDER — ZOLPIDEM TARTRATE 10 MG/1
5 TABLET ORAL
Start: 2019-02-01 | End: 2019-05-20 | Stop reason: SDUPTHER

## 2019-02-01 RX ORDER — BUSPIRONE HYDROCHLORIDE 5 MG/1
5 TABLET ORAL 2 TIMES DAILY
Qty: 180 TABLET | Refills: 1 | Status: SHIPPED | OUTPATIENT
Start: 2019-02-01 | End: 2019-05-20 | Stop reason: SDUPTHER

## 2019-02-01 RX ORDER — BUPROPION HYDROCHLORIDE 150 MG/1
450 TABLET ORAL DAILY
Qty: 270 TABLET | Refills: 1 | Status: SHIPPED | OUTPATIENT
Start: 2019-02-01 | End: 2019-05-20 | Stop reason: SDUPTHER

## 2019-02-01 RX ORDER — DULOXETIN HYDROCHLORIDE 30 MG/1
30 CAPSULE, DELAYED RELEASE ORAL DAILY
Qty: 60 CAPSULE | Refills: 3 | Status: SHIPPED | OUTPATIENT
Start: 2019-02-01 | End: 2019-05-20 | Stop reason: SDUPTHER

## 2019-02-01 NOTE — PSYCH
TREATMENT PLAN (Medication Management Only)        [facility]    Name/Date of Birth/MRN:  Mendel Garre 48 y o  [birth year] MRN: [id number]  Date of Treatment Plan: February 1, 2019  Diagnosis/Diagnoses:   1  Major depressive disorder, recurrent, severe without psychotic features ([facility] [address] )    2  ALYSHA (generalized anxiety disorder)    3  Other insomnia      Strengths/Personal Resources for Self-Care: taking medications as prescribed  Area/Areas of need (in own words): "consistency"  1  Long Term Goal: improve control of depression  Target Date: 6 weeks - 3/15/2019  Person/Persons responsible for completion of goal: Miguel Bajwa  2  Short Term Objective (s) - How will we reach this goal?:   A  Provider new recommended medication/dosage changes and/or continue medication(s): decrease Ambien, start Cymbalta, taper off Lexapro, continue all other medications (Wellbutrin XL, Abilify and Buspar)  B   N/A   C   N/A  Target Date: 6 weeks - 3/15/2019  Person/Persons Responsible for Completion of Goal: Rivera Breeze   Progress Towards Goals: limited progress  Treatment Modality: medication management every 6 weeks, referral for individual psychotherapy  Review due 90 to 120 days from date of this plan: 4 months - 6/1/2019  Expected length of service: ongoing treatment  My Physician/PA/NP and I have developed this plan together and I agree to work on the goals and objectives  I understand the treatment goals that were developed for my treatment    Signature:       Date and time:  Signature of parent/guardian if under age of 15 years: Date and time:  Signature of provider:      Date and time:  Signature of Supervising Physician:    Date and time: 2/1/2019      Yuko Boyd MD

## 2019-02-01 NOTE — PSYCH
MEDICATION MANAGEMENT NOTE        Former[facility]      Name and Date of Birth:  Madison Hernandez 48 y o  [birth year] MRN: [id number]    Date of Visit: February 1, 2019    SUBJECTIVE:    Mitchell Turcios states that he continues to experience ongoing symptoms since the last visit  He still feels depressed and unmotivated, rates mood as 9 on a scale of 1 (best mood) to 10 (worst mood)  He has been isolating himself at home and sometimes cannot motivate himself to go to work  He has been frustrated with recent weight gain  He still continues to experience on and off anxiety symptoms 'it has been pretty high"  He denies any suicidal ideation, intent or plan at present; denies any homicidal ideation, intent or plan at present  He has no auditory hallucinations, denies any visual hallucinations, has no delusional thinking  He denies any side effects from current psychiatric medications  PHQ-9 is 18 today (increased from 15 at the last visit)  Dara Soulier HPI ROS Appetite Changes and Sleep:     He reports hypersomnia, increase in number of sleep hours (12 hours), increased appetite, recent weight gain (36 lbs), low energy    Review Of Systems:      Constitutional recent weight gain (36 lbs)   ENT negative   Cardiovascular negative   Respiratory negative   Gastrointestinal negative   Genitourinary negative   Musculoskeletal knee pain and ankle pain   Integumentary negative   Neurological negative   Endocrine negative   Other Symptoms none       Past Psychiatric History:      Past Inpatient Psychiatric Treatment:   No history of past inpatient psychiatric admissions  Past Outpatient Psychiatric Treatment:    In outpatient treatment at [address] [place] [address] [address] since 2/2017    Past Suicide Attempts: no  Past Violent Behavior: no  Past Psychiatric Medication Trials: Lexapro, Wellbutrin XL, Trazodone, Abilify, Ambien and Melatonin     Traumatic History:      Abuse: no history of sexual abuse, no history of physical abuse  Other Traumatic Events: none     Past Medical History:    Past Medical History:   Diagnosis Date    Adrenal adenoma     Arthritis     Carpal tunnel syndrome     Chronic back pain     Chronic pain disorder     Cubital tunnel syndrome     Folliculitis     Hemorrhoid     Liver lesion     Lumbago     Morbid obesity (HCC)     Myofascial pain syndrome     Scalp lesion     Sleep apnea     CPAP dependance    Tinea capitis     Ulnar 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     Umbilical hernia     Vitamin D deficiency      Past Medical History Pertinent Negatives:   Diagnosis Date Noted    Head injury     Seizures ([address] )      Past Surgical History:   Procedure Laterality Date    CARPAL TUNNEL RELEASE Bilateral     EYE MUSCLE SURGERY      as a child for amblyopia    KNEE ARTHROSCOPY      PATELLA SURGERY Right     scraped out scar tissue per pt     VA COLONOSCOPY FLX DX W/COLLJ SPEC WHEN PFRMD N/A 2/10/2016    Procedure: COLONOSCOPY;  Surgeon: Bao Barnes MD;  [facility]; Service: Colorectal    VA EGD TRANSORAL BIOPSY SINGLE/MULTIPLE N/A 8/23/2017    Procedure: ESOPHAGOGASTRODUODENOSCOPY (EGD) with bx;  Surgeon: Luz Maria Daley MD;  [facility];   Service: Bariatrics    VA EXC SKIN BENIG 0 6-1 CM REMAINDR BODY N/A 12/8/2016    Procedure: EXCISION OF SCALP LESION;  Surgeon: Marshall Lopez MD;  [facility];  Service: General    VA HEMORRHOIDECTOMY,INT/EXT, 2+ COLUMNS/GROUPS N/A 2/12/2016    Procedure: HEMORRHOIDECTOMY EXCISION, EUA, Anal fistulotomy;  Surgeon: Bao Barnes MD;  [facility];  Service: Colorectal    UMBILICAL HERNIA REPAIR       Allergies   Allergen Reactions    Penicillins Rash       Substance Abuse History:    History   Alcohol Use    Yes     Comment: Social alcohol use     History   Drug Use No       Social History:    Social History     Social History    Marital status: /Civil Union     Spouse name: N/A    Number of children: 1    Years of education: some college     Occupational History    on disability      Social History Main Topics    Smoking status: Never Smoker    Smokeless tobacco: Never Used    Alcohol use Yes      Comment: Social alcohol use    Drug use: No    Sexual activity: No     Other Topics Concern    Not on file     Social History Narrative    Education: some college    Learning Disabilities: none    Marital History:     Children: 1 adult son    Living Arrangement: lives in home with parents    Occupational History: on disability, also works part time delivering groceries, 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    Functioning Relationships: good support system    Legal History: none     History: None       Family Psychiatric History:     Family History   Problem Relation Age of Onset    Depression Sister     Suicide Attempts Sister        History Review:  The following portions of the patient's history were reviewed and updated as appropriate: allergies, current medications, past family history, past medical history, past social history, past surgical history and problem list          OBJECTIVE:     Vital signs in last 24 hours:    Vitals:    02/01/19 1605   BP: 132/81   Pulse: 85   Weight: (!) 137 kg (301 lb)   Height: 5' 10" (1 778 m)       Mental Status Evaluation:    Appearance age appropriate, casually dressed   Behavior cooperative, psychomotor retardation   Speech normal rate, soft   Mood depressed, anxious   Affect blunted   Thought Processes organized, goal directed   Associations intact associations   Thought Content no overt delusions   Perceptual Disturbances: no auditory hallucinations, no visual hallucinations   Abnormal Thoughts  Risk Potential Suicidal ideation - None  Homicidal ideation - None  Potential for aggression - No   Orientation oriented to person, place, time/date and situation   Memory recent and remote memory grossly intact Consciousness alert and awake   Attention Span Concentration Span attention span and concentration appear shorter than expected for age   Intellect appears to be of average intelligence   Insight intact   Judgement intact   Muscle Strength and  Gait normal muscle strength and normal muscle tone, normal gait and normal balance   Motor activity no abnormal movements   Language no difficulty naming common objects, no difficulty repeating a phrase, no difficulty writing a sentence   Fund of Knowledge adequate knowledge of current events  adequate fund of knowledge regarding past history  adequate fund of knowledge regarding vocabulary    Pain moderate   Pain Scale 5       Laboratory Results: I have personally reviewed all pertinent laboratory/tests results  Recent Labs (last 4 months):   No visits with results within 4 Month(s) from this visit  Latest known visit with results is:   Appointment on 08/30/2017   Component Date Value    Hemoglobin A1C 08/30/2017 5 6     EAG 08/30/2017 114        Assessment/Plan:       Diagnoses and all orders for this visit:    Major depressive disorder, recurrent, severe without psychotic features ([facility] )  -     ARIPiprazole (ABILIFY) 10 mg tablet; Take 1 tablet (10 mg total) by mouth daily at bedtime for 180 days  -     buPROPion (WELLBUTRIN XL) 150 mg 24 hr tablet; Take 3 tablets (450 mg total) by mouth daily for 180 days  -     escitalopram (LEXAPRO) 10 mg tablet; Take 1 5 tablet (15 mg) daily for 7 days then 1 tablet (10 mg) daily for 7 days then 0 5 tablet (5 mg) daily for 7 days then stop Lexapro  -     DULoxetine (CYMBALTA) 30 mg delayed release capsule; Take 1 capsule (30 mg total) by mouth daily for 7 days then take 1 capsule (30 mg total) 2 times daily    ALYSHA (generalized anxiety disorder)  -     busPIRone (BUSPAR) 5 mg tablet; Take 1 tablet (5 mg total) by mouth 2 (two) times a day for 180 days    Other insomnia  -     zolpidem (AMBIEN) 10 mg tablet;  Take 0 5 tablets (5 mg total) by mouth daily at bedtime as needed for sleep        Treatment Recommendations/Precautions:    Continue Wellbutrin XL 450 mg daily to improve depressive symptoms  Decrease Lexapro to 15 mg daily for 7 days then decrease Lexapro to 10 mg daily for 7 days then decrease Lexapro to 5 mg daily for 7 days then stop Lexapro  Start Cymbalta 30 mg daily for 7 days then increase Cymbalta to 30 mg bid  Continue Buspar 5 mg bid to help with anxiety  Continue Abilify 10 mg at bedtime to augment antidepressant  Decrease Ambien to 5 mg at bedtime PRN due to hypersomnia  Simon Vincent states that he still needs Ambien to fall asleep  Medication management every 6 weeks  Refer for psychotherapy at [facility] [place] [address] [address] - he has not been following with his previous therapist and want to see someone new  Follows with family physician for glucose and lipid monitoring due to current therapy with antipsychotic medication  Follows with family physician for medical issues  He does not want to attend Partial Program and he does not feel that he needs to be in the hospital as he feels safe at home and does not have any suicidal thoughts at present  Has guns at home, but they are locked and father has the key  Risks/Benefits      Risks, Benefits And Possible Side Effects Of Medications:    Risks, benefits, and possible side effects of medications explained to Clint Castaneda including risk of parkinsonian symptoms, Tardive Dyskinesia and metabolic syndrome related to treatment with antipsychotic medications and risk of cardiovascular events in elderly related to treatment with antipsychotic medications  He verbalizes understanding and agreement for treatment      Controlled Medication Discussion:     Clint Castaneda has been filling controlled prescriptions on time as prescribed according to Yasm[address]      Psychotherapy Provided:     Individual psychotherapy provided: Yes  Counseling was provided during the session today for 20 minutes  Medications, treatment progress and treatment plan reviewed with Isadora Katz  Medication changes discussed with Isadora Katz  Goals discussed during in session: lessen anxiety and improve control of depression  Discussed with Isadora Katz coping with everyday stressors, ongoing anxiety and low motivation  Coping techniques including exercising, increasing energy and increasing motivation reviewed with Isadora Katz  Supportive therapy provided        Treatment Plan;    Completed and signed during the session: Yes - with Cookie Mora MD 02/01/19

## 2019-02-04 ENCOUNTER — TELEPHONE (OUTPATIENT)
Dept: PSYCHIATRY | Facility: CLINIC | Age: 54
End: 2019-02-04

## 2019-02-04 NOTE — TELEPHONE ENCOUNTER
----- Message from Jose Walters MD sent at 2/1/2019  4:41 PM EST -----  Regarding: Therapy referral  Please schedule Navi Parker for psychotherapy

## 2019-02-26 ENCOUNTER — OFFICE VISIT (OUTPATIENT)
Dept: BEHAVIORAL/MENTAL HEALTH CLINIC | Facility: CLINIC | Age: 54
End: 2019-02-26
Payer: MEDICARE

## 2019-02-26 DIAGNOSIS — F33.2 MAJOR DEPRESSIVE DISORDER, RECURRENT, SEVERE WITHOUT PSYCHOTIC FEATURES (HCC): Primary | Chronic | ICD-10-CM

## 2019-02-26 DIAGNOSIS — F41.1 GAD (GENERALIZED ANXIETY DISORDER): Chronic | ICD-10-CM

## 2019-02-26 PROCEDURE — 90791 PSYCH DIAGNOSTIC EVALUATION: CPT | Performed by: SOCIAL WORKER

## 2019-02-26 NOTE — PSYCH
Markomary grace Poe  [birth year]       Date of Initial Treatment Plan: 2/26/19   Date of Current Treatment Plan: 02/26/19    Treatment Plan Number 1     Strengths/Personal Resources for Self Care: Able to take medication daily     Diagnosis:   1  Major depressive disorder, recurrent, severe without psychotic features ([facility] [address] )     2  ALYSHA (generalized anxiety disorder)         Area of Needs: Ability to engage in normal daily routines      Long Term Goal 1: AI would like to become independent and set goals for myself  Target Date:  Completion Date:          Short Term Objectives for Goal 1: A I will be able to set and identify goals and objectives for myself and my daily life  , BI will be able to complete one objective per month and C I will be able to express my feelings in positive ways during treatment sessions  Long Term Goal 2: Reduce symptoms of depression and resume daily activiites  Target Date: 7/1/15  Completion Date: N/A    Short Term Objectives for Goal 2: AI will be able to engage in at least one activity that I enjoy, per month  and BI will be able to identify thoughts and triggers to depressive symptoms, and utilize one copign skills to mitigate those feelings  Long Term Goal # 3: N/A     Target Date: N/A  Completion Date: N/A    Short Term Objectives for Goal 3: N/A    GOAL 1: Modality: Individual 4x per month   Completion Date ongoing and Medication Management    GOAL 2: Modality: Individual 4x per month   Completion Date ongoing and Medication Management     Behavioral Health Treatment Plan [facility]: Diagnosis and Treatment Plan explained to Miguel A Wilson relates understanding diagnosis and is agreeable to Treatment Plan         Client Comments : Please share your thoughts, feelings, need and/or experiences regarding your treatment plan: __________________________________________________________________    __________________________________________________________________    __________________________________________________________________    __________________________________________________________________    _______________________________________                Patient signature, Date Time: __________________________________________             Physician cosigner signature, Date, Time: ________________________________

## 2019-02-26 NOTE — PSYCH
Assessment/Plan: Was seeing therapist, and Dr Miguel Cisneros feels that he should begin again  Injury at work, a couple operations  When returned to work he was terminated, due to [employer]   Trial was thrown out at the end of the summer  In [employer], not "at will" employee and case was thrown out  Depression created following the loss of the case  Was on workman's compensation and FMLA, broke food safety laws, "in the end they got away with everything"  Depression cycles for months  Will not leave the house for months  Identified that he had not left the home other than reasons for appointments since Thanksgiving  Diagnoses and all orders for this visit:    Major depressive disorder, recurrent, severe without psychotic features ([facility] [address] )    ALYSHA (generalized anxiety disorder)          Subjective:      Patient ID: Jose Huitron is a 48 y o  male  HPI:     Pre-morbid level of function and History of Present Illness: short periods of depression following injury on multiple occasions  Previous Psychiatric/psychological treatment/year: [facility]  Current Psychiatrist/Therapist: Haleigh Monet, Dr Miguel Cisneros  Outpatient and/or Partial and Other Community Resources Used (CTT, ICM, VNA): Outpatient  individual therapy and medicaiton management  Problem Assessment:     SOCIAL/VOCATION:  Family Constellation (include parents, relationship with each and pertinent Psych/Medical History):     Family History   Problem Relation Age of Onset    Depression Sister     Suicide Attempts Sister        Melisa Chowdary relates best to [facility] group was the only time he felt understood  he lives with family, parents  he does not live alone  Domestic Violence: No past history of domestic violence and There is no history of child abuse    Additional Comments related to family/relationships/peer support: Brother  in accident  Positive relationship with parents and two sisters, does not see them often    Parents supportive of him during difficult times  School or Work History (strengths/limitations/needs): [employer] part time work  Her highest grade level achieved was Two years of college  Financial status includes Struggling due to disability  Feels poorly as this is the cause of depressive symptoms  LEISURE ASSESSMENT (Include past and present hobbies/interests and level of involvement (Ex: Group/Club Affiliations): Nothing currently, previously played sports (baseball, golf)  his primary language is [place]  Preferred language is [place]  Ethnic considerations are na  Religions affiliations and level of involvement none   Does spirituality help you cope? Yes     FUNCTIONAL STATUS: There has been a recent change in Dilma ability to do the following: does not deisre to engage in daily activiites at times, but does not require support  Dilma learns best by  demostration    SUBSTANCE ABUSE ASSESSMENT: no substance abuse    LEGAL: No Mental Health Advance Directive or Power of  on file    Risk Assessment:   The following ratings are based on my interview(s) with Oneda Conn of Harm to Self:   Demographic risk factors include , male and  status  Historical Risk Factors include na  Recent Specific Risk Factors include na  Additional Factors for a Child or Adolescent na    Risk of Harm to Others:   Demographic Risk Factors include male and na  Historical Risk Factors include na  Recent Specific Risk Factors include na    Access to Weapons:   Dilma has access to the following weapons: yes, guns  The following steps have been taken to ensure weapons are properly secured: locked    Based on the above information, the client presents the following risk of harm to self or others:  low    The following interventions are recommended:   no intervention changes    Notes regarding this Risk Assessment: No identified SI/HI at this time    Access to weapons which are locked, however, denies thoughts of self harm or SI           Review Of Systems:     Mood Depression   Behavior Normal    Thought Content Normal   General Relationship Problems, Emotional Problems and Sleep Disturbances   Personality Change in Personality   Other Psych Symptoms Normal   Constitutional As Noted in HPI and Recent Wt Gain (10 Lbs)   ENT As Noted in HPI   Cardiovascular As Noted in HPI   Respiratory As Noted in HPI   Gastrointestinal As Noted in HPI   Genitourinary As Noted in HPI   Musculoskeletal As Noted in HPI   Integumentary As Noted in HPI   Neurological As Noted in HPI   Endocrine Normal          Mental status:  Appearance calm and cooperative , adequate hygiene and grooming and good eye contact    Mood depressed and uncertain   Affect affect was constricted   Speech decreased volume and speech soft   Thought Processes coherent/organized and normal thought processes   Hallucinations no hallucinations present    Thought Content no delusions   Abnormal Thoughts no suicidal thoughts  and no homicidal thoughts    Orientation  oriented to person and place and time   Remote Memory short term memory intact and long term memory intact   Attention Span concentration intact   Intellect Appears to be of Average Intelligence   Fund of Knowledge displays adequate knowledge of current events, adequate fund of knowledge regarding past history and adequate fund of knowledge regarding vocabulary    Insight Insight intact   Judgement judgment was intact   Muscle Strength Muscle strength and tone were normal and Normal gait    Language no difficulty naming common objects, no difficulty repeating a phrase  and no difficulty writing a sentence    Pain moderate to severe   Pain Scale 8

## 2019-04-09 ENCOUNTER — DOCUMENTATION (OUTPATIENT)
Dept: PSYCHIATRY | Facility: CLINIC | Age: 54
End: 2019-04-09

## 2019-05-20 ENCOUNTER — OFFICE VISIT (OUTPATIENT)
Dept: PSYCHIATRY | Facility: CLINIC | Age: 54
End: 2019-05-20
Payer: MEDICARE

## 2019-05-20 VITALS
DIASTOLIC BLOOD PRESSURE: 82 MMHG | BODY MASS INDEX: 42.8 KG/M2 | SYSTOLIC BLOOD PRESSURE: 125 MMHG | WEIGHT: 299 LBS | HEART RATE: 77 BPM | HEIGHT: 70 IN

## 2019-05-20 DIAGNOSIS — G47.09 OTHER INSOMNIA: Chronic | ICD-10-CM

## 2019-05-20 DIAGNOSIS — F33.2 MAJOR DEPRESSIVE DISORDER, RECURRENT, SEVERE WITHOUT PSYCHOTIC FEATURES (HCC): Primary | Chronic | ICD-10-CM

## 2019-05-20 DIAGNOSIS — F41.1 GAD (GENERALIZED ANXIETY DISORDER): Chronic | ICD-10-CM

## 2019-05-20 PROCEDURE — 99213 OFFICE O/P EST LOW 20 MIN: CPT | Performed by: PSYCHIATRY & NEUROLOGY

## 2019-05-20 PROCEDURE — 90833 PSYTX W PT W E/M 30 MIN: CPT | Performed by: PSYCHIATRY & NEUROLOGY

## 2019-05-20 RX ORDER — BUPROPION HYDROCHLORIDE 150 MG/1
450 TABLET ORAL DAILY
Qty: 270 TABLET | Refills: 1 | Status: SHIPPED | OUTPATIENT
Start: 2019-05-20 | End: 2020-05-29 | Stop reason: SDDI

## 2019-05-20 RX ORDER — ARIPIPRAZOLE 10 MG/1
10 TABLET ORAL
Qty: 90 TABLET | Refills: 1 | Status: SHIPPED | OUTPATIENT
Start: 2019-05-20 | End: 2020-05-29 | Stop reason: SDDI

## 2019-05-20 RX ORDER — DULOXETIN HYDROCHLORIDE 30 MG/1
90 CAPSULE, DELAYED RELEASE ORAL DAILY
Qty: 270 CAPSULE | Refills: 1 | Status: SHIPPED | OUTPATIENT
Start: 2019-05-20 | End: 2019-09-25 | Stop reason: ALTCHOICE

## 2019-05-20 RX ORDER — ZOLPIDEM TARTRATE 10 MG/1
10 TABLET ORAL
Qty: 30 TABLET | Refills: 3 | Status: SHIPPED | OUTPATIENT
Start: 2019-05-20 | End: 2019-09-19 | Stop reason: SDUPTHER

## 2019-05-20 RX ORDER — BUSPIRONE HYDROCHLORIDE 5 MG/1
5 TABLET ORAL 2 TIMES DAILY
Qty: 180 TABLET | Refills: 1 | Status: SHIPPED | OUTPATIENT
Start: 2019-05-20 | End: 2020-05-29 | Stop reason: SDDI

## 2019-05-23 ENCOUNTER — DOCUMENTATION (OUTPATIENT)
Dept: BEHAVIORAL/MENTAL HEALTH CLINIC | Facility: CLINIC | Age: 54
End: 2019-05-23

## 2019-07-22 ENCOUNTER — DOCUMENTATION (OUTPATIENT)
Dept: PSYCHIATRY | Facility: CLINIC | Age: 54
End: 2019-07-22

## 2019-07-22 NOTE — PROGRESS NOTES
Treatment Plan not completed within required time limits due to:  cancelled appointment  on 7/22/2019

## 2019-09-19 DIAGNOSIS — G47.09 OTHER INSOMNIA: Primary | Chronic | ICD-10-CM

## 2019-09-19 RX ORDER — ZOLPIDEM TARTRATE 10 MG/1
10 TABLET ORAL
Qty: 30 TABLET | Refills: 0 | Status: SHIPPED | OUTPATIENT
Start: 2019-09-19 | End: 2019-09-25 | Stop reason: SDUPTHER

## 2019-09-23 NOTE — PSYCH
MEDICATION MANAGEMENT NOTE        [facility]      Name and Date of Birth:  Christiano Clifford 48 y o  [birth year] MRN: [id number]    Date of Visit: September 25, 2019    SUBJECTIVE:    Akshat Alcala is seen today for a follow up for Major Depressive Disorder and Generalized Anxiety Disorder  He continues to experience ongoing symptoms since the last visit  He still feels depressed and frustrated, rates mood as 9 on a scale of 1 (best mood) to 10 (worst mood)  He reports ongoing significant anxiety symptoms  He has been worrying about medical problems as he was recently diagnosed with fatty liver and thickening of the gallbladder wall and has been following up with a gastroenterologist     He denies any suicidal ideation, intent or plan at present; denies any homicidal ideation, intent or plan at present  He has no auditory hallucinations, denies any visual hallucinations, has no delusional thoughts  He denies any side effects from current psychiatric medications  PHQ-9 is 21 today (increased from 19 at the last visit)  Marvin Thomas HPI ROS Appetite Changes and Sleep:     He reports hypersomnia, increase in number of sleep hours (12 hours), decreased appetite, recent weight loss (1 lbs), low energy    Review Of Systems:      Constitutional low energy and recent weight loss (1 lbs)   ENT negative   Cardiovascular negative   Respiratory negative   Gastrointestinal abdominal discomfort   Genitourinary negative   Musculoskeletal negative   Integumentary negative   Neurological negative   Endocrine negative   Other Symptoms none       Past Psychiatric History:      Past Inpatient Psychiatric Treatment:   No history of past inpatient psychiatric admissions  Past Outpatient Psychiatric Treatment:    In outpatient treatment at [address] [place] [address] [address] since 2/2017    Past Suicide Attempts: no  Past Violent Behavior: no  Past Psychiatric Medication Trials: Lexapro, Wellbutrin XL, Trazodone, Abilify, Ambien, Cymbalta and Melatonin     Traumatic History:      Abuse: no history of sexual abuse, no history of physical abuse  Other Traumatic Events: none     Past Medical History:    Past Medical History:   Diagnosis Date    Adrenal adenoma     Arthritis     Carpal tunnel syndrome     Chronic back pain     Chronic pain disorder     Cubital tunnel syndrome     Fatty liver     Folliculitis     Hemorrhoid     Liver lesion     Lumbago     Morbid obesity (HCC)     Myofascial pain syndrome     Scalp lesion     Sleep apnea     CPAP dependance    Thickening of wall of gallbladder     Tinea capitis     Ulnar 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     Umbilical hernia     Vitamin D deficiency      Past Medical History Pertinent Negatives:   Diagnosis Date Noted    Head injury     Seizures ([address] )      Past Surgical History:   Procedure Laterality Date    CARPAL TUNNEL RELEASE Bilateral     EYE MUSCLE SURGERY      as a child for amblyopia    KNEE ARTHROSCOPY      PATELLA SURGERY Right     scraped out scar tissue per pt     MI COLONOSCOPY FLX DX W/COLLJ SPEC WHEN PFRMD N/A 2/10/2016    Procedure: COLONOSCOPY;  Surgeon: Caitlin Orellana MD;  [facility]; Service: Colorectal    MI EGD TRANSORAL BIOPSY SINGLE/MULTIPLE N/A 8/23/2017    Procedure: ESOPHAGOGASTRODUODENOSCOPY (EGD) with bx;  Surgeon: Vernon Jacobson MD;  Location: [facility];   Service: Bariatrics    MI EXC SKIN BENIG 0 6-1 CM REMAINDR BODY N/A 12/8/2016    Procedure: EXCISION OF SCALP LESION;  Surgeon: Devang Garcia MD;  [facility];  Service: General    MI HEMORRHOIDECTOMY,INT/EXT, 2+ COLUMNS/GROUPS N/A 2/12/2016    Procedure: HEMORRHOIDECTOMY EXCISION, EUA, Anal fistulotomy;  Surgeon: Caitlin Orellana MD;  [facility];  Service: Colorectal    UMBILICAL HERNIA REPAIR       Allergies   Allergen Reactions    Penicillins Rash       Substance Abuse History:    Social History     Substance and Sexual Activity Alcohol Use Yes    Frequency: Monthly or less    Drinks per session: 1 or 2    Binge frequency: Never    Comment: Social alcohol use     Social History     Substance and Sexual Activity   Drug Use No       Social History:    Social History     Socioeconomic History    Marital status: /Civil Union     Spouse name: Not on file    Number of children: 1    Years of education: some college    Highest education level: Some college, no degree   Occupational History    Occupation: on disability   Social Needs    Financial resource strain: Not hard at all   Carmen-Sherrie insecurity:     Worry: Never true     Inability: Never true    Transportation needs:     Medical: No     Non-medical: No   Tobacco Use    Smoking status: Never Smoker    Smokeless tobacco: Never Used   Substance and Sexual Activity    Alcohol use: Yes     Frequency: Monthly or less     Drinks per session: 1 or 2     Binge frequency: Never     Comment: Social alcohol use    Drug use: No    Sexual activity: Never   Lifestyle    Physical activity:     Days per week: 1 day     Minutes per session: 30 min    Stress:  To some extent   Relationships    Social connections:     Talks on phone: Never     Gets together: Never     Attends [employer] service: Never     Active member of club or organization: No     Attends meetings of clubs or organizations: Never     Relationship status:     Intimate partner violence:     Fear of current or ex partner: No     Emotionally abused: No     Physically abused: No     Forced sexual activity: No   Other Topics Concern    Not on file   Social History Narrative    Education: some college    Learning Disabilities: none    Marital History:     Children: 1 adult son    Living Arrangement: lives in home with parents    Occupational History: on disability, 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    [address]: good support system    Legal History: none     History: None       Family Psychiatric History:     Family History   Problem Relation Age of Onset    Depression Sister     Suicide Attempts Sister        History Review:  The following portions of the patient's history were reviewed and updated as appropriate: allergies, current medications, past family history, past medical history, past social history, past surgical history and problem list          OBJECTIVE:     Vital signs in last 24 hours:    Vitals:    09/25/19 1601   BP: 132/85   Pulse: 82   Weight: 135 kg (298 lb)   Height: 5' 10" (1 778 m)       Mental Status Evaluation:    Appearance age appropriate, casually dressed   Behavior cooperative   Speech normal rate, soft, decreased volume   Mood depressed, anxious   Affect constricted   Thought Processes organized, goal directed   Associations intact associations   Thought Content no overt delusions   Perceptual Disturbances: no auditory hallucinations, no visual hallucinations   Abnormal Thoughts  Risk Potential Suicidal ideation - None  Homicidal ideation - None  Potential for aggression - No   Orientation oriented to person, place, time/date and situation   Memory recent and remote memory grossly intact   Consciousness alert and awake   Attention Span Concentration Span attention span and concentration appear shorter than expected for age   Intellect appears to be of average intelligence   Insight intact   Judgement intact   Muscle Strength and  Gait normal muscle strength and normal muscle tone, normal gait and normal balance   Motor activity no abnormal movements   Language no difficulty naming common objects, no difficulty repeating a phrase, no difficulty writing a sentence   Fund of Knowledge adequate knowledge of current events  adequate fund of knowledge regarding past history  adequate fund of knowledge regarding vocabulary    Pain moderate   Pain Scale 5       Laboratory Results: I have personally reviewed all pertinent laboratory/tests results  Recent Labs (last 4 months):   No visits with results within 4 Month(s) from this visit  Latest known visit with results is:   Appointment on 08/30/2017   Component Date Value    Hemoglobin A1C 08/30/2017 5 6     EAG 08/30/2017 114        Assessment/Plan:       Diagnoses and all orders for this visit:    Major depressive disorder, recurrent, severe without psychotic features ([address] )  -     sertraline (ZOLOFT) 50 mg tablet; Take 0 5 tablets (25 mg total) by mouth daily for 7 days, THEN 1 tablet (50 mg total) daily  ALYSHA (generalized anxiety disorder)    Other insomnia  -     zolpidem (AMBIEN) 10 mg tablet; Take 1 tablet (10 mg total) by mouth daily at bedtime as needed for sleep To be filled on or after 10/19/19          Treatment Recommendations/Precautions:    Continue Wellbutrin XL 450 mg daily to improve depressive symptoms  Decrease Cymbalta to 60 mg daily for 7 days then decrease Cymbalta to 30 mg daily for 7 days then stop Cymbalta - not beneficial, also Faisal Fontanez was just diagnosed with fatty liver  Start Zoloft 25 mg daily for 7 days then increase Zoloft to 50 mg daily to help with depressive symptoms  Continue Buspar 5 mg bid to help with anxiety  Continue Abilify 10 mg at bedtime to augment antidepressant  Continue Ambien 10 mg at bedtime PRN to help with sleep  Medication management every 2 months  Follows with family physician for glucose and lipid monitoring due to current therapy with antipsychotic medication  Follows with family physician for medical issues  Referral to Partial Hospitalization Program  He has not been seeing a therapist for some time  Medication regimen was discussed with Faisal Fontanez for 10 minutes  Written dosing instructions were provided      Risks/Benefits      Risks, Benefits And Possible Side Effects Of Medications:    Risks, benefits, and possible side effects of medications explained to Faisal Fontanez including risk of parkinsonian symptoms, Tardive Dyskinesia and metabolic syndrome related to treatment with antipsychotic medications, risk of suicidality and serotonin syndrome related to treatment with antidepressants and risk of impaired next-day mental alertness, complex sleep-related behavior and dependence related to treatment with hypnotic medications  He verbalizes understanding and agreement for treatment  Controlled Medication Discussion:     Ignacia Herrera has been filling controlled prescriptions on time as prescribed according to [address] [place] [address] Drive Monitoring Program    Psychotherapy Provided:     Individual psychotherapy provided: Yes  Counseling was provided during the session today for 16 minutes  Medications, treatment progress and treatment plan reviewed with Ignacia Herrera  Medication changes discussed with Ignacia Herrera  Medication education provided to Ignacia Herrera  Goals discussed during in session: improve control of anxiety and help with depression  Discussed with Ignacia Herrera coping with health issues and ongoing anxiety  Coping mechanisms including exercising, increasing energy, increasing motivation, maintain healthy diet and maintain heathy sleeping hygiene reviewed with Ignacia Herrera  Supportive therapy provided        Treatment Plan;    Completed and signed during the session: Yes - with Freddie Laboy MD 09/25/19

## 2019-09-25 ENCOUNTER — OFFICE VISIT (OUTPATIENT)
Dept: PSYCHIATRY | Facility: CLINIC | Age: 54
End: 2019-09-25
Payer: MEDICARE

## 2019-09-25 VITALS
SYSTOLIC BLOOD PRESSURE: 132 MMHG | WEIGHT: 298 LBS | HEART RATE: 82 BPM | DIASTOLIC BLOOD PRESSURE: 85 MMHG | HEIGHT: 70 IN | BODY MASS INDEX: 42.66 KG/M2

## 2019-09-25 DIAGNOSIS — F41.1 GAD (GENERALIZED ANXIETY DISORDER): Chronic | ICD-10-CM

## 2019-09-25 DIAGNOSIS — G47.09 OTHER INSOMNIA: Chronic | ICD-10-CM

## 2019-09-25 DIAGNOSIS — F33.2 MAJOR DEPRESSIVE DISORDER, RECURRENT, SEVERE WITHOUT PSYCHOTIC FEATURES (HCC): Primary | Chronic | ICD-10-CM

## 2019-09-25 PROCEDURE — 90833 PSYTX W PT W E/M 30 MIN: CPT | Performed by: PSYCHIATRY & NEUROLOGY

## 2019-09-25 PROCEDURE — 99213 OFFICE O/P EST LOW 20 MIN: CPT | Performed by: PSYCHIATRY & NEUROLOGY

## 2019-09-25 RX ORDER — ZOLPIDEM TARTRATE 10 MG/1
10 TABLET ORAL
Qty: 30 TABLET | Refills: 3 | Status: SHIPPED | OUTPATIENT
Start: 2019-10-19 | End: 2020-02-11 | Stop reason: SDUPTHER

## 2019-09-25 NOTE — BH TREATMENT PLAN
TREATMENT PLAN (Medication Management Only)        [facility]    Name/Date of Birth/MRN:  Jesusita Morris 48 y o  [birth year] MRN: [id number]  Date of Treatment Plan: September 25, 2019  Diagnosis/Diagnoses:   1  Major depressive disorder, recurrent, severe without psychotic features ([facility] [address] )    2  ALYSHA (generalized anxiety disorder)    3  Other insomnia      Strengths/Personal Resources for Self-Care: "taking medications"  Area/Areas of need (in own words): "consistent schedule"  1  Long Term Goal:   "retain mental and physical", lessen anxiety, improve control of depression  Target Date: 2 months - 11/25/2019  Person/Persons responsible for completion of goal: Hieu Zimmerman  2  Short Term Objective (s) - How will we reach this goal?:   A  Provider new recommended medication/dosage changes and/or continue medication(s): discontinue Cymbalta, start Zoloft, continue all other medications (Wellbutrin XL, Abilify and Buspar)  B   "continuous treatment"  C   N/A  Target Date: 2 months - 11/25/2019  Person/Persons Responsible for Completion of Goal: Hieu Zimmerman   Progress Towards Goals: limited progress  Treatment Modality: medication management every 2 months, referral to Partial Hospitalization Program  Review due 90 to 120 days from date of this plan: 4 months - 1/25/2020  Expected length of service: ongoing treatment  My Physician/PA/NP and I have developed this plan together and I agree to work on the goals and objectives  I understand the treatment goals that were developed for my treatment    Electronic Signatures: on file (unless signed below)    Rick Kennedy MD 09/25/19

## 2019-09-30 ENCOUNTER — TELEPHONE (OUTPATIENT)
Dept: PSYCHOLOGY | Facility: CLINIC | Age: 54
End: 2019-09-30

## 2019-10-01 ENCOUNTER — TELEPHONE (OUTPATIENT)
Dept: PSYCHOLOGY | Facility: CLINIC | Age: 54
End: 2019-10-01

## 2019-10-03 ENCOUNTER — TELEPHONE (OUTPATIENT)
Dept: PSYCHOLOGY | Facility: CLINIC | Age: 54
End: 2019-10-03

## 2019-10-08 ENCOUNTER — TRANSCRIBE ORDERS (OUTPATIENT)
Dept: LAB | Facility: CLINIC | Age: 54
End: 2019-10-08

## 2019-10-08 ENCOUNTER — APPOINTMENT (OUTPATIENT)
Dept: LAB | Facility: CLINIC | Age: 54
End: 2019-10-08
Payer: MEDICARE

## 2019-10-08 DIAGNOSIS — R10.9 STOMACH ACHE: ICD-10-CM

## 2019-10-08 DIAGNOSIS — R10.31 RLQ ABDOMINAL PAIN: ICD-10-CM

## 2019-10-08 DIAGNOSIS — R10.31 RLQ ABDOMINAL PAIN: Primary | ICD-10-CM

## 2019-10-08 DIAGNOSIS — Z12.5 SPECIAL SCREENING FOR MALIGNANT NEOPLASM OF PROSTATE: ICD-10-CM

## 2019-10-08 DIAGNOSIS — R10.9 STOMACH ACHE: Primary | ICD-10-CM

## 2019-10-08 LAB
ALBUMIN SERPL BCP-MCNC: 3.7 G/DL (ref 3.5–5)
ALP SERPL-CCNC: 78 U/L (ref 46–116)
ALT SERPL W P-5'-P-CCNC: 26 U/L (ref 12–78)
ANION GAP SERPL CALCULATED.3IONS-SCNC: 8 MMOL/L (ref 4–13)
AST SERPL W P-5'-P-CCNC: 16 U/L (ref 5–45)
BASOPHILS # BLD AUTO: 0.04 THOUSANDS/ΜL (ref 0–0.1)
BASOPHILS NFR BLD AUTO: 1 % (ref 0–1)
BILIRUB SERPL-MCNC: 1 MG/DL (ref 0.2–1)
BUN SERPL-MCNC: 16 MG/DL (ref 5–25)
CALCIUM SERPL-MCNC: 8.6 MG/DL (ref 8.3–10.1)
CHLORIDE SERPL-SCNC: 104 MMOL/L (ref 100–108)
CO2 SERPL-SCNC: 26 MMOL/L (ref 21–32)
CREAT SERPL-MCNC: 1.16 MG/DL (ref 0.6–1.3)
CRP SERPL QL: 4.2 MG/L
EOSINOPHIL # BLD AUTO: 0.11 THOUSAND/ΜL (ref 0–0.61)
EOSINOPHIL NFR BLD AUTO: 1 % (ref 0–6)
ERYTHROCYTE [DISTWIDTH] IN BLOOD BY AUTOMATED COUNT: 12.5 % (ref 11.6–15.1)
GFR SERPL CREATININE-BSD FRML MDRD: 72 ML/MIN/1.73SQ M
GLUCOSE P FAST SERPL-MCNC: 107 MG/DL (ref 65–99)
HCT VFR BLD AUTO: 46.7 % (ref 36.5–49.3)
HGB BLD-MCNC: 15.7 G/DL (ref 12–17)
IMM GRANULOCYTES # BLD AUTO: 0.03 THOUSAND/UL (ref 0–0.2)
IMM GRANULOCYTES NFR BLD AUTO: 0 % (ref 0–2)
LYMPHOCYTES # BLD AUTO: 2.21 THOUSANDS/ΜL (ref 0.6–4.47)
LYMPHOCYTES NFR BLD AUTO: 28 % (ref 14–44)
MCH RBC QN AUTO: 29.5 PG (ref 26.8–34.3)
MCHC RBC AUTO-ENTMCNC: 33.6 G/DL (ref 31.4–37.4)
MCV RBC AUTO: 88 FL (ref 82–98)
MONOCYTES # BLD AUTO: 0.81 THOUSAND/ΜL (ref 0.17–1.22)
MONOCYTES NFR BLD AUTO: 10 % (ref 4–12)
NEUTROPHILS # BLD AUTO: 4.76 THOUSANDS/ΜL (ref 1.85–7.62)
NEUTS SEG NFR BLD AUTO: 60 % (ref 43–75)
NRBC BLD AUTO-RTO: 0 /100 WBCS
PLATELET # BLD AUTO: 278 THOUSANDS/UL (ref 149–390)
PMV BLD AUTO: 9.7 FL (ref 8.9–12.7)
POTASSIUM SERPL-SCNC: 4 MMOL/L (ref 3.5–5.3)
PROT SERPL-MCNC: 7.2 G/DL (ref 6.4–8.2)
PSA SERPL-MCNC: 1.8 NG/ML (ref 0–4)
RBC # BLD AUTO: 5.33 MILLION/UL (ref 3.88–5.62)
SODIUM SERPL-SCNC: 138 MMOL/L (ref 136–145)
TSH SERPL DL<=0.05 MIU/L-ACNC: 1.77 UIU/ML (ref 0.36–3.74)
WBC # BLD AUTO: 7.96 THOUSAND/UL (ref 4.31–10.16)

## 2019-10-08 PROCEDURE — 36415 COLL VENOUS BLD VENIPUNCTURE: CPT

## 2019-10-08 PROCEDURE — 86140 C-REACTIVE PROTEIN: CPT

## 2019-10-08 PROCEDURE — 84443 ASSAY THYROID STIM HORMONE: CPT

## 2019-10-08 PROCEDURE — 80053 COMPREHEN METABOLIC PANEL: CPT

## 2019-10-08 PROCEDURE — G0103 PSA SCREENING: HCPCS

## 2019-10-08 PROCEDURE — 85025 COMPLETE CBC W/AUTO DIFF WBC: CPT

## 2019-11-19 ENCOUNTER — TELEPHONE (OUTPATIENT)
Dept: PSYCHOLOGY | Facility: CLINIC | Age: 54
End: 2019-11-19

## 2019-11-21 ENCOUNTER — HOSPITAL ENCOUNTER (EMERGENCY)
Facility: HOSPITAL | Age: 54
Discharge: HOME | End: 2019-11-21
Attending: EMERGENCY MEDICINE
Payer: MEDICARE

## 2019-11-21 ENCOUNTER — APPOINTMENT (EMERGENCY)
Dept: CT IMAGING | Facility: HOSPITAL | Age: 54
End: 2019-11-21
Payer: MEDICARE

## 2019-11-21 VITALS
SYSTOLIC BLOOD PRESSURE: 139 MMHG | RESPIRATION RATE: 16 BRPM | HEIGHT: 70 IN | DIASTOLIC BLOOD PRESSURE: 83 MMHG | OXYGEN SATURATION: 98 % | BODY MASS INDEX: 42.73 KG/M2 | WEIGHT: 298.5 LBS | HEART RATE: 83 BPM | TEMPERATURE: 98.3 F

## 2019-11-21 DIAGNOSIS — IMO0001 PARESTHESIAS/NUMBNESS: Primary | ICD-10-CM

## 2019-11-21 LAB
ALBUMIN SERPL BCP-MCNC: 3.3 G/DL (ref 3.5–5)
ALP SERPL-CCNC: 77 U/L (ref 46–116)
ALT SERPL W P-5'-P-CCNC: 27 U/L (ref 12–78)
ANION GAP SERPL CALCULATED.3IONS-SCNC: 6 MMOL/L (ref 4–13)
AST SERPL W P-5'-P-CCNC: 31 U/L (ref 5–45)
BASOPHILS # BLD AUTO: 0.05 THOUSANDS/ΜL (ref 0–0.1)
BASOPHILS NFR BLD AUTO: 1 % (ref 0–1)
BILIRUB SERPL-MCNC: 1 MG/DL (ref 0.2–1)
BUN SERPL-MCNC: 16 MG/DL (ref 5–25)
CALCIUM SERPL-MCNC: 8.4 MG/DL (ref 8.3–10.1)
CHLORIDE SERPL-SCNC: 105 MMOL/L (ref 100–108)
CO2 SERPL-SCNC: 26 MMOL/L (ref 21–32)
CREAT SERPL-MCNC: 1.07 MG/DL (ref 0.6–1.3)
EOSINOPHIL # BLD AUTO: 0.07 THOUSAND/ΜL (ref 0–0.61)
EOSINOPHIL NFR BLD AUTO: 1 % (ref 0–6)
ERYTHROCYTE [DISTWIDTH] IN BLOOD BY AUTOMATED COUNT: 12.6 % (ref 11.6–15.1)
GFR SERPL CREATININE-BSD FRML MDRD: 79 ML/MIN/1.73SQ M
GLUCOSE SERPL-MCNC: 112 MG/DL (ref 65–140)
HCT VFR BLD AUTO: 44 % (ref 36.5–49.3)
HGB BLD-MCNC: 15 G/DL (ref 12–17)
IMM GRANULOCYTES # BLD AUTO: 0.03 THOUSAND/UL (ref 0–0.2)
IMM GRANULOCYTES NFR BLD AUTO: 0 % (ref 0–2)
LYMPHOCYTES # BLD AUTO: 1.84 THOUSANDS/ΜL (ref 0.6–4.47)
LYMPHOCYTES NFR BLD AUTO: 24 % (ref 14–44)
MCH RBC QN AUTO: 29.5 PG (ref 26.8–34.3)
MCHC RBC AUTO-ENTMCNC: 34.1 G/DL (ref 31.4–37.4)
MCV RBC AUTO: 87 FL (ref 82–98)
MONOCYTES # BLD AUTO: 0.62 THOUSAND/ΜL (ref 0.17–1.22)
MONOCYTES NFR BLD AUTO: 8 % (ref 4–12)
NEUTROPHILS # BLD AUTO: 4.97 THOUSANDS/ΜL (ref 1.85–7.62)
NEUTS SEG NFR BLD AUTO: 66 % (ref 43–75)
NRBC BLD AUTO-RTO: 0 /100 WBCS
PLATELET # BLD AUTO: 274 THOUSANDS/UL (ref 149–390)
PMV BLD AUTO: 9 FL (ref 8.9–12.7)
POTASSIUM SERPL-SCNC: 4.9 MMOL/L (ref 3.5–5.3)
PROT SERPL-MCNC: 7 G/DL (ref 6.4–8.2)
RBC # BLD AUTO: 5.08 MILLION/UL (ref 3.88–5.62)
SODIUM SERPL-SCNC: 137 MMOL/L (ref 136–145)
WBC # BLD AUTO: 7.58 THOUSAND/UL (ref 4.31–10.16)

## 2019-11-21 PROCEDURE — 36415 COLL VENOUS BLD VENIPUNCTURE: CPT | Performed by: EMERGENCY MEDICINE

## 2019-11-21 PROCEDURE — 80053 COMPREHEN METABOLIC PANEL: CPT | Performed by: EMERGENCY MEDICINE

## 2019-11-21 PROCEDURE — 70450 CT HEAD/BRAIN W/O DYE: CPT

## 2019-11-21 PROCEDURE — 99284 EMERGENCY DEPT VISIT MOD MDM: CPT | Performed by: EMERGENCY MEDICINE

## 2019-11-21 PROCEDURE — 85025 COMPLETE CBC W/AUTO DIFF WBC: CPT | Performed by: EMERGENCY MEDICINE

## 2019-11-21 PROCEDURE — 99285 EMERGENCY DEPT VISIT HI MDM: CPT

## 2019-11-21 NOTE — ED PROVIDER NOTES
History  Chief Complaint   Patient presents with    Extremity Weakness     Extremity pain/numbness and tingling since last night  Headache x2 weeks  47 y/o male presents today complaining of extremity numbness and tingling x4 for several months  No weakness  No speech difficulty  No neuro deficit  History provided by:  Patient  Neurologic Problem   Location:  Extremity numbness  Severity:  Unable to specify  Timing:  Intermittent  Context:  Only occurs while sleeping  Relieved by:  Nothing tried  Worsened by:  Nothing  Ineffective treatments:  Nothing tried  Associated symptoms: headaches ( intermittent)    Associated symptoms: no abdominal pain, no chest pain, no congestion, no diarrhea, no fatigue, no fever, no loss of consciousness, no rash, no rhinorrhea, no shortness of breath, no sore throat and no wheezing        Prior to Admission Medications   Prescriptions Last Dose Informant Patient Reported? Taking? ARIPiprazole (ABILIFY) 10 mg tablet   No No   Sig: Take 1 tablet (10 mg total) by mouth daily at bedtime for 180 days   buPROPion (WELLBUTRIN XL) 150 mg 24 hr tablet   No No   Sig: Take 3 tablets (450 mg total) by mouth daily for 180 days   busPIRone (BUSPAR) 5 mg tablet   No No   Sig: Take 1 tablet (5 mg total) by mouth 2 (two) times a day for 180 days   sertraline (ZOLOFT) 50 mg tablet   No No   Sig: Take 0 5 tablets (25 mg total) by mouth daily for 7 days, THEN 1 tablet (50 mg total) daily     zolpidem (AMBIEN) 10 mg tablet   No No   Sig: Take 1 tablet (10 mg total) by mouth daily at bedtime as needed for sleep To be filled on or after 10/19/19      Facility-Administered Medications: None       Past Medical History:   Diagnosis Date    Adrenal adenoma     Arthritis     Carpal tunnel syndrome     Chronic back pain     Chronic pain disorder     Cubital tunnel syndrome     Fatty liver     Folliculitis     Hemorrhoid     Liver lesion     Lumbago     Morbid obesity ([facility] [address] )     Myofascial pain syndrome     Scalp lesion     Sleep apnea     CPAP dependance    Thickening of wall of gallbladder     Tinea capitis     Ulnar 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     Umbilical hernia     Vitamin D deficiency        Past Surgical History:   Procedure Laterality Date    CARPAL TUNNEL RELEASE Bilateral     EYE MUSCLE SURGERY      as a child for amblyopia    KNEE ARTHROSCOPY      PATELLA SURGERY Right     scraped out scar tissue per pt     ND COLONOSCOPY FLX DX W/COLLJ SPEC WHEN PFRMD N/A 2/10/2016    Procedure: COLONOSCOPY;  Surgeon: Shane Christy MD;  [facility]; Service: Colorectal    ND EGD TRANSORAL BIOPSY SINGLE/MULTIPLE N/A 8/23/2017    Procedure: ESOPHAGOGASTRODUODENOSCOPY (EGD) with bx;  Surgeon: Anna Chacko MD;  [facility]; Service: Bariatrics    ND EXC SKIN BENIG 0 6-1 CM REMAINDR BODY N/A 12/8/2016    Procedure: EXCISION OF SCALP LESION;  Surgeon: Eldon Hodge MD;  [facility];  Service: General    ND HEMORRHOIDECTOMY,INT/EXT, 2+ COLUMNS/GROUPS N/A 2/12/2016    Procedure: HEMORRHOIDECTOMY EXCISION, EUA, Anal fistulotomy;  Surgeon: Shane Christy MD;  [facility];  Service: Colorectal    UMBILICAL HERNIA REPAIR         Family History   Problem Relation Age of Onset    Depression Sister     Suicide Attempts Sister      I have reviewed and agree with the history as documented  Social History     Tobacco Use    Smoking status: Never Smoker    Smokeless tobacco: Never Used   Substance Use Topics    Alcohol use: Yes     Frequency: Monthly or less     Drinks per session: 1 or 2     Binge frequency: Never     Comment: Social alcohol use    Drug use: No        Review of Systems   Constitutional: Negative for chills, fatigue and fever  HENT: Negative for congestion, postnasal drip, rhinorrhea, sore throat and trouble swallowing  Eyes: Negative for visual disturbance     Respiratory: Negative for chest tightness, shortness of breath and wheezing  Cardiovascular: Negative for chest pain  Gastrointestinal: Negative for abdominal pain and diarrhea  Genitourinary: Negative for dysuria  Musculoskeletal: Negative for back pain  Skin: Negative for rash  Allergic/Immunologic: Negative for immunocompromised state  Neurological: Positive for numbness and headaches ( intermittent)  Negative for dizziness, loss of consciousness and light-headedness  Psychiatric/Behavioral: Negative for confusion  Physical Exam  Physical Exam   Constitutional: He is oriented to person, place, and time  He appears well-developed and well-nourished  HENT:   Head: Normocephalic and atraumatic  Mouth/Throat: Uvula is midline, oropharynx is clear and moist and mucous membranes are normal  No tonsillar exudate  Eyes: Pupils are equal, round, and reactive to light  Neck: Normal range of motion  Neck supple  Cardiovascular: Normal rate and regular rhythm  Pulmonary/Chest: Effort normal and breath sounds normal    Abdominal: Soft  Bowel sounds are normal  There is no tenderness  There is no rebound and no guarding  Musculoskeletal: He exhibits no edema, tenderness or deformity  Neurological: He is alert and oriented to person, place, and time  No neurologic deficits  Alert and oriented to person, place, and time  GCS 15  CN II-XII intact  Speech is clear and not slurred  No word finding issues  Sensation grossly intact  Finger to nose intact bilaterally  Strength equal upper extremities b/l  Strength equal in lower extremities b/l  Able to hold extremities against gravity x 10 seconds without drift x 4  No pronator drift  Skin: Skin is warm and dry  Capillary refill takes less than 2 seconds  Psychiatric: He has a normal mood and affect  Nursing note and vitals reviewed        Vital Signs  ED Triage Vitals   Temperature Pulse Respirations Blood Pressure SpO2   11/21/19 1309 11/21/19 1241 11/21/19 1241 11/21/19 1241 11/21/19 1241 98 3 °F (36 8 °C) 83 16 139/83 98 %      Temp Source Heart Rate Source Patient Position - Orthostatic VS BP Location FiO2 (%)   11/21/19 1309 11/21/19 1241 11/21/19 1241 11/21/19 1241 --   Oral Monitor Lying Right arm       Pain Score       11/21/19 1241       3           Vitals:    11/21/19 1241   BP: 139/83   Pulse: 83   Patient Position - Orthostatic VS: Lying         Visual Acuity      ED Medications  Medications - No data to display    Diagnostic Studies  Results Reviewed     Procedure Component Value Units Date/Time    Comprehensive metabolic panel [[id number]]  (Abnormal) Collected:  11/21/19 1329    Lab Status:  Final result Specimen:  Blood from Hand, Right Updated:  11/21/19 1348     Sodium 137 mmol/L      Potassium 4 9 mmol/L      Chloride 105 mmol/L      CO2 26 mmol/L      ANION GAP 6 mmol/L      BUN 16 mg/dL      Creatinine 1 07 mg/dL      Glucose 112 mg/dL      Calcium 8 4 mg/dL      AST 31 U/L      ALT 27 U/L      Alkaline Phosphatase 77 U/L      Total Protein 7 0 g/dL      Albumin 3 3 g/dL      Total Bilirubin 1 00 mg/dL      eGFR 79 ml/min/1 73sq m     Narrative:       [place] guidelines for Chronic Kidney Disease (CKD):     Stage 1 with normal or high GFR (GFR > 90 mL/min/1 73 square meters)    Stage 2 Mild CKD (GFR = 60-89 mL/min/1 73 square meters)    Stage 3A Moderate CKD (GFR = 45-59 mL/min/1 73 square meters)    Stage 3B Moderate CKD (GFR = 30-44 mL/min/1 73 square meters)    Stage 4 Severe CKD (GFR = 15-29 mL/min/1 73 square meters)    Stage 5 End Stage CKD (GFR <15 mL/min/1 73 square meters)  Note: GFR calculation is accurate only with a steady state creatinine    CBC and differential [[id number]] Collected:  11/21/19 1329    Lab Status:  Final result Specimen:  Blood from Hand, Right Updated:  11/21/19 1335     WBC 7 58 Thousand/uL      RBC 5 08 Million/uL      Hemoglobin 15 0 g/dL      Hematocrit 44 0 %      MCV 87 fL      MCH 29 5 pg      MCHC 34 1 g/dL RDW 12 6 %      MPV 9 0 fL      Platelets 965 Thousands/uL      nRBC 0 /100 WBCs      Neutrophils Relative 66 %      Immat GRANS % 0 %      Lymphocytes Relative 24 %      Monocytes Relative 8 %      Eosinophils Relative 1 %      Basophils Relative 1 %      Neutrophils Absolute 4 97 Thousands/µL      Immature Grans Absolute 0 03 Thousand/uL      Lymphocytes Absolute 1 84 Thousands/µL      Monocytes Absolute 0 62 Thousand/µL      Eosinophils Absolute 0 07 Thousand/µL      Basophils Absolute 0 05 Thousands/µL                  CT head without contrast   Final Result by Hugh Melissa MD (11/21 8401)      No acute intracranial abnormality  Workstation performed: [id number]                    Procedures  Procedures       ED Course                               MDM  Number of Diagnoses or Management Options  Paresthesias/numbness: new and requires workup  Diagnosis management comments: 2:47 PM  Labs, physical exam and CT head negative  Nothing in the workup to explain his intermittent paresthesias  Recommend f/u with PCP as outpatient  RTED instructions reviewed          Amount and/or Complexity of Data Reviewed  Clinical lab tests: ordered and reviewed  Tests in the radiology section of CPT®: ordered and reviewed  Tests in the medicine section of CPT®: ordered and reviewed  Review and summarize past medical records: yes  Independent visualization of images, tracings, or specimens: yes    Risk of Complications, Morbidity, and/or Mortality  Presenting problems: high  Diagnostic procedures: high  Management options: high    Patient Progress  Patient progress: stable      Disposition  Final diagnoses:   Paresthesias/numbness     Time reflects when diagnosis was documented in both MDM as applicable and the Disposition within this note     Time User Action Codes Description Comment    11/21/2019 12:59 PM [facility] [R20 [id number]] Paresthesias/numbness       ED Disposition     ED Disposition Condition Date/Time Comment    Discharge Stable u Nov 21, 2019  5:89 PM Vadim Schulte discharge to home/self care  Follow-up Information     Follow up With Specialties Details Why Contact Info Additional Information    Zoë Guzmán MD Family Medicine Schedule an appointment as soon as possible for a visit  As needed [address] Emergency Department Emergency Medicine  If symptoms worsen [address] [facility]  AN ED, [address], [place], [place], [address]          Discharge Medication List as of 11/21/2019  2:04 PM      CONTINUE these medications which have NOT CHANGED    Details   ARIPiprazole (ABILIFY) 10 mg tablet Take 1 tablet (10 mg total) by mouth daily at bedtime for 180 days, Starting Mon 5/20/2019, Until Sat 11/16/2019, Normal      buPROPion (WELLBUTRIN XL) 150 mg 24 hr tablet Take 3 tablets (450 mg total) by mouth daily for 180 days, Starting Mon 5/20/2019, Until Sat 11/16/2019, Normal      busPIRone (BUSPAR) 5 mg tablet Take 1 tablet (5 mg total) by mouth 2 (two) times a day for 180 days, Starting Mon 5/20/2019, Until Sat 11/16/2019, Normal      sertraline (ZOLOFT) 50 mg tablet Multiple Dosages:Starting Wed 9/25/2019, Last dose on Tue 10/1/2019, THEN Starting Wed 10/2/2019, Last dose on Sun 3/22/2020Take 0 5 tablets (25 mg total) by mouth daily for 7 days, THEN 1 tablet (50 mg total) daily  , Normal      zolpidem (AMBIEN) 10 mg tablet Take 1 tablet (10 mg total) by mouth daily at bedtime as needed for sleep To be filled on or after 10/19/19, Starting Sat 10/19/2019, Until Sun 2/16/2020, Normal           No discharge procedures on file      ED Provider  Electronically Signed by           Peace Mackey DO  11/21/19 7236

## 2019-12-05 ENCOUNTER — TRANSCRIBE ORDERS (OUTPATIENT)
Dept: ADMINISTRATIVE | Facility: HOSPITAL | Age: 54
End: 2019-12-05

## 2019-12-05 DIAGNOSIS — R68.81 EARLY SATIETY: Primary | ICD-10-CM

## 2019-12-05 DIAGNOSIS — R10.13 ABDOMINAL PAIN, EPIGASTRIC: Primary | ICD-10-CM

## 2019-12-05 DIAGNOSIS — R10.13 ABDOMINAL PAIN, EPIGASTRIC: ICD-10-CM

## 2019-12-12 ENCOUNTER — HOSPITAL ENCOUNTER (OUTPATIENT)
Dept: ULTRASOUND IMAGING | Facility: HOSPITAL | Age: 54
Discharge: HOME | End: 2019-12-12
Attending: INTERNAL MEDICINE
Payer: MEDICARE

## 2019-12-12 DIAGNOSIS — R10.13 ABDOMINAL PAIN, EPIGASTRIC: ICD-10-CM

## 2019-12-12 PROCEDURE — 76705 ECHO EXAM OF ABDOMEN: CPT

## 2019-12-13 ENCOUNTER — TRANSCRIBE ORDERS (OUTPATIENT)
Dept: LAB | Facility: CLINIC | Age: 54
End: 2019-12-13

## 2019-12-13 ENCOUNTER — APPOINTMENT (OUTPATIENT)
Dept: LAB | Facility: CLINIC | Age: 54
End: 2019-12-13
Payer: MEDICARE

## 2019-12-13 DIAGNOSIS — R10.13 ABDOMINAL PAIN, EPIGASTRIC: ICD-10-CM

## 2019-12-13 DIAGNOSIS — R10.13 ABDOMINAL PAIN, EPIGASTRIC: Primary | ICD-10-CM

## 2019-12-13 DIAGNOSIS — R19.4 FREQUENT BOWEL MOVEMENTS: ICD-10-CM

## 2019-12-13 PROCEDURE — 83993 ASSAY FOR CALPROTECTIN FECAL: CPT

## 2019-12-13 PROCEDURE — 87338 HPYLORI STOOL AG IA: CPT

## 2019-12-14 LAB — H PYLORI AG STL QL IA: NEGATIVE

## 2019-12-16 LAB — CALPROTECTIN STL-MCNT: 48 UG/G (ref 0–120)

## 2019-12-22 ENCOUNTER — HOSPITAL ENCOUNTER (OUTPATIENT)
Dept: NUCLEAR MEDICINE | Facility: HOSPITAL | Age: 54
Discharge: HOME | End: 2019-12-22
Attending: INTERNAL MEDICINE
Payer: MEDICARE

## 2019-12-22 DIAGNOSIS — R10.13 ABDOMINAL PAIN, EPIGASTRIC: ICD-10-CM

## 2019-12-22 DIAGNOSIS — R68.81 EARLY SATIETY: ICD-10-CM

## 2019-12-22 PROCEDURE — A9541 TC99M SULFUR COLLOID: HCPCS

## 2019-12-22 PROCEDURE — 78264 GASTRIC EMPTYING IMG STUDY: CPT

## 2020-02-11 DIAGNOSIS — G47.09 OTHER INSOMNIA: Primary | Chronic | ICD-10-CM

## 2020-02-11 RX ORDER — ZOLPIDEM TARTRATE 10 MG/1
10 TABLET ORAL
Qty: 30 TABLET | Refills: 3 | OUTPATIENT
Start: 2020-02-11 | End: 2020-06-10

## 2020-02-11 RX ORDER — ZOLPIDEM TARTRATE 10 MG/1
10 TABLET ORAL
Qty: 30 TABLET | Refills: 3 | Status: SHIPPED | OUTPATIENT
Start: 2020-02-15 | End: 2020-05-29 | Stop reason: SDUPTHER

## 2020-05-27 ENCOUNTER — DOCUMENTATION (OUTPATIENT)
Dept: PSYCHIATRY | Facility: CLINIC | Age: 55
End: 2020-05-27

## 2020-05-29 ENCOUNTER — TELEMEDICINE (OUTPATIENT)
Dept: PSYCHIATRY | Facility: CLINIC | Age: 55
End: 2020-05-29
Payer: MEDICARE

## 2020-05-29 VITALS — WEIGHT: 295 LBS | HEIGHT: 70 IN | BODY MASS INDEX: 42.23 KG/M2

## 2020-05-29 DIAGNOSIS — G47.09 OTHER INSOMNIA: Chronic | ICD-10-CM

## 2020-05-29 DIAGNOSIS — F33.2 MAJOR DEPRESSIVE DISORDER, RECURRENT, SEVERE WITHOUT PSYCHOTIC FEATURES (HCC): Primary | Chronic | ICD-10-CM

## 2020-05-29 DIAGNOSIS — F41.1 GAD (GENERALIZED ANXIETY DISORDER): Chronic | ICD-10-CM

## 2020-05-29 PROCEDURE — 90833 PSYTX W PT W E/M 30 MIN: CPT | Performed by: PSYCHIATRY & NEUROLOGY

## 2020-05-29 PROCEDURE — 99214 OFFICE O/P EST MOD 30 MIN: CPT | Performed by: PSYCHIATRY & NEUROLOGY

## 2020-05-29 RX ORDER — ZOLPIDEM TARTRATE 10 MG/1
10 TABLET ORAL
Qty: 30 TABLET | Refills: 3 | Status: SHIPPED | OUTPATIENT
Start: 2020-06-14 | End: 2020-10-12

## 2020-06-02 ENCOUNTER — TELEPHONE (OUTPATIENT)
Dept: PSYCHIATRY | Facility: CLINIC | Age: 55
End: 2020-06-02

## 2020-06-03 ENCOUNTER — HOSPITAL ENCOUNTER (EMERGENCY)
Facility: HOSPITAL | Age: 55
Discharge: HOME | End: 2020-06-03
Attending: EMERGENCY MEDICINE | Admitting: EMERGENCY MEDICINE
Payer: MEDICARE

## 2020-06-03 ENCOUNTER — APPOINTMENT (EMERGENCY)
Dept: CT IMAGING | Facility: HOSPITAL | Age: 55
End: 2020-06-03
Payer: MEDICARE

## 2020-06-03 VITALS
BODY MASS INDEX: 42.29 KG/M2 | OXYGEN SATURATION: 98 % | HEIGHT: 70 IN | HEART RATE: 78 BPM | WEIGHT: 295.42 LBS | RESPIRATION RATE: 16 BRPM | SYSTOLIC BLOOD PRESSURE: 125 MMHG | TEMPERATURE: 98.5 F | DIASTOLIC BLOOD PRESSURE: 77 MMHG

## 2020-06-03 DIAGNOSIS — G89.29 CHRONIC ABDOMINAL PAIN: ICD-10-CM

## 2020-06-03 DIAGNOSIS — R51.9 HEADACHE: Primary | ICD-10-CM

## 2020-06-03 DIAGNOSIS — R10.9 CHRONIC ABDOMINAL PAIN: ICD-10-CM

## 2020-06-03 DIAGNOSIS — R20.0 NUMBNESS: ICD-10-CM

## 2020-06-03 LAB
ALBUMIN SERPL BCP-MCNC: 3.8 G/DL (ref 3.5–5)
ALP SERPL-CCNC: 74 U/L (ref 46–116)
ALT SERPL W P-5'-P-CCNC: 32 U/L (ref 12–78)
ANION GAP SERPL CALCULATED.3IONS-SCNC: 9 MMOL/L (ref 4–13)
AST SERPL W P-5'-P-CCNC: 23 U/L (ref 5–45)
BASOPHILS # BLD AUTO: 0.05 THOUSANDS/ΜL (ref 0–0.1)
BASOPHILS NFR BLD AUTO: 1 % (ref 0–1)
BILIRUB SERPL-MCNC: 0.75 MG/DL (ref 0.2–1)
BILIRUB UR QL STRIP: NEGATIVE
BUN SERPL-MCNC: 16 MG/DL (ref 5–25)
CALCIUM SERPL-MCNC: 8.4 MG/DL (ref 8.3–10.1)
CHLORIDE SERPL-SCNC: 104 MMOL/L (ref 100–108)
CLARITY UR: CLEAR
CO2 SERPL-SCNC: 26 MMOL/L (ref 21–32)
COLOR UR: YELLOW
CREAT SERPL-MCNC: 1.08 MG/DL (ref 0.6–1.3)
D DIMER PPP FEU-MCNC: 0.28 UG/ML FEU
EOSINOPHIL # BLD AUTO: 0.1 THOUSAND/ΜL (ref 0–0.61)
EOSINOPHIL NFR BLD AUTO: 1 % (ref 0–6)
ERYTHROCYTE [DISTWIDTH] IN BLOOD BY AUTOMATED COUNT: 12.6 % (ref 11.6–15.1)
GFR SERPL CREATININE-BSD FRML MDRD: 78 ML/MIN/1.73SQ M
GLUCOSE SERPL-MCNC: 109 MG/DL (ref 65–140)
GLUCOSE UR STRIP-MCNC: NEGATIVE MG/DL
HCT VFR BLD AUTO: 46.2 % (ref 36.5–49.3)
HGB BLD-MCNC: 15.6 G/DL (ref 12–17)
HGB UR QL STRIP.AUTO: NEGATIVE
IMM GRANULOCYTES # BLD AUTO: 0.04 THOUSAND/UL (ref 0–0.2)
IMM GRANULOCYTES NFR BLD AUTO: 1 % (ref 0–2)
KETONES UR STRIP-MCNC: NEGATIVE MG/DL
LEUKOCYTE ESTERASE UR QL STRIP: NEGATIVE
LIPASE SERPL-CCNC: 75 U/L (ref 73–393)
LYMPHOCYTES # BLD AUTO: 1.73 THOUSANDS/ΜL (ref 0.6–4.47)
LYMPHOCYTES NFR BLD AUTO: 23 % (ref 14–44)
MCH RBC QN AUTO: 29.4 PG (ref 26.8–34.3)
MCHC RBC AUTO-ENTMCNC: 33.8 G/DL (ref 31.4–37.4)
MCV RBC AUTO: 87 FL (ref 82–98)
MONOCYTES # BLD AUTO: 0.6 THOUSAND/ΜL (ref 0.17–1.22)
MONOCYTES NFR BLD AUTO: 8 % (ref 4–12)
NEUTROPHILS # BLD AUTO: 4.88 THOUSANDS/ΜL (ref 1.85–7.62)
NEUTS SEG NFR BLD AUTO: 66 % (ref 43–75)
NITRITE UR QL STRIP: NEGATIVE
NRBC BLD AUTO-RTO: 0 /100 WBCS
PH UR STRIP.AUTO: 5.5 [PH] (ref 4.5–8)
PLATELET # BLD AUTO: 281 THOUSANDS/UL (ref 149–390)
PMV BLD AUTO: 9.5 FL (ref 8.9–12.7)
POTASSIUM SERPL-SCNC: 4.1 MMOL/L (ref 3.5–5.3)
PROT SERPL-MCNC: 7.5 G/DL (ref 6.4–8.2)
PROT UR STRIP-MCNC: NEGATIVE MG/DL
RBC # BLD AUTO: 5.3 MILLION/UL (ref 3.88–5.62)
SODIUM SERPL-SCNC: 139 MMOL/L (ref 136–145)
SP GR UR STRIP.AUTO: >=1.03 (ref 1–1.03)
UROBILINOGEN UR QL STRIP.AUTO: 0.2 E.U./DL
WBC # BLD AUTO: 7.4 THOUSAND/UL (ref 4.31–10.16)

## 2020-06-03 PROCEDURE — 96375 TX/PRO/DX INJ NEW DRUG ADDON: CPT

## 2020-06-03 PROCEDURE — 99284 EMERGENCY DEPT VISIT MOD MDM: CPT

## 2020-06-03 PROCEDURE — 83690 ASSAY OF LIPASE: CPT | Performed by: PHYSICIAN ASSISTANT

## 2020-06-03 PROCEDURE — 85379 FIBRIN DEGRADATION QUANT: CPT | Performed by: PHYSICIAN ASSISTANT

## 2020-06-03 PROCEDURE — 70450 CT HEAD/BRAIN W/O DYE: CPT

## 2020-06-03 PROCEDURE — 80053 COMPREHEN METABOLIC PANEL: CPT | Performed by: PHYSICIAN ASSISTANT

## 2020-06-03 PROCEDURE — 81003 URINALYSIS AUTO W/O SCOPE: CPT

## 2020-06-03 PROCEDURE — 96361 HYDRATE IV INFUSION ADD-ON: CPT

## 2020-06-03 PROCEDURE — 99284 EMERGENCY DEPT VISIT MOD MDM: CPT | Performed by: PHYSICIAN ASSISTANT

## 2020-06-03 PROCEDURE — 85025 COMPLETE CBC W/AUTO DIFF WBC: CPT | Performed by: PHYSICIAN ASSISTANT

## 2020-06-03 PROCEDURE — 96374 THER/PROPH/DIAG INJ IV PUSH: CPT

## 2020-06-03 PROCEDURE — 36415 COLL VENOUS BLD VENIPUNCTURE: CPT | Performed by: PHYSICIAN ASSISTANT

## 2020-06-03 RX ORDER — ACETAMINOPHEN 325 MG/1
650 TABLET ORAL ONCE
Status: COMPLETED | OUTPATIENT
Start: 2020-06-03 | End: 2020-06-03

## 2020-06-03 RX ORDER — DEXAMETHASONE SODIUM PHOSPHATE 4 MG/ML
10 INJECTION, SOLUTION INTRA-ARTICULAR; INTRALESIONAL; INTRAMUSCULAR; INTRAVENOUS; SOFT TISSUE ONCE
Status: COMPLETED | OUTPATIENT
Start: 2020-06-03 | End: 2020-06-03

## 2020-06-03 RX ORDER — KETOROLAC TROMETHAMINE 30 MG/ML
15 INJECTION, SOLUTION INTRAMUSCULAR; INTRAVENOUS ONCE
Status: COMPLETED | OUTPATIENT
Start: 2020-06-03 | End: 2020-06-03

## 2020-06-03 RX ADMIN — SODIUM CHLORIDE 1000 ML: 0.9 INJECTION, SOLUTION INTRAVENOUS at 14:42

## 2020-06-03 RX ADMIN — KETOROLAC TROMETHAMINE 15 MG: 30 INJECTION, SOLUTION INTRAMUSCULAR at 14:42

## 2020-06-03 RX ADMIN — DEXAMETHASONE SODIUM PHOSPHATE 10 MG: 4 INJECTION, SOLUTION INTRAMUSCULAR; INTRAVENOUS at 14:44

## 2020-06-03 RX ADMIN — ACETAMINOPHEN 650 MG: 325 TABLET, FILM COATED ORAL at 12:11

## 2020-06-29 ENCOUNTER — OFFICE VISIT (OUTPATIENT)
Dept: ENDOCRINOLOGY | Facility: CLINIC | Age: 55
End: 2020-06-29
Payer: MEDICARE

## 2020-06-29 VITALS
TEMPERATURE: 98.5 F | HEART RATE: 80 BPM | BODY MASS INDEX: 42.95 KG/M2 | SYSTOLIC BLOOD PRESSURE: 138 MMHG | HEIGHT: 70 IN | DIASTOLIC BLOOD PRESSURE: 78 MMHG | WEIGHT: 300 LBS

## 2020-06-29 DIAGNOSIS — D35.01 ADRENAL ADENOMA, RIGHT: ICD-10-CM

## 2020-06-29 DIAGNOSIS — E66.01 OBESITY, MORBID, BMI 40.0-49.9 (HCC): ICD-10-CM

## 2020-06-29 DIAGNOSIS — R43.2 LOSS OF TASTE: Primary | ICD-10-CM

## 2020-06-29 DIAGNOSIS — R10.11 RUQ ABDOMINAL PAIN: ICD-10-CM

## 2020-06-29 PROCEDURE — 99204 OFFICE O/P NEW MOD 45 MIN: CPT | Performed by: INTERNAL MEDICINE

## 2020-06-29 RX ORDER — DEXAMETHASONE 1 MG
TABLET ORAL
Qty: 1 TABLET | Refills: 0 | Status: SHIPPED | OUTPATIENT
Start: 2020-06-29 | End: 2020-11-09 | Stop reason: ALTCHOICE

## 2020-07-01 ENCOUNTER — TELEPHONE (OUTPATIENT)
Dept: PSYCHIATRY | Facility: CLINIC | Age: 55
End: 2020-07-01

## 2020-07-01 NOTE — TELEPHONE ENCOUNTER
Karolina Alva left a message regarding trying to set up an appointment, but he was not available today as he has an appointment today with another doctor  There was a message left yesterday morning for an afternoon appointment yesterday   (See Encounter 06/10/20 )

## 2020-07-02 NOTE — TELEPHONE ENCOUNTER
MACKENZIE Glasgow with the available appointment; asked him to call the main number if he can or can't make it; he can call the nursing number as well and gave same

## 2020-07-06 ENCOUNTER — TELEMEDICINE (OUTPATIENT)
Dept: PSYCHIATRY | Facility: CLINIC | Age: 55
End: 2020-07-06
Payer: MEDICARE

## 2020-07-06 VITALS — BODY MASS INDEX: 42.23 KG/M2 | WEIGHT: 295 LBS | HEIGHT: 70 IN

## 2020-07-06 DIAGNOSIS — F41.1 GAD (GENERALIZED ANXIETY DISORDER): Chronic | ICD-10-CM

## 2020-07-06 DIAGNOSIS — G47.09 OTHER INSOMNIA: Chronic | ICD-10-CM

## 2020-07-06 DIAGNOSIS — F33.2 MAJOR DEPRESSIVE DISORDER, RECURRENT, SEVERE WITHOUT PSYCHOTIC FEATURES (HCC): Primary | Chronic | ICD-10-CM

## 2020-07-06 PROCEDURE — 99213 OFFICE O/P EST LOW 20 MIN: CPT | Performed by: PSYCHIATRY & NEUROLOGY

## 2020-07-06 PROCEDURE — 90833 PSYTX W PT W E/M 30 MIN: CPT | Performed by: PSYCHIATRY & NEUROLOGY

## 2020-07-06 RX ORDER — VENLAFAXINE HYDROCHLORIDE 37.5 MG/1
37.5 CAPSULE, EXTENDED RELEASE ORAL DAILY
Qty: 30 CAPSULE | Refills: 3 | Status: SHIPPED | OUTPATIENT
Start: 2020-07-06 | End: 2020-11-09 | Stop reason: SDUPTHER

## 2020-07-06 NOTE — TELEPHONE ENCOUNTER
Miguel Bajwa [facility] on nursing line on Friday 7/3/2020 @ 11:14 AM  He said he will take the appointment for Monday (today)  at 3:30 PM with Dr Desi Sanchez  He would like a phone visit as he had trouble last time connecting through the computer  Clerical staff please place on schedule for 3:30 PM with Dr Desi Sanchez and follow up with Miguel Bajwa  Thanks!

## 2020-07-06 NOTE — PSYCH
Virtual Regular Visit      Assessment/Plan:    Problem List Items Addressed This Visit        Other    Major depressive disorder, recurrent, severe without psychotic features ([facility] [address] ) - Primary (Chronic)    Relevant Medications    venlafaxine (EFFEXOR-XR) 37 5 mg 24 hr capsule    ALYSHA (generalized anxiety disorder) (Chronic)    Relevant Medications    venlafaxine (EFFEXOR-XR) 37 5 mg 24 hr capsule    Other insomnia (Chronic)          Reason for visit is   Chief Complaint   Patient presents with    Medication Management    Follow-up    Virtual Regular Visit      Encounter provider Pina Rodríguez MD    Provider located at [address] [place] [id number]      Recent Visits  Date Type Provider Dept   07/01/20 Telephone Fritz Aguilera 18 recent visits within past 7 days and meeting all other requirements     Today's Visits  Date Type Provider Dept   07/06/20 Telemedicine MD Fritz Bray 18 today's visits and meeting all other requirements     Future Appointments  No visits were found meeting these conditions  Showing future appointments within next 150 days and meeting all other requirements      It was my intent to perform this visit via video technology but the patient was not able to do a video connection so the visit was completed via audio telephone only  The patient was identified by name and date of birth  Rabia England was informed that this is a telemedicine visit and that the visit is being conducted through telephone  My office door was closed  No one else was in the room  He acknowledged consent and understanding of privacy and security of the telephone platform  The patient has agreed to participate and understands they can discontinue the visit at any time  Patient is aware this is a billable service       Subjective:    Rabia England is a 50 y o  male with a history of Major Depressive Disorder and Generalized Anxiety Disorder  He continues to experience ongoing symptoms since the last visit  He still feels depressed, frustrated with increased headaches recently and recent neck pain "I have all those medical problems going on now"  He is planning to follow up with a neurologist  He states that anxiety symptoms are relatively well controlled otherwise  He has not filled Fetzima due to high co-pay  He denies any suicidal ideation, intent or plan at present; denies any homicidal ideation, intent or plan at present  He has no auditory hallucinations, denies any visual hallucinations, has no delusional thoughts  He denies any side effects from current psychiatric medications  PHQ-9 is 16 today (slightly increased from 15 at the last visit)  [place] HPI ROS Appetite Changes and Sleep:     He reports normal sleep, decreased appetite, recent weight loss (5 lbs), low energy    Review Of Systems:      Constitutional low energy and recent weight loss (5 lbs)   ENT loss of taste   Cardiovascular negative   Respiratory negative   Gastrointestinal negative   Genitourinary negative   Musculoskeletal neck pain   Integumentary negative   Neurological headache   Endocrine negative   Other Symptoms none, all other systems are negative       Past Psychiatric History: (unchanged information from previous note copied and updated)     Past Inpatient Psychiatric Treatment:   No history of past inpatient psychiatric admissions  Past Outpatient Psychiatric Treatment:    In outpatient treatment at [address] [place] [address] E since 2/2017    Past Suicide Attempts: no  Past Violent Behavior: no  Past Psychiatric Medication Trials: Lexapro, Zoloft, Wellbutrin XL, Trazodone, Abilify, Ambien, Cymbalta, Buspar and Melatonin    Traumatic History: (unchanged information from previous note copied and updated)    Abuse: no history of sexual abuse, no history of physical abuse  Other Traumatic Events: none     Substance Abuse History:    Social History     Substance and Sexual Activity   Alcohol Use Yes    Frequency: Monthly or less    Drinks per session: 1 or 2    Binge frequency: Never    Comment: Social alcohol use     Social History     Substance and Sexual Activity   Drug Use No       Social History:    Social History     Socioeconomic History    Marital status: Legally      Spouse name: Not on file    Number of children: 1    Years of education: some college    Highest education level: Some college, no degree   Occupational History    Occupation: on disability   Social Needs    Financial resource strain: Not hard at all   [place] insecurity:     Worry: Never true     Inability: Never true    Transportation needs:     Medical: No     Non-medical: No   Tobacco Use    Smoking status: Never Smoker    Smokeless tobacco: Never Used   Substance and Sexual Activity    Alcohol use: Yes     Frequency: Monthly or less     Drinks per session: 1 or 2     Binge frequency: Never     Comment: Social alcohol use    Drug use: No    Sexual activity: Never   Lifestyle    Physical activity:     Days per week: 1 day     Minutes per session: 30 min    Stress:  To some extent   Relationships    Social connections:     Talks on phone: Never     Gets together: Never     Attends Mandaeism service: Never     Active member of club or organization: No     Attends meetings of clubs or organizations: Never     Relationship status:     Intimate partner violence:     Fear of current or ex partner: No     Emotionally abused: No     Physically abused: No     Forced sexual activity: No   Other Topics Concern    Not on file   Social History Narrative    Education: some college    Learning Disabilities: none    Marital History:     Children: 1 adult son    Living Arrangement: lives in home with parents    Occupational History: on disability, 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    Functioning Relationships: good support system    Legal History: none     History: None        Most recent tobacco use screenin2018       Family Psychiatric History:     Family History   Problem Relation Age of Onset   Herminio Mancera Cancer Mother     Depression Sister     Suicide Attempts Sister        History Review: The following portions of the patient's history were reviewed and updated as appropriate: allergies, current medications, past family history, past medical history, past social history, past surgical history and problem list         Past Medical History:   Diagnosis Date    Adrenal adenoma     Arthritis     Carpal tunnel syndrome     Chronic back pain     Chronic pain disorder     Cubital tunnel syndrome     Fatigue     Fatty liver     Folliculitis     Hemorrhoid     Liver lesion     Lumbago     Morbid obesity ([address] )     Myofascial pain syndrome     Scalp lesion     Sleep apnea     CPAP dependance    Sleep difficulties     Thickening of wall of gallbladder     Tinea capitis     Ulnar 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     Umbilical hernia     Vitamin D deficiency        Past Surgical History:   Procedure Laterality Date    CARPAL TUNNEL RELEASE Bilateral     CYST REMOVAL      Scalp    EYE MUSCLE SURGERY Bilateral     as a child for amblyopia    HEMORROIDECTOMY      HERNIA REPAIR      KNEE ARTHROSCOPY      PATELLA SURGERY Right     scraped out scar tissue per pt     UT COLONOSCOPY FLX DX W/COLLJ SPEC WHEN PFRMD N/A 2/10/2016    Procedure: COLONOSCOPY;  Surgeon: Sal Thomas MD;  [facility]: [facility]; Service: Colorectal    UT EGD TRANSORAL BIOPSY SINGLE/MULTIPLE N/A 2017    Procedure: ESOPHAGOGASTRODUODENOSCOPY (EGD) with bx;  Surgeon: Karthik Valencia MD;  [facility]: [facility];   Service: Bariatrics    UT EXC SKIN BENIG 0 6-1 CM REMAINDR BODY N/A 2016    Procedure: EXCISION OF SCALP LESION;  Surgeon: Makenzie Higginbotham Lulú Correa MD;  [facility];  Service: General    IN HEMORRHOIDECTOMY,INT/EXT, 2+ COLUMNS/GROUPS N/A 2/12/2016    Procedure: HEMORRHOIDECTOMY EXCISION, EUA, Anal fistulotomy;  Surgeon: Moshe Lee MD;  [facility];  Service: Colorectal    UMBILICAL HERNIA REPAIR         Current Outpatient Medications   Medication Sig Dispense Refill    zolpidem (Ambien) 10 mg tablet Take 1 tablet (10 mg total) by mouth daily at bedtime as needed for sleep To be filled on or after 6/14/20 30 tablet 3    dexamethasone (DECADRON) 1 mg tablet Take 1 tablet by mouth at 11PM and have cortisol lab checked at 8AM the next morning (Patient not taking: Reported on 7/6/2020) 1 tablet 0    venlafaxine (EFFEXOR-XR) 37 5 mg 24 hr capsule Take 1 capsule (37 5 mg total) by mouth daily 30 capsule 3     No current facility-administered medications for this visit           Allergies   Allergen Reactions    Penicillins Rash       Exam    Vitals:    07/06/20 1536   Weight: 134 kg (295 lb)   Height: 5' 10" (1 778 m)       Mental Status Evaluation:    Appearance unable to assess today due to virtual visit   Behavior cooperative, unable to assess further due to virtual visit   Speech normal rate, normal volume, normal pitch   Mood depressed, anxious   Affect unable to assess today due to virtual visit   Thought Processes organized, goal directed   Associations intact associations   Thought Content no overt delusions   Perceptual Disturbances: no auditory hallucinations, no visual hallucinations   Abnormal Thoughts  Risk Potential Suicidal ideation - None  Homicidal ideation - None  Potential for aggression - No   Orientation oriented to person, place, time/date and situation   Memory recent and remote memory grossly intact   Consciousness alert and awake   Attention Span Concentration Span attention span and concentration appear shorter than expected for age   Intellect appears to be of average intelligence   Insight intact Judgement intact   Muscle Strength and  Gait unable to assess today due to virtual visit   Motor activity unable to assess today due to virtual visit   Language no difficulty naming common objects, no difficulty repeating a phrase, unable to assess writing today due to virtual visit   Fund of Knowledge adequate knowledge of current events  adequate fund of knowledge regarding past history  adequate fund of knowledge regarding vocabulary    Pain severe   Pain Scale 8       Laboratory Results: I have personally reviewed all pertinent laboratory/tests results    Recent Labs (last 2 months):    Admission on 06/03/2020, Discharged on 06/03/2020   Component Date Value    WBC 06/03/2020 7 40     RBC 06/03/2020 5 30     Hemoglobin 06/03/2020 15 6     Hematocrit 06/03/2020 46 2     MCV 06/03/2020 87     MCH 06/03/2020 29 4     MCHC 06/03/2020 33 8     RDW 06/03/2020 12 6     MPV 06/03/2020 9 5     Platelets 97/76/3288 281     nRBC 06/03/2020 0     Neutrophils Relative 06/03/2020 66     Immat GRANS % 06/03/2020 1     Lymphocytes Relative 06/03/2020 23     Monocytes Relative 06/03/2020 8     Eosinophils Relative 06/03/2020 1     Basophils Relative 06/03/2020 1     Neutrophils Absolute 06/03/2020 4 88     Immature Grans Absolute 06/03/2020 0 04     Lymphocytes Absolute 06/03/2020 1 73     Monocytes Absolute 06/03/2020 0 60     Eosinophils Absolute 06/03/2020 0 10     Basophils Absolute 06/03/2020 0 05     Sodium 06/03/2020 139     Potassium 06/03/2020 4 1     Chloride 06/03/2020 104     CO2 06/03/2020 26     ANION GAP 06/03/2020 9     BUN 06/03/2020 16     Creatinine 06/03/2020 1 08     Glucose 06/03/2020 109     Calcium 06/03/2020 8 4     AST 06/03/2020 23     ALT 06/03/2020 32     Alkaline Phosphatase 06/03/2020 74     Total Protein 06/03/2020 7 5     Albumin 06/03/2020 3 8     Total Bilirubin 06/03/2020 0 75     eGFR 06/03/2020 78     Lipase 06/03/2020 75     D-Dimer, Quant 06/03/2020 0 28     Color, UA 06/03/2020 Yellow     Clarity, UA 06/03/2020 Clear     pH, UA 06/03/2020 5 5     Leukocytes, UA 06/03/2020 Negative     Nitrite, UA 06/03/2020 Negative     Protein, UA 06/03/2020 Negative     Glucose, UA 06/03/2020 Negative     Ketones, UA 06/03/2020 Negative     Urobilinogen, UA 06/03/2020 0 2     Bilirubin, UA 06/03/2020 Negative     Blood, UA 06/03/2020 Negative     Specific Gravity, UA 06/03/2020 >=1 030        Suicide/Homicide Risk Assessment:    Risk of Harm to Self:  Demographic risk factors include: , , male, age: over 48 or older  Historical Risk Factors include: chronic depressive symptoms, chronic anxiety symptoms  Recent Specific Risk Factors include: diagnosis of depression, current depressive symptoms, current anxiety symptoms  Protective Factors: no current suicidal ideation, being a parent, compliant with mental health treatment, stable living environment, supportive parents  Weapons: gun  The following steps have been taken to ensure weapons are properly secured: locked  Based on today's assessment, Alba Ladd presents the following risk of harm to self: low    Risk of Harm to Others: The following ratings are based on assessment at the time of the interview  Based on today's assessment, Alba Ladd presents the following risk of harm to others: none    The following interventions are recommended: no intervention changes needed     Assessment/Plan:       Diagnoses and all orders for this visit:    Major depressive disorder, recurrent, severe without psychotic features ([facility] [address] )  -     venlafaxine (EFFEXOR-XR) 37 5 mg 24 hr capsule; Take 1 capsule (37 5 mg total) by mouth daily    ALYSHA (generalized anxiety disorder)  -     venlafaxine (EFFEXOR-XR) 37 5 mg 24 hr capsule;  Take 1 capsule (37 5 mg total) by mouth daily    Other insomnia          Treatment Recommendations/Precautions:    Continue Ambien 10 mg at bedtime PRN to help with insomnia  Start Effexor XR 37 5 mg daily to improve depressive symptoms - unable to afford Fetzima  Medication management every 2 months  Follows with family physician for chronic pain and gastrointestinal issues  Aware of 24 hour and weekend coverage for urgent situations accessed by calling [facility] main practice number    Medications Risks/Benefits      Risks, Benefits And Possible Side Effects Of Medications:    Risks, benefits, and possible side effects of medications explained to Dilma including risk of suicidality and serotonin syndrome related to treatment with antidepressants and risk of impaired next-day mental alertness, complex sleep-related behavior and dependence related to treatment with hypnotic medications  He verbalizes understanding and agreement for treatment  Controlled Medication Discussion:     Dilma has been filling controlled prescriptions on time as prescribed according to [address] [place] [address] [place] Monitoring Program    Psychotherapy Provided:     Individual psychotherapy provided: Yes  Counseling was provided during the session today for 16 minutes  Medications, treatment progress and treatment plan reviewed with Dilma  Medication changes discussed with Dilma  Medication education provided to Dilma  Goals discussed during in session: maintain improvement in anxiety and help with depression  Discussed with Dilma coping with health issues and chronic mental illness  Coping strategies including exercising, increasing energy and increasing motivation reviewed with Dilma  Supportive therapy provided  Treatment Plan:    Completed and signed during the session: Not applicable - Treatment Plan not due at this session    As a result of this visit, I have not referred the patient for further respiratory evaluation      I spent 30 minutes with patient today in which greater than 50% of the time was spent in counseling/coordination of care regarding coping with health issues      VIRTUAL VISIT DISCLAIMER    [place] acknowledges that he has consented to an online visit or consultation  He understands that the online visit is based solely on information provided by him, and that, in the absence of a face-to-face physical evaluation by the physician, the diagnosis he receives is both limited and provisional in terms of accuracy and completeness  This is not intended to replace a full medical face-to-face evaluation by the physician  Rosedl Lalajesusita understands and accepts these terms

## 2020-07-09 ENCOUNTER — TRANSCRIBE ORDERS (OUTPATIENT)
Dept: ADMINISTRATIVE | Facility: HOSPITAL | Age: 55
End: 2020-07-09

## 2020-07-09 ENCOUNTER — TELEPHONE (OUTPATIENT)
Dept: FAMILY MEDICINE CLINIC | Facility: CLINIC | Age: 55
End: 2020-07-09

## 2020-07-09 DIAGNOSIS — R51.9 NONINTRACTABLE HEADACHE, UNSPECIFIED CHRONICITY PATTERN, UNSPECIFIED HEADACHE TYPE: Primary | ICD-10-CM

## 2020-07-09 DIAGNOSIS — R10.11 ABDOMINAL PAIN, RIGHT UPPER QUADRANT: Primary | ICD-10-CM

## 2020-07-09 NOTE — TELEPHONE ENCOUNTER
Needs a dr  To   Referral for  [facility], ph # [phone]  Fax # [phone]    They won't set up appt   Until they get the referral    Patient ph # [phone]

## 2020-07-15 ENCOUNTER — HOSPITAL ENCOUNTER (OUTPATIENT)
Dept: RADIOLOGY | Age: 55
End: 2020-07-15
Attending: INTERNAL MEDICINE
Payer: MEDICARE

## 2020-07-15 ENCOUNTER — HOSPITAL ENCOUNTER (OUTPATIENT)
Dept: RADIOLOGY | Age: 55
Discharge: HOME | End: 2020-07-15
Payer: MEDICARE

## 2020-07-15 DIAGNOSIS — R43.2 LOSS OF TASTE: ICD-10-CM

## 2020-07-15 DIAGNOSIS — D35.01 ADRENAL ADENOMA, RIGHT: ICD-10-CM

## 2020-07-15 DIAGNOSIS — R43.8 BAD ORAL TASTE: ICD-10-CM

## 2020-07-15 PROCEDURE — 70486 CT MAXILLOFACIAL W/O DYE: CPT

## 2020-07-15 PROCEDURE — 74150 CT ABDOMEN W/O CONTRAST: CPT

## 2020-07-21 ENCOUNTER — TELEPHONE (OUTPATIENT)
Dept: ENDOCRINOLOGY | Facility: CLINIC | Age: 55
End: 2020-07-21

## 2020-07-21 NOTE — TELEPHONE ENCOUNTER
Please let patient know his CT scan results    -his liver looks okay except a simple hepatic cyst---I'd follow up with gastroenterology his stomach doctor if he is still having the pain on the right upper belly    -the adrenal gland adenoma is stable in size 1 3cm on the right which is good

## 2020-07-22 ENCOUNTER — HOSPITAL ENCOUNTER (OUTPATIENT)
Dept: RADIOLOGY | Facility: HOSPITAL | Age: 55
Discharge: HOME | End: 2020-07-22
Payer: MEDICARE

## 2020-07-22 DIAGNOSIS — R10.11 ABDOMINAL PAIN, RIGHT UPPER QUADRANT: ICD-10-CM

## 2020-07-22 PROCEDURE — 76705 ECHO EXAM OF ABDOMEN: CPT

## 2020-07-22 PROCEDURE — 76981 USE PARENCHYMA: CPT

## 2020-07-22 NOTE — TELEPHONE ENCOUNTER
Spoke with patient and reviewed CT results and recommendations to see gastroenterology    He understood

## 2020-08-28 NOTE — PROGRESS NOTES
[address] [facility] Consult  PATIENT:  Abby Colorado  MRN:  [id number]  :  1965  DATE OF SERVICE:  2020  REFERRED BY: Mamta Olivares MD  PMD: Mc Sánchez MD    Assessment/Plan:     Abby Colorado is a very pleasant 50 y o  male with a past medical history that includes anxiety, depression, bilateral carpal tunnel syndrome 2012 s/p surgery, bilateral cubital tunnel syndrome, median nerve neuritis, chronic paresthesias, chronic pain disorder, chronic back pain, degenerative arthritis, morbid obesity, myofascial pain syndrome, insomnia, sleep apnea, vitamin-D deficiency, intercostal neuralgia referred here for evaluation of headache  My initial evaluation 2020    Acute non intractable tension-type headache - now resolved  Patient denies a history of significant headaches or migraines although he would occasionally have some unilateral retro-orbital headaches that he only mention briefly later in the visit  Was notable to him was in 2020 head 2 weeks straight of headaches that brought him into the emergency department  He reports the pain was diffuse all over an achy  Mild for the most part and occasionally more significant  He denies typical associated migrainous features  - as of 2020: In 2020, 2 weeks straight of headache, improved spontaneously and now only 1 mild headache in the past month     We discussed unknown etiology of his transient headaches earlier this summer  Certainly a good sign that they resolved on their own  Do not sound migrainous in nature  No other new or concerning features      Workup:  - noncontrast head CT 2020: No acute intracranial abnormality   - MRI brain with without contrast 2015: Normal MRI examination of the brain   No pathologic enhancement     Preventative:  - we discussed headache hygiene and lifestyle factors that may improve headaches  - no prescription preventative indicated at this time for headaches, although he was started on venlafaxine 37 5 mg 3-4 weeks ago which can help with chronic pain and headaches    Abortive:  - discussed not taking over-the-counter or prescription pain medications more than 3 days per week to prevent medication overuse/rebound headache  - headaches spontaneously resolved    Chronic Paresthesias  History of bilateral CTS status post surgery 2012, bilateral cubital tunnel syndrome, chronic paresthesias, possible uncontrolled mood disorder causing paresthesias  Patient reports chronic for years, worse over the past 8 months  Paresthesias go back and forth between 1 side of the body on the other in all 4 extremities  Worse if he sits in a chair for long time, worse at night  Appears to have been a problem in the past as EMG was ordered in 2016 and not completed  -     EMG 1 Upper/1 Lower Neuropathy; Future  -     Vitamin B12; Future  -     Vitamin D 25 hydroxy; Future    Vitamin D deficiency  -     Vitamin D 25 hydroxy; Future      Medications I plan to avoid due to old EKG appearing to show prolonged QTC:  Hydroxyzine, diuretics, ondansetron, metoclopramide, donepezil, olanzapine, quetiapine, tricyclic antidepressants, (amitriptyline, protriptyline, nortriptyline), citalopram/celexa, escitalopram/Lexapro, fluoxetine, trazodone  (This is an incomplete list compiled to remind myself not to use these drugs which I otherwise may for various neurologic reasons )        Patient instructions      Follow-up with primary care provider regarding prolonged QTC interval on last EKG to see if they think further evaluation is needed or not  Since this may affect which medications you can have  EMG/ nerve conduction study ordered    Labs ordered    Headache/migraine treatment:   Abortive medications (for immediate treatment of a headache):    It is ok to take ibuprofen, acetaminophen or naproxen (Advil, Tylenol,  Aleve, Excedrin) if they help your headaches you should limit these to No more than 3 times a week to avoid medication overuse/rebound headaches  Prescription preventive medications for headaches/migraines   (to take every day to help prevent headaches - not to take at the time of headache):  [x] continue management of mood through psych    Currently on 37 5 mg venlafaxine, defer to Psychiatry regarding increase although it may help with symptoms    Self-Monitoring:  [x] Headache calendar  Each day radha a number from 0-10 indicating if there was a headache and how bad it was  This can be used to monitor gradual improvement and is helpful to make medication adjustments  You can do this on paper or there is an ELISEO for a smart phone called "Migraine e Diary"  Lifestyle Recommendations:  [x] SLEEP - Maintain a regular sleep schedule: Adults need at least 7-8 hours of uninterrupted a night  Maintain good sleep hygiene:  Going to bed and waking up at consistent times, avoiding excessive daytime naps, avoiding caffeinated beverages in the evening, avoid excessive stimulation in the evening and generally using bed primarily for sleeping  One hour before bedtime would recommend turning lights down lower, decreasing your activity (may read quietly, listen to music at a low volume)  When you get into bed, should eliminate all technology (no texting, emailing, playing with your phone, iPad or tablet in bed)  [x] HYDRATION - Maintain good hydration  Drink  2L of fluid a day (4 typical small water bottles)  [x] DIET - Maintain good nutrition  In particular don't skip meals and try and eat healthy balanced meals regularly  [x] TRIGGERS - Look for other triggers and avoid them: Limit caffeine to 1-2 cups a day or less  Avoid dietary triggers that you have noticed bring on your headaches (this could include aged cheese, peanuts, MSG, aspartame and nitrates)    [x] EXERCISE - physical exercise as we all know is good for you in many ways, and not only is good for your heart, but also is beneficial for your mental health, cognitive health and  chronic pain/headaches  I would encourage at the least 5 days of physical exercise weekly for at least 30 minutes  Education and Follow-up  [x] Please call with any questions or concerns  Of course if any new concerning symptoms go to the emergency department  [x] Follow up with one of our  Physician assistants after EMG          CC:   We had the pleasure of evaluating Bev Joseph in neurological consultation today  Bev Joseph is a 50 y o    right handed male who presents today for evaluation of headaches  History obtained from patient as well as available medical record review  History of Present Illness:   Current medical illnesses or past medical history include anxiety, depression, bilateral carpal tunnel syndrome 2012 s/p surgery, bilateral cubital tunnel syndrome, median nerve neuritis, chronic paresthesias, chronic pain disorder, chronic back pain, degenerative arthritis, morbid obesity, myofascial pain syndrome, insomnia, sleep apnea, vitamin-D deficiency, intercostal neuralgia    - went to ED [place] 3, 2020 for a couple weeks of headaches, neck soreness, back pain     Since July and August multiple health issues    Paresthesias in hands and feet for years, worse over past 8 months  - equal and goes back and forth   - worse if sits in chair for a long time  - worse at night   - CTS in 2012 s/p surgery  - EMG ordered in 2016     Headaches started at what age? Does not recall much headaches or migraines in the past  How often do the headaches occur?  - as of 8/31/2020: In June 2020, 2 weeks straight of headache, improved and now only 1 headache in the past month     How long do the headaches last? Last all day, worse in am, lessen as day went on and then worse again at night   Are you ever headache free? Yes    Aura? without aura      Where is your headache located and pain quality?   - diffuse, achy     What is the intensity of pain?  Average: 3-4/10, worst 9-10/10  Associated symptoms:   [x] Nausea       [] Vomiting        [] Diarrhea  [] Photophobia     []Phonophobia - chronically misophonia that he thinks related to depression  [] Blurred vision   [] Light-headed or dizzy     [] Tinnitus     [] Red ear      [] Ptosis      [] Facial droop  [] Lacrimation  [] Nasal congestion/rhinorrhea   [] Flushing of face  [] Change in pupil size    Things that make the headache worse? No specific movements, any movement     Headache triggers:  Unknown     Have you seen someone else for headaches or pain? Yes, PCP, ED  Have you ever had any Brain imaging? yes    What medications do you take or have you taken for your headaches?    ABORTIVE:    OTC medications have been ineffective       PREVENTIVE:       For mood:  Venlafaxine 37 5 started 3-4 weeks ago   For sleep:  Ambien    Past for mood:  Wellbutrin  Zoloft  Abilify  Buspar  Citalopram      Alternative therapies used in the past for headaches? no    LIFESTYLE  Sleep   - averages: LI on CPAP - 10-12, ambien     Water: 2L per day  Caffeine: 1/2 cup per day    Mood:  Anxiety, depression - follows with psychiatry every 2 months  - counseling in the past, not currently, currently not well controlled, but psychiatry managing     The following portions of the patient's history were reviewed and updated as appropriate: allergies, current medications, past family history, past medical history, past social history, past surgical history and problem list     Pertinent family history:  Family history of headaches:  migraine headaches in mother  Any family history of aneurysms  No    Pertinent social history:  Work: on disability for depression and physical - chronic back pain   Education: 2-3 years of college   Lives with mom and dad     Illicit Drugs: denies  Alcohol/tobacco: Denies tobacco use, alcohol intake: once a week a couple sips of beer    Past Medical History:     Past Medical History:   Diagnosis Date    Adrenal adenoma     Arthritis     Carpal tunnel syndrome     Chronic back pain     Chronic pain disorder     Cubital tunnel syndrome     Fatigue     Fatty liver     Folliculitis     Hemorrhoid     Liver lesion     Lumbago     Morbid obesity (HCC)     Myofascial pain syndrome     Scalp lesion     Sleep apnea     CPAP dependance    Sleep difficulties     Thickening of wall of gallbladder     Tinea capitis     Ulnar 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     Umbilical hernia     Vitamin D deficiency        Patient Active Problem List   Diagnosis    Anal fissure    Hemorrhoids without complication    Major depressive disorder, recurrent, severe without psychotic features ([facility] [address] )    ALYSHA (generalized anxiety disorder)    Other insomnia    Adrenal adenoma    Bilateral carpal tunnel syndrome    Carpal tunnel syndrome of right wrist    Chronic right-sided thoracic back pain    Cubital tunnel syndrome of both upper extremities    CMC arthritis, thumb, degenerative    Intercostal neuralgia    Low back pain    Median nerve neuritis    Morbid obesity (HCC)    Myofascial pain syndrome    Sleep apnea    Thoracic compression fracture (HCC)    Tinea capitis    Ulnar 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 of left upper extremity    Umbilical hernia    Vitamin D deficiency       Medications:      Current Outpatient Medications   Medication Sig Dispense Refill    venlafaxine (EFFEXOR-XR) 37 5 mg 24 hr capsule Take 1 capsule (37 5 mg total) by mouth daily 30 capsule 3    zolpidem (Ambien) 10 mg tablet Take 1 tablet (10 mg total) by mouth daily at bedtime as needed for sleep To be filled on or after 6/14/20 30 tablet 3    dexamethasone (DECADRON) 1 mg tablet Take 1 tablet by mouth at 11PM and have cortisol lab checked at 8AM the next morning (Patient not taking: Reported on 7/6/2020) 1 tablet 0     No current facility-administered medications for this visit  Allergies:       Allergies   Allergen Reactions    Penicillins Rash Family History:     Family History   Problem Relation Age of Onset   Mavis Cousin Cancer Mother     Diabetes type II Father     Depression Sister     Suicide Attempts Sister     No Known Problems Sister        Social History:       Social History     Socioeconomic History    Marital status: Legally      Spouse name: Not on file    Number of children: 1    Years of education: some college    Highest education level: Some college, no degree   Occupational History    Occupation: on disability   Social Needs    Financial resource strain: Not hard at all   [place] insecurity     Worry: Never true     Inability: Never true    Transportation needs     Medical: No     Non-medical: No   Tobacco Use    Smoking status: Never Smoker    Smokeless tobacco: Never Used   Substance and Sexual Activity    Alcohol use: Yes     Frequency: Monthly or less     Drinks per session: 1 or 2     Binge frequency: Never     Comment: Social alcohol use    Drug use: No    Sexual activity: Never   Lifestyle    Physical activity     Days per week: 1 day     Minutes per session: 30 min    Stress:  To some extent   Relationships    Social connections     Talks on phone: Never     Gets together: Never     Attends [employer] service: Never     Active member of club or organization: No     Attends meetings of clubs or organizations: Never     Relationship status:     Intimate partner violence     Fear of current or ex partner: No     Emotionally abused: No     Physically abused: No     Forced sexual activity: No   Other Topics Concern    Not on file   Social History Narrative    Education: some college    Learning Disabilities: none    Marital History:     Children: 1 adult son    Living Arrangement: lives in home with parents    Occupational History: on disability, 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    [address]: good support system    Legal History: none     History: None        Most recent tobacco use screenin2018         Objective:     Exam limited by pandemic/social distancing    Physical Exam:                                                                 Vitals:            Constitutional:    /70 (BP Location: Left arm, Patient Position: Sitting, Cuff Size: Large)   Pulse 80   Temp 98 1 °F (36 7 °C) (Tympanic)   Ht 5' 10" (1 778 m)   Wt 133 kg (293 lb)   BMI 42 04 kg/m²   BP Readings from Last 3 Encounters:   20 126/70   20 138/78   20 125/77     Pulse Readings from Last 3 Encounters:   20 80   20 80   20 78         Well developed, well nourished, well groomed  No dysmorphic features, cooperative       HEENT:  Normocephalic atraumatic  No meningismus  Chest:  Respirations appear regular and unlabored  Musculoskeletal:  Appears to have Full range of motion  (see below under neurologic exam for evaluation of motor function and gait)   Skin:  Appears warm and dry, not diaphoretic  No apparent birthmarks or stigmata of neurocutaneous disease  Psychiatric:  Normal behavior and appropriate affect, flat, slow        Neurological Examination:     Mental status/cognitive function:   Recent and remote memory intact  Attention span and concentration as well as fund of knowledge are appropriate for age  Normal language and spontaneous speech  Cranial Nerves:  II-visual fields appear full  Unable to do fundus exam due to pandemic/social distancing  III, IV, VI-Pupils appear equal, round  Extraocular movements were full and conjugate without nystagmus  V-facial sensation symmetric  VII-facial expression symmetric, intact forehead wrinkle, strong eye closure  VIII-hearing grossly intact bilaterally   IX, X-palate elevation symmetric, no dysarthria  XI-shoulder shrug strength intact    XII-tongue protrusion midline  Motor Exam: symmetric bulk and tone throughout, no pronator drift  no atrophy, fasciculations or abnormal movements noted  Sensory: they report grossly poor all extremities  Further evaluation of this specific issue had to be deferred due to pandemic, as well as time as visit was for headaches not paresthesias  Reflexes:  Deferred due to pandemic/social distancing  Coordination:no apparent dysmetria, ataxia or tremor noted  Gait: steady casual gait  Pertinent lab results:   06/03/2020:  CMP and CBC unremarkable  10/08/2019 TSH normal    EKG 02/05/2016:  Normal sinus rhythm, normal EKG, rate 82,     Imaging:   I have personally reviewed imaging and radiology read   - noncontrast head CT 06/03/2020: No acute intracranial abnormality   - MRI brain with without contrast 12/16/2015: Normal MRI examination of the brain   No pathologic enhancement        Review of Systems:   ROS obtained by medical assistant Personally reviewed and updated if indicated  Review of Systems   Constitutional: Negative  Negative for appetite change and fever  HENT: Negative  Negative for hearing loss, tinnitus, trouble swallowing and voice change  Eyes: Negative  Negative for photophobia and pain  Respiratory: Negative  Negative for shortness of breath  Cardiovascular: Negative  Negative for palpitations  Gastrointestinal: Negative  Negative for nausea and vomiting  Endocrine: Negative  Negative for cold intolerance  Genitourinary: Negative  Negative for dysuria, frequency and urgency  Musculoskeletal: Negative  Negative for myalgias and neck pain  Skin: Negative  Negative for rash  Neurological: Positive for numbness (Hands/Feet)  Negative for dizziness, tremors, seizures, syncope, facial asymmetry, speech difficulty, weakness, light-headedness and headaches  Hematological: Negative  Does not bruise/bleed easily  Psychiatric/Behavioral: Negative  Negative for confusion, hallucinations and sleep disturbance         I have spent over 45 minutes with Patient  today in which greater than 50% of this time was spent in counseling/coordination of care regarding Diagnostic results, Prognosis, Risks and benefits of tx options, Intructions for management, Patient education, Importance of tx compliance, Risk factor reductions and Impressions        Author:  Halley Bond MD 8/31/2020 5:44 PM

## 2020-08-31 ENCOUNTER — CONSULT (OUTPATIENT)
Dept: NEUROLOGY | Facility: CLINIC | Age: 55
End: 2020-08-31
Payer: MEDICARE

## 2020-08-31 VITALS
HEIGHT: 70 IN | TEMPERATURE: 98.1 F | BODY MASS INDEX: 41.95 KG/M2 | SYSTOLIC BLOOD PRESSURE: 126 MMHG | WEIGHT: 293 LBS | DIASTOLIC BLOOD PRESSURE: 70 MMHG | HEART RATE: 80 BPM

## 2020-08-31 DIAGNOSIS — R20.2 PARESTHESIAS: ICD-10-CM

## 2020-08-31 DIAGNOSIS — G44.209 ACUTE NON INTRACTABLE TENSION-TYPE HEADACHE: Primary | ICD-10-CM

## 2020-08-31 DIAGNOSIS — E55.9 VITAMIN D DEFICIENCY: ICD-10-CM

## 2020-08-31 PROCEDURE — 99205 OFFICE O/P NEW HI 60 MIN: CPT | Performed by: PSYCHIATRY & NEUROLOGY

## 2020-08-31 NOTE — PROGRESS NOTES
Review of Systems   Constitutional: Negative  Negative for appetite change and fever  HENT: Negative  Negative for hearing loss, tinnitus, trouble swallowing and voice change  Eyes: Negative  Negative for photophobia and pain  Respiratory: Negative  Negative for shortness of breath  Cardiovascular: Negative  Negative for palpitations  Gastrointestinal: Negative  Negative for nausea and vomiting  Endocrine: Negative  Negative for cold intolerance  Genitourinary: Negative  Negative for dysuria, frequency and urgency  Musculoskeletal: Negative  Negative for myalgias and neck pain  Skin: Negative  Negative for rash  Neurological: Positive for numbness (Hands/Feet)  Negative for dizziness, tremors, seizures, syncope, facial asymmetry, speech difficulty, weakness, light-headedness and headaches  Hematological: Negative  Does not bruise/bleed easily  Psychiatric/Behavioral: Negative  Negative for confusion, hallucinations and sleep disturbance

## 2020-08-31 NOTE — PATIENT INSTRUCTIONS
Follow-up with primary care provider regarding prolonged QTC interval on last EKG to see if they think further evaluation is needed or not  Since this may affect which medications you can have  EMG/ nerve conduction study ordered    Headache/migraine treatment:   Abortive medications (for immediate treatment of a headache): It is ok to take ibuprofen, acetaminophen or naproxen (Advil, Tylenol,  Aleve, Excedrin) if they help your headaches you should limit these to No more than 3 times a week to avoid medication overuse/rebound headaches  Prescription preventive medications for headaches/migraines   (to take every day to help prevent headaches - not to take at the time of headache):  [x] continue management of mood through psych    Currently on 37 5 mg venlafaxine, defer to Psychiatry regarding increase although it may help with symptoms    Self-Monitoring:  [x] Headache calendar  Each day radha a number from 0-10 indicating if there was a headache and how bad it was  This can be used to monitor gradual improvement and is helpful to make medication adjustments  You can do this on paper or there is an ELISEO for a smart phone called "Migraine e Diary"  Lifestyle Recommendations:  [x] SLEEP - Maintain a regular sleep schedule: Adults need at least 7-8 hours of uninterrupted a night  Maintain good sleep hygiene:  Going to bed and waking up at consistent times, avoiding excessive daytime naps, avoiding caffeinated beverages in the evening, avoid excessive stimulation in the evening and generally using bed primarily for sleeping  One hour before bedtime would recommend turning lights down lower, decreasing your activity (may read quietly, listen to music at a low volume)  When you get into bed, should eliminate all technology (no texting, emailing, playing with your phone, iPad or tablet in bed)  [x] HYDRATION - Maintain good hydration    Drink  2L of fluid a day (4 typical small water bottles)  [x] DIET - Maintain good nutrition  In particular don't skip meals and try and eat healthy balanced meals regularly  [x] TRIGGERS - Look for other triggers and avoid them: Limit caffeine to 1-2 cups a day or less  Avoid dietary triggers that you have noticed bring on your headaches (this could include aged cheese, peanuts, MSG, aspartame and nitrates)  [x] EXERCISE - physical exercise as we all know is good for you in many ways, and not only is good for your heart, but also is beneficial for your mental health, cognitive health and  chronic pain/headaches  I would encourage at the least 5 days of physical exercise weekly for at least 30 minutes  Education and Follow-up  [x] Please call with any questions or concerns  Of course if any new concerning symptoms go to the emergency department    [x] Follow up with one of our  Physician assistants after EMG

## 2020-09-10 ENCOUNTER — TELEPHONE (OUTPATIENT)
Dept: NEUROLOGY | Facility: CLINIC | Age: 55
End: 2020-09-10

## 2020-09-10 NOTE — TELEPHONE ENCOUNTER
Patient is currently scheduled with Princess Aldridge on 9/25/20 in the [facility] office and was to be seen after EMG  EMG was cancelled on 9/1/20, and Rescheduled for October  Left message for patient to call the office  Not sure if he still wants to keep this appointment with Princess Aldridge if having sx or reschedule for after EMG    Please discuss and document

## 2020-09-16 NOTE — TELEPHONE ENCOUNTER
Patient returning Pam's call  He is agreeable to r/s his f/u after the EMG  Please assist   Thank you

## 2020-09-21 NOTE — TELEPHONE ENCOUNTER
Left message for patient to call the office to schedule the follow up after the EMG that he has scheduled in October

## 2020-10-12 DIAGNOSIS — G47.09 OTHER INSOMNIA: Primary | Chronic | ICD-10-CM

## 2020-10-12 RX ORDER — ZOLPIDEM TARTRATE 10 MG/1
10 TABLET ORAL
Qty: 30 TABLET | Refills: 0 | Status: SHIPPED | OUTPATIENT
Start: 2020-10-12 | End: 2020-11-09 | Stop reason: SDUPTHER

## 2020-10-27 ENCOUNTER — PROCEDURE VISIT (OUTPATIENT)
Dept: NEUROLOGY | Facility: CLINIC | Age: 55
End: 2020-10-27
Payer: MEDICARE

## 2020-10-27 DIAGNOSIS — R20.2 PARESTHESIAS: ICD-10-CM

## 2020-10-27 PROCEDURE — 95912 NRV CNDJ TEST 11-12 STUDIES: CPT | Performed by: PHYSICAL MEDICINE & REHABILITATION

## 2020-10-28 ENCOUNTER — OFFICE VISIT (OUTPATIENT)
Dept: URGENT CARE | Age: 55
End: 2020-10-28
Payer: MEDICARE

## 2020-10-28 VITALS
DIASTOLIC BLOOD PRESSURE: 73 MMHG | OXYGEN SATURATION: 97 % | WEIGHT: 300 LBS | HEIGHT: 70 IN | RESPIRATION RATE: 18 BRPM | HEART RATE: 82 BPM | TEMPERATURE: 97.3 F | SYSTOLIC BLOOD PRESSURE: 137 MMHG | BODY MASS INDEX: 42.95 KG/M2

## 2020-10-28 DIAGNOSIS — L98.9 LESION OF LOWER EXTREMITY: Primary | ICD-10-CM

## 2020-10-28 PROCEDURE — 99213 OFFICE O/P EST LOW 20 MIN: CPT | Performed by: NURSE PRACTITIONER

## 2020-10-28 PROCEDURE — G0463 HOSPITAL OUTPT CLINIC VISIT: HCPCS | Performed by: NURSE PRACTITIONER

## 2020-11-09 ENCOUNTER — OFFICE VISIT (OUTPATIENT)
Dept: PSYCHIATRY | Facility: CLINIC | Age: 55
End: 2020-11-09
Payer: MEDICARE

## 2020-11-09 ENCOUNTER — TELEPHONE (OUTPATIENT)
Dept: PSYCHIATRY | Facility: CLINIC | Age: 55
End: 2020-11-09

## 2020-11-09 VITALS — WEIGHT: 304 LBS | HEIGHT: 70 IN | BODY MASS INDEX: 43.52 KG/M2

## 2020-11-09 DIAGNOSIS — G47.09 OTHER INSOMNIA: Chronic | ICD-10-CM

## 2020-11-09 DIAGNOSIS — F41.1 GAD (GENERALIZED ANXIETY DISORDER): Chronic | ICD-10-CM

## 2020-11-09 DIAGNOSIS — F33.2 MAJOR DEPRESSIVE DISORDER, RECURRENT, SEVERE WITHOUT PSYCHOTIC FEATURES (HCC): Primary | Chronic | ICD-10-CM

## 2020-11-09 PROCEDURE — 99214 OFFICE O/P EST MOD 30 MIN: CPT | Performed by: PSYCHIATRY & NEUROLOGY

## 2020-11-09 PROCEDURE — 90833 PSYTX W PT W E/M 30 MIN: CPT | Performed by: PSYCHIATRY & NEUROLOGY

## 2020-11-09 RX ORDER — GABAPENTIN 100 MG/1
100 CAPSULE ORAL 3 TIMES DAILY
Qty: 90 CAPSULE | Refills: 3 | Status: SHIPPED | OUTPATIENT
Start: 2020-11-09 | End: 2021-03-23 | Stop reason: SDUPTHER

## 2020-11-09 RX ORDER — ZOLPIDEM TARTRATE 10 MG/1
10 TABLET ORAL
Qty: 30 TABLET | Refills: 3 | Status: SHIPPED | OUTPATIENT
Start: 2020-11-11 | End: 2021-03-15 | Stop reason: SDUPTHER

## 2020-11-09 RX ORDER — VENLAFAXINE HYDROCHLORIDE 75 MG/1
75 CAPSULE, EXTENDED RELEASE ORAL DAILY
Qty: 30 CAPSULE | Refills: 3 | Status: SHIPPED | OUTPATIENT
Start: 2020-11-09 | End: 2021-03-23 | Stop reason: SDUPTHER

## 2020-12-04 ENCOUNTER — TELEPHONE (OUTPATIENT)
Dept: FAMILY MEDICINE CLINIC | Facility: CLINIC | Age: 55
End: 2020-12-04

## 2020-12-04 ENCOUNTER — OFFICE VISIT (OUTPATIENT)
Dept: NEUROLOGY | Facility: CLINIC | Age: 55
End: 2020-12-04
Payer: MEDICARE

## 2020-12-04 VITALS
WEIGHT: 312.2 LBS | HEART RATE: 87 BPM | DIASTOLIC BLOOD PRESSURE: 72 MMHG | BODY MASS INDEX: 44.8 KG/M2 | SYSTOLIC BLOOD PRESSURE: 110 MMHG

## 2020-12-04 DIAGNOSIS — G56.01 CARPAL TUNNEL SYNDROME OF RIGHT WRIST: Primary | ICD-10-CM

## 2020-12-04 DIAGNOSIS — U07.1 COVID-19: Primary | ICD-10-CM

## 2020-12-04 DIAGNOSIS — E55.9 VITAMIN D DEFICIENCY: ICD-10-CM

## 2020-12-04 DIAGNOSIS — G60.9 HEREDITARY AND IDIOPATHIC NEUROPATHY, UNSPECIFIED: ICD-10-CM

## 2020-12-04 DIAGNOSIS — G62.9 NEUROPATHY: ICD-10-CM

## 2020-12-04 DIAGNOSIS — G44.209 TENSION HEADACHE: ICD-10-CM

## 2020-12-04 PROCEDURE — 99214 OFFICE O/P EST MOD 30 MIN: CPT | Performed by: PHYSICIAN ASSISTANT

## 2020-12-05 ENCOUNTER — LAB (OUTPATIENT)
Dept: LAB | Facility: CLINIC | Age: 55
End: 2020-12-05
Payer: MEDICARE

## 2020-12-05 DIAGNOSIS — U07.1 COVID-19: ICD-10-CM

## 2020-12-05 DIAGNOSIS — G62.9 NEUROPATHY: ICD-10-CM

## 2020-12-05 DIAGNOSIS — R43.2 LOSS OF TASTE: ICD-10-CM

## 2020-12-05 DIAGNOSIS — G60.9 HEREDITARY AND IDIOPATHIC NEUROPATHY, UNSPECIFIED: ICD-10-CM

## 2020-12-05 DIAGNOSIS — D35.01 ADRENAL ADENOMA, RIGHT: ICD-10-CM

## 2020-12-05 DIAGNOSIS — E55.9 VITAMIN D DEFICIENCY: ICD-10-CM

## 2020-12-05 LAB
25(OH)D3 SERPL-MCNC: 11 NG/ML (ref 30–100)
CORTIS AM PEAK SERPL-MCNC: 13 UG/DL (ref 4.2–22.4)
EST. AVERAGE GLUCOSE BLD GHB EST-MCNC: 123 MG/DL
HBA1C MFR BLD: 5.9 %
T4 FREE SERPL-MCNC: 1.2 NG/DL (ref 0.76–1.46)
TSH SERPL DL<=0.05 MIU/L-ACNC: 2.72 UIU/ML (ref 0.36–3.74)
VIT B12 SERPL-MCNC: 436 PG/ML (ref 100–900)

## 2020-12-05 PROCEDURE — 36415 COLL VENOUS BLD VENIPUNCTURE: CPT

## 2020-12-05 PROCEDURE — 84443 ASSAY THYROID STIM HORMONE: CPT

## 2020-12-05 PROCEDURE — 84165 PROTEIN E-PHORESIS SERUM: CPT | Performed by: PATHOLOGY

## 2020-12-05 PROCEDURE — 84439 ASSAY OF FREE THYROXINE: CPT

## 2020-12-05 PROCEDURE — 86235 NUCLEAR ANTIGEN ANTIBODY: CPT

## 2020-12-05 PROCEDURE — 86618 LYME DISEASE ANTIBODY: CPT

## 2020-12-05 PROCEDURE — 82533 TOTAL CORTISOL: CPT

## 2020-12-05 PROCEDURE — 84165 PROTEIN E-PHORESIS SERUM: CPT

## 2020-12-05 PROCEDURE — 83036 HEMOGLOBIN GLYCOSYLATED A1C: CPT

## 2020-12-05 PROCEDURE — 82306 VITAMIN D 25 HYDROXY: CPT

## 2020-12-05 PROCEDURE — 86769 SARS-COV-2 COVID-19 ANTIBODY: CPT

## 2020-12-05 PROCEDURE — 82607 VITAMIN B-12: CPT

## 2020-12-06 LAB
SARS-COV-2 IGG SERPL QL IA: NEGATIVE
SARS-COV-2 IGG SERPL QL IA: NORMAL

## 2020-12-07 ENCOUNTER — TELEPHONE (OUTPATIENT)
Dept: NEUROLOGY | Facility: CLINIC | Age: 55
End: 2020-12-07

## 2020-12-07 DIAGNOSIS — E55.9 VITAMIN D DEFICIENCY: Primary | ICD-10-CM

## 2020-12-07 LAB
ENA SS-A AB SER-ACNC: <0.2 AI (ref 0–0.9)
ENA SS-B AB SER-ACNC: <0.2 AI (ref 0–0.9)

## 2020-12-07 RX ORDER — ERGOCALCIFEROL 1.25 MG/1
50000 CAPSULE ORAL WEEKLY
Qty: 12 CAPSULE | Refills: 0 | Status: SHIPPED | OUTPATIENT
Start: 2020-12-07 | End: 2021-03-10

## 2020-12-08 ENCOUNTER — TELEPHONE (OUTPATIENT)
Dept: ENDOCRINOLOGY | Facility: CLINIC | Age: 55
End: 2020-12-08

## 2020-12-08 ENCOUNTER — TELEPHONE (OUTPATIENT)
Dept: NEUROLOGY | Facility: CLINIC | Age: 55
End: 2020-12-08

## 2020-12-08 DIAGNOSIS — D35.01 ADRENAL ADENOMA, RIGHT: Primary | ICD-10-CM

## 2020-12-08 LAB
ALBUMIN SERPL ELPH-MCNC: 4.17 G/DL (ref 3.5–5)
ALBUMIN SERPL ELPH-MCNC: 62.3 % (ref 52–65)
ALPHA1 GLOB SERPL ELPH-MCNC: 0.27 G/DL (ref 0.1–0.4)
ALPHA1 GLOB SERPL ELPH-MCNC: 4.1 % (ref 2.5–5)
ALPHA2 GLOB SERPL ELPH-MCNC: 0.66 G/DL (ref 0.4–1.2)
ALPHA2 GLOB SERPL ELPH-MCNC: 9.8 % (ref 7–13)
B BURGDOR IGG+IGM SER-ACNC: 6
BETA GLOB ABNORMAL SERPL ELPH-MCNC: 0.4 G/DL (ref 0.4–0.8)
BETA1 GLOB SERPL ELPH-MCNC: 5.9 % (ref 5–13)
BETA2 GLOB SERPL ELPH-MCNC: 5 % (ref 2–8)
BETA2+GAMMA GLOB SERPL ELPH-MCNC: 0.34 G/DL (ref 0.2–0.5)
GAMMA GLOB ABNORMAL SERPL ELPH-MCNC: 0.86 G/DL (ref 0.5–1.6)
GAMMA GLOB SERPL ELPH-MCNC: 12.9 % (ref 12–22)
IGG/ALB SER: 1.65 {RATIO} (ref 1.1–1.8)
PROT PATTERN SERPL ELPH-IMP: NORMAL
PROT SERPL-MCNC: 6.7 G/DL (ref 6.4–8.2)

## 2021-01-11 DIAGNOSIS — G47.09 OTHER INSOMNIA: Chronic | ICD-10-CM

## 2021-01-11 DIAGNOSIS — F33.2 MAJOR DEPRESSIVE DISORDER, RECURRENT, SEVERE WITHOUT PSYCHOTIC FEATURES (HCC): Chronic | ICD-10-CM

## 2021-01-11 DIAGNOSIS — F41.1 GAD (GENERALIZED ANXIETY DISORDER): Chronic | ICD-10-CM

## 2021-01-11 RX ORDER — ZOLPIDEM TARTRATE 10 MG/1
10 TABLET ORAL
Qty: 30 TABLET | Refills: 3 | OUTPATIENT
Start: 2021-01-11 | End: 2021-05-11

## 2021-01-11 RX ORDER — GABAPENTIN 100 MG/1
100 CAPSULE ORAL 3 TIMES DAILY
Qty: 90 CAPSULE | Refills: 3 | OUTPATIENT
Start: 2021-01-11 | End: 2021-05-11

## 2021-01-11 RX ORDER — VENLAFAXINE HYDROCHLORIDE 75 MG/1
75 CAPSULE, EXTENDED RELEASE ORAL DAILY
Qty: 30 CAPSULE | Refills: 3 | OUTPATIENT
Start: 2021-01-11 | End: 2021-05-11

## 2021-01-12 RX ORDER — VENLAFAXINE HYDROCHLORIDE 75 MG/1
75 CAPSULE, EXTENDED RELEASE ORAL DAILY
Qty: 30 CAPSULE | Refills: 3 | OUTPATIENT
Start: 2021-01-12 | End: 2021-05-12

## 2021-01-12 RX ORDER — ZOLPIDEM TARTRATE 10 MG/1
10 TABLET ORAL
Qty: 30 TABLET | Refills: 3 | OUTPATIENT
Start: 2021-01-12 | End: 2021-05-12

## 2021-01-12 RX ORDER — GABAPENTIN 100 MG/1
100 CAPSULE ORAL 3 TIMES DAILY
Qty: 90 CAPSULE | Refills: 3 | OUTPATIENT
Start: 2021-01-12 | End: 2021-05-12

## 2021-01-12 NOTE — TELEPHONE ENCOUNTER
Patient called regarding his medication  States he did not realize he missed his appointment because he did not get the normal notification he was used to  I did explain that [facility] automated system does not call, but we do make reminder calls and Amanda Fleming left message on 1/8/21  Scheduled follow up in next available for 3/11/21, but patient is concerned because he is out of his medication  Pharmacy was updated yesterday in original message  If medication sent, patient asked for FD to notify

## 2021-01-14 NOTE — TELEPHONE ENCOUNTER
Magnus Dodd left 2 messages this afternoon a about needing his pharmacy updated and his zolpidem sent to [facility], [facility]; his other medications he can get at [facility] where the prescriptions had been sent  In Carlito's second message, he was irritable and upset that he called 3 days ago, his prescription for zolpidem wasn't sent and he hasn't slept for 3 nights  I spoke with the pharmacist at [facility]  Zolpidem can be transferred to [facility], as long as it was not the first fill of that particular prescription  He did wonder why the patient was transferring it however  I spoke with the pharmacist at [facility] and the pharmacy where it is to be filled needs to call [facility] for the remaining refills  I spoke with Magnus Dodd  I acknowledged his messages left for nursing and let him know I was not part of any previous calls he had made   I reviewed the above information and that I had just spoke with both pharmacists who told me the prescription could be transferred at [facility] request  If Magnus Dodd continues to have trouble with his zolpidem prescription, he should call nursing and I would assist

## 2021-02-03 ENCOUNTER — CONSULT (OUTPATIENT)
Dept: SURGERY | Facility: CLINIC | Age: 56
End: 2021-02-03
Payer: MEDICARE

## 2021-02-03 VITALS
DIASTOLIC BLOOD PRESSURE: 86 MMHG | HEIGHT: 70 IN | WEIGHT: 308 LBS | SYSTOLIC BLOOD PRESSURE: 144 MMHG | BODY MASS INDEX: 44.09 KG/M2 | HEART RATE: 96 BPM | RESPIRATION RATE: 18 BRPM | TEMPERATURE: 98 F

## 2021-02-03 DIAGNOSIS — R10.11 RIGHT UPPER QUADRANT PAIN: Primary | ICD-10-CM

## 2021-02-03 DIAGNOSIS — K42.9 UMBILICAL HERNIA: ICD-10-CM

## 2021-02-03 PROCEDURE — 99204 OFFICE O/P NEW MOD 45 MIN: CPT | Performed by: SURGERY

## 2021-02-03 NOTE — PROGRESS NOTES
Assessment/Plan:  I reviewed his operative notes going back to 2014  He had partial omentectomy and suture repair of umbilical hernia in 0623  Patient has had multiple CT scans, ultrasound of the abdomen and gastric emptying study which have all been normal   The last CT scan of the abdomen did not include the umbilical region  Clinically he does have recurrence of his umbilical hernia  His symptoms however are indicated above biliary pathology  I told him that I am going to order a HIDA scan with CCK  If the HIDA scan is positive then we will go ahead and do the cholecystectomy along with recurrent umbilical hernia repair  In case the HIDA scan is negative then he would be a candidate just for umbilical hernia repair  He verbalized understanding  I again reassured him that the loss of taste and smell are not related to his hernia  He verbalized understanding  No problem-specific Assessment & Plan notes found for this encounter  Diagnoses and all orders for this visit:    Right upper quadrant pain    Umbilical hernia  -     NM hepatobiliary; Future          Subjective:      Patient ID: Brea Duff is a 50 y o  male  45-year-old male patient was referred to my office for a  2nd opinion regarding multiple complaints  He says he has been having right upper quadrant pain  At the same time he feels that his hernia which was fixed in 2014 by  Dr Rue Cooks  Has come back  He also tells me that he has lost taste and smell sensation  He does mention that this has happened in 2019 prior to the pandemic  He tells me that he has been tested for COVID antibodies and was found to be negative  He has no other 1st line symptoms of COVID        The following portions of the patient's history were reviewed and updated as appropriate: allergies, current medications, past family history, past medical history, past social history, past surgical history and problem list     Review of Systems   Constitutional: Negative  HENT: Negative  Eyes: Negative  Respiratory: Negative  Cardiovascular: Negative  Gastrointestinal: Positive for abdominal pain  Negative for anal bleeding, blood in stool, constipation, diarrhea, nausea, rectal pain and vomiting  Endocrine: Negative  Genitourinary: Negative  Musculoskeletal: Negative  Skin: Negative  Allergic/Immunologic: Negative  Neurological: Negative  Hematological: Negative  Psychiatric/Behavioral: Negative  Objective:      /86 (BP Location: Right arm, Patient Position: Sitting, Cuff Size: Adult)   Pulse 96   Temp 98 °F (36 7 °C)   Resp 18   Ht 5' 10" (1 778 m)   Wt (!) 140 kg (308 lb)   BMI 44 19 kg/m²          Physical Exam  Vitals signs reviewed  Constitutional:       Appearance: Normal appearance  He is obese  HENT:      Head: Normocephalic and atraumatic  Nose: Nose normal       Mouth/Throat:      Mouth: Mucous membranes are moist    Eyes:      Pupils: Pupils are equal, round, and reactive to light  Neck:      Musculoskeletal: Normal range of motion and neck supple  Cardiovascular:      Rate and Rhythm: Normal rate and regular rhythm  Pulses: Normal pulses  Heart sounds: Normal heart sounds  Pulmonary:      Effort: Pulmonary effort is normal       Breath sounds: Normal breath sounds  Abdominal:      General: Bowel sounds are normal  There is no distension  Palpations: Abdomen is soft  There is no mass  Tenderness: There is no abdominal tenderness  Hernia: A hernia (He has a reducible recurrent umbilical hernia) is present  Musculoskeletal:      Comments: He has thickening of the tendon sheath of   Flexor tendon of right 3rd digit at the level of the distal palmar crease  Neurological:      General: No focal deficit present     Psychiatric:         Mood and Affect: Mood normal          Behavior: Behavior normal

## 2021-02-04 PROBLEM — R73.03 PREDIABETES: Status: ACTIVE | Noted: 2021-02-04

## 2021-02-04 PROBLEM — G56.10 MEDIAN NERVE NEURITIS: Status: RESOLVED | Noted: 2017-05-09 | Resolved: 2021-02-04

## 2021-02-04 NOTE — PROGRESS NOTES
Assessment/Plan:    No problem-specific Assessment & Plan notes found for this encounter  Diagnoses and all orders for this visit:    Loss of taste  Chronic   CT head negative  CT sinuses showed only mild mucosal thickening and septal deviation  Saw ENT in consultation  ? etiology  Vitamin D deficiency  -     Vitamin D 25 hydroxy; Future  Reviewed recent labs  His vitamin D level was low at 11  He is on high dose d  Gave him rx to have this repeated when he completes high dose therapy  Neuropathy  Reviewed all labs, neurology consultation note and EMG report  This is chronic issue  Advised patient that sometimes there is no identifiable etiology of peripheral neuropathy  Can be caused by impaired fasting glucose so did encourage diet and exercise to lose weight as some studies have shown regeneration of nerve fibers with weight loss  Advised he f/u with neurology regarding discussion about increasing his gabapentin since they were planning to discuss with his psychiatrist    I reassured him that this symptoms have nothing to do with his circulation  Prediabetes  Reviewed recent labs  His A1c was 5 9   Encouraged diet and exercise   Morbid obesity (HCC)  Discussed importance of weight loss  He has been to weight management in past    Prefers to try on his own  Major depressive disorder, recurrent, severe without psychotic features ([facility] )  Follows with psychiatry for this  Other orders  -     aspirin 325 mg tablet; Take 325 mg by mouth every 6 (six) hours            Subjective:      Patient ID: Marguerite Perez is a 50 y o  male with multiple chronic medical problems as noted below who is here today for acute visit  Is complaining of pain in his legs, loss of taste and abdominal pain  The leg pain is burning in nature and is in both feet  Has similar symptoms in both hands       In review of chart in previous medical record, [facility], patient has had "'pains in hands and feet" dating back to visit with Dr Jose Elias Yepez on 2/18/20  He recently saw neurology (Dr Juvencio Dyer) on 12/4/20 in consultation for complaint of these paresthesias in extremities and headaches  He had emg/ncv which showed right sided median neuropathy as well as evidence of a mild sensory neuropathy  according to neurology note, etiology of this mild sensory neuropathy is unclear  They ordered some additional lab studies including B12, SPEP, Lyme, Sjogren's  A TSH and A1c were ordered by another provider  His A1c was in prediabetes range at 5 9  his vitamin d level was low at 11  B12 low normal at 436  He was started on high dose vitamin d by neurology and it was recommended that he start b12 supplementation  Neurology's plan was to consult with his psychiatrist regarding increase of his gabapentin that is being prescribed by psych  Unclear whether this has happened  Patient is continuing to take the 100 mg dose  He is here today because he is worried that the pain and paresthesias may be coming from a circulatory issue in his hands and feet  States that at times will notice some swelling in his knees  knees do not hurt  He has no weakness in extremities  No low back pain  His loss of taste has also been present for the last year and a a half  He did mention this to Dr Jose Elias Yepez at his visit in February of last year  He was referred to ENT at time of that visit  He has had CT of head and CT of sinuses  He denies any head injury  Has no loss of sense of smell  Third chronic issue today is abdominal pain  He states that pain is across his entire abdomen  He will also get some pain in shoulder blades after eating  There is no nausea, vomiting, diarrhea, constipation or weight loss  His abdominal pain has been worked up extensively by GI (CT of abdomen, ultrasound of right upper quadrant, EGD and colonoscopy) and saw general surgery in consultation on 2/3/21   HIDA scan was ordered       HPI    The following portions of the patient's history were reviewed and updated as appropriate:   He  has a past medical history of Adrenal adenoma, Arthritis, Carpal tunnel syndrome, Chronic back pain, Chronic pain disorder, Cubital tunnel syndrome, Fatigue, Fatty liver, Folliculitis, Hemorrhoid, Liver lesion, Lumbago, Morbid obesity (HCC), Myofascial pain syndrome, Neuropathy, Scalp lesion, Sleep apnea, Sleep difficulties, Thickening of wall of gallbladder, Tinea capitis, Ulnar 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, Umbilical hernia, and Vitamin D deficiency  He  has a past surgical history that includes Patella surgery (Right); Carpal tunnel release (Bilateral); Umbilical hernia repair; Eye muscle surgery (Bilateral); pr exc skin benig 0 6-1 cm remaindr body (N/A, 12/8/2016); pr hemorrhoidectomy,int/ext, 2+ columns/groups (N/A, 2/12/2016); pr colonoscopy flx dx w/collj spec when pfrmd (N/A, 2/10/2016); Knee arthroscopy; pr egd transoral biopsy single/multiple (N/A, 8/23/2017); Cyst Removal; Hemorroidectomy; and Hernia repair  He  reports that he has never smoked  He has never used smokeless tobacco  He reports current alcohol use  He reports that he does not use drugs  Current Outpatient Medications on File Prior to Visit   Medication Sig    aspirin 325 mg tablet Take 325 mg by mouth every 6 (six) hours    ergocalciferol (VITAMIN D2) 50,000 units Take 1 capsule (50,000 Units total) by mouth once a week    gabapentin (NEURONTIN) 100 mg capsule Take 1 capsule (100 mg total) by mouth 3 (three) times a day    venlafaxine (EFFEXOR-XR) 75 mg 24 hr capsule Take 1 capsule (75 mg total) by mouth daily    zolpidem (AMBIEN) 10 mg tablet Take 1 tablet (10 mg total) by mouth daily at bedtime as needed for sleep To be filled on or after 11/11/20     No current facility-administered medications on file prior to visit  He is allergic to penicillins       Review of Systems      Objective:      /90 (BP Location: Right arm, Patient Position: Sitting, Cuff Size: Large)   Pulse 92   Temp 98 °F (36 7 °C) (Temporal)   Resp 18   Ht 5' 10" (1 778 m)   Wt (!) 142 kg (312 lb)   SpO2 96%   BMI 44 77 kg/m²          Physical Exam  Vitals signs and nursing note reviewed  Constitutional:       General: He is not in acute distress  Appearance: Normal appearance  He is obese  He is not ill-appearing, toxic-appearing or diaphoretic  HENT:      Head: Normocephalic and atraumatic  Eyes:      Extraocular Movements: Extraocular movements intact  Conjunctiva/sclera: Conjunctivae normal       Pupils: Pupils are equal, round, and reactive to light  Neck:      Musculoskeletal: Normal range of motion  Vascular: No carotid bruit  Comments: No thyromegaly  Cardiovascular:      Rate and Rhythm: Normal rate and regular rhythm  Pulses: Normal pulses  Heart sounds: No murmur  Pulmonary:      Effort: Pulmonary effort is normal       Breath sounds: Normal breath sounds  Abdominal:      General: Bowel sounds are normal       Palpations: Abdomen is soft  There is no mass  Tenderness: There is no abdominal tenderness  Hernia: No hernia is present  Musculoskeletal:      Right lower leg: No edema  Left lower leg: No edema  Comments: No deformity or swelling of joints   Lymphadenopathy:      Cervical: No cervical adenopathy  Neurological:      General: No focal deficit present  Mental Status: He is alert and oriented to person, place, and time  Sensory: No sensory deficit        Coordination: Coordination normal       Deep Tendon Reflexes: Reflexes normal       Comments: Proprioception and strength in bilateral upper and lower extremities normal     Psychiatric:         Mood and Affect: Mood normal

## 2021-02-05 ENCOUNTER — OFFICE VISIT (OUTPATIENT)
Dept: FAMILY MEDICINE CLINIC | Facility: CLINIC | Age: 56
End: 2021-02-05
Payer: MEDICARE

## 2021-02-05 VITALS
HEART RATE: 92 BPM | OXYGEN SATURATION: 96 % | RESPIRATION RATE: 18 BRPM | TEMPERATURE: 98 F | DIASTOLIC BLOOD PRESSURE: 90 MMHG | HEIGHT: 70 IN | WEIGHT: 312 LBS | BODY MASS INDEX: 44.67 KG/M2 | SYSTOLIC BLOOD PRESSURE: 140 MMHG

## 2021-02-05 DIAGNOSIS — G62.9 NEUROPATHY: ICD-10-CM

## 2021-02-05 DIAGNOSIS — E66.01 MORBID OBESITY (HCC): ICD-10-CM

## 2021-02-05 DIAGNOSIS — E55.9 VITAMIN D DEFICIENCY: Primary | ICD-10-CM

## 2021-02-05 DIAGNOSIS — R73.03 PREDIABETES: ICD-10-CM

## 2021-02-05 DIAGNOSIS — F33.2 MAJOR DEPRESSIVE DISORDER, RECURRENT, SEVERE WITHOUT PSYCHOTIC FEATURES (HCC): ICD-10-CM

## 2021-02-05 PROCEDURE — 99214 OFFICE O/P EST MOD 30 MIN: CPT | Performed by: FAMILY MEDICINE

## 2021-02-05 RX ORDER — ASPIRIN 325 MG
325 TABLET ORAL EVERY 6 HOURS
COMMUNITY
End: 2021-03-11 | Stop reason: HOSPADM

## 2021-02-05 NOTE — PATIENT INSTRUCTIONS
Repeat vitamin d level after completing the high dose vitamin d  Follow up with neurology regarding your neuropathy  Schedule annual physical with your PCP

## 2021-02-15 ENCOUNTER — HOSPITAL ENCOUNTER (OUTPATIENT)
Dept: NUCLEAR MEDICINE | Facility: HOSPITAL | Age: 56
Discharge: HOME | End: 2021-02-15
Attending: SURGERY
Payer: MEDICARE

## 2021-02-15 DIAGNOSIS — K42.9 UMBILICAL HERNIA: ICD-10-CM

## 2021-02-15 PROCEDURE — G1004 CDSM NDSC: HCPCS

## 2021-02-15 PROCEDURE — 78227 HEPATOBIL SYST IMAGE W/DRUG: CPT

## 2021-02-15 PROCEDURE — A9537 TC99M MEBROFENIN: HCPCS

## 2021-02-15 RX ADMIN — SINCALIDE 2.8 MCG: 5 INJECTION, POWDER, LYOPHILIZED, FOR SOLUTION INTRAVENOUS at 13:39

## 2021-02-16 ENCOUNTER — TELEPHONE (OUTPATIENT)
Dept: SURGERY | Facility: CLINIC | Age: 56
End: 2021-02-16

## 2021-02-17 NOTE — TELEPHONE ENCOUNTER
Attempted to call and left a message   Please assist in scheduling an appointment on Monday with Dr Tierney Back when he calls back to discuss surgery

## 2021-02-22 ENCOUNTER — TELEPHONE (OUTPATIENT)
Dept: NEUROLOGY | Facility: CLINIC | Age: 56
End: 2021-02-22

## 2021-02-22 ENCOUNTER — OFFICE VISIT (OUTPATIENT)
Dept: SURGERY | Facility: CLINIC | Age: 56
End: 2021-02-22
Payer: MEDICARE

## 2021-02-22 VITALS
BODY MASS INDEX: 44.95 KG/M2 | DIASTOLIC BLOOD PRESSURE: 90 MMHG | HEART RATE: 90 BPM | RESPIRATION RATE: 18 BRPM | SYSTOLIC BLOOD PRESSURE: 152 MMHG | WEIGHT: 314 LBS | TEMPERATURE: 98.1 F | HEIGHT: 70 IN

## 2021-02-22 DIAGNOSIS — Z01.818 ENCOUNTER FOR PREADMISSION TESTING: ICD-10-CM

## 2021-02-22 DIAGNOSIS — K82.8 BILIARY DYSKINESIA: Primary | ICD-10-CM

## 2021-02-22 DIAGNOSIS — K42.9 RECURRENT UMBILICAL HERNIA: ICD-10-CM

## 2021-02-22 PROCEDURE — 99214 OFFICE O/P EST MOD 30 MIN: CPT | Performed by: SURGERY

## 2021-02-22 NOTE — TELEPHONE ENCOUNTER
Called pt to confirm upcoming apt in 2 weeks on 3/8/21  with Dr Suyapa TRANM on patients cell to call office    if any new/worsening symptoms to report? Asked pt if they are unable to keep apt or interested in virtual apt to please call office, also advised of no show fee  Advised we will call closer to day of apt for COVID screening

## 2021-02-22 NOTE — H&P
Assessment/Plan:  I reviewed the HIDA scan report  I discussed the report with the patient and told him that based upon the findings he suffers from biliary dyskinesia  I explained to him the procedure of laparoscopic cholecystectomy  I told him that we can repair the recurrent umbilical hernia at the same time  He verbalized understanding  We discussed about possible complications including but not limited to bile leak, CBD injury  We also talked about factors which lead to recurrence of hernia like obesity, uncontrolled diabetes, liver disease  He verbalized understanding  He signed the consent for the procedure  This will be scheduled at [facility] on March 25th 2021  No problem-specific Assessment & Plan notes found for this encounter  Diagnoses and all orders for this visit:    Biliary dyskinesia    Encounter for preadmission testing  -     CBC and differential; Future  -     Comprehensive metabolic panel; Future  -     ECG 12 lead; Future    Recurrent umbilical hernia          Subjective:      Patient ID: Christine Rodriguez is a 49 y o  male  53-year-old male patient came to my office after his HIDA scan to discuss the report  He told me that the pain that he experienced after the injection of CCK was similar to the pain that he gets normally  No new complaints  The following portions of the patient's history were reviewed and updated as appropriate:   He  has a past medical history of Adrenal adenoma, Arthritis, Carpal tunnel syndrome, Chronic back pain, Chronic pain disorder, Cubital tunnel syndrome, Fatigue, Fatty liver, Folliculitis, Hemorrhoid, Liver lesion, Lumbago, Morbid obesity (HCC), Myofascial pain syndrome, Neuropathy, Scalp lesion, Sleep apnea, Sleep difficulties, Thickening of wall of gallbladder, Tinea capitis, Ulnar 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, Umbilical hernia, and Vitamin D deficiency    He   Patient Active Problem List    Diagnosis Date Noted    Prediabetes 02/04/2021  Neuropathy 12/04/2020    Tension headache 12/04/2020    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    Other insomnia 04/11/2017    Major depressive disorder, recurrent, severe without psychotic features ([facility] ) 02/28/2017    ALYSHA (generalized anxiety disorder) 02/28/2017    Morbid obesity ([facility] ) 02/15/2017    Myofascial pain syndrome 02/13/2017    CMC arthritis, thumb, degenerative 01/11/2017    Tinea capitis 01/04/2017    Ulnar 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 of left upper extremity 12/21/2016    Cubital tunnel syndrome of both upper extremities 12/07/2016    Sleep apnea 12/07/2016    Chronic right-sided thoracic back pain 11/30/2016    Low back pain 11/27/2015    Adrenal adenoma 09/16/2015    Thoracic compression fracture ([place] [address] ) 09/16/2015    Vitamin D deficiency 12/45/8178    Umbilical hernia 53/59/1102     He  has a past surgical history that includes Patella surgery (Right); Carpal tunnel release (Bilateral); Umbilical hernia repair; Eye muscle surgery (Bilateral); pr exc skin benig 0 6-1 cm remaindr body (N/A, 12/8/2016); pr hemorrhoidectomy,int/ext, 2+ columns/groups (N/A, 2/12/2016); pr colonoscopy flx dx w/collj spec when pfrmd (N/A, 2/10/2016); Knee arthroscopy; pr egd transoral biopsy single/multiple (N/A, 8/23/2017); Cyst Removal; Hemorroidectomy; and Hernia repair  His family history includes Cancer in his mother; Depression in his sister; Diabetes type II in his father; No Known Problems in his sister; Suicide Attempts in his sister  He  reports that he has never smoked  He has never used smokeless tobacco  He reports current alcohol use  He reports that he does not use drugs    Current Outpatient Medications   Medication Sig Dispense Refill    ergocalciferol (VITAMIN D2) 50,000 units Take 1 capsule (50,000 Units total) by mouth once a week 12 capsule 0    gabapentin (NEURONTIN) 100 mg capsule Take 1 capsule (100 mg total) by mouth 3 (three) times a day 90 capsule 3    venlafaxine (EFFEXOR-XR) 75 mg 24 hr capsule Take 1 capsule (75 mg total) by mouth daily 30 capsule 3    zolpidem (AMBIEN) 10 mg tablet Take 1 tablet (10 mg total) by mouth daily at bedtime as needed for sleep To be filled on or after 11/11/20 30 tablet 3    aspirin 325 mg tablet Take 325 mg by mouth every 6 (six) hours       No current facility-administered medications for this visit  Current Outpatient Medications on File Prior to Visit   Medication Sig    ergocalciferol (VITAMIN D2) 50,000 units Take 1 capsule (50,000 Units total) by mouth once a week    gabapentin (NEURONTIN) 100 mg capsule Take 1 capsule (100 mg total) by mouth 3 (three) times a day    venlafaxine (EFFEXOR-XR) 75 mg 24 hr capsule Take 1 capsule (75 mg total) by mouth daily    zolpidem (AMBIEN) 10 mg tablet Take 1 tablet (10 mg total) by mouth daily at bedtime as needed for sleep To be filled on or after 11/11/20    aspirin 325 mg tablet Take 325 mg by mouth every 6 (six) hours     No current facility-administered medications on file prior to visit  He is allergic to penicillins       Review of Systems   Constitutional: Negative  HENT: Negative  Eyes: Negative  Respiratory: Negative  Cardiovascular: Negative  Gastrointestinal: Positive for abdominal pain  Negative for anal bleeding, blood in stool, constipation, diarrhea, nausea, rectal pain and vomiting  Endocrine: Negative  Genitourinary: Negative  Musculoskeletal: Positive for back pain  Negative for neck pain and neck stiffness  Skin: Negative  Allergic/Immunologic: Negative  Neurological: Negative  Hematological: Negative  Psychiatric/Behavioral: Negative  Objective:      /90 (BP Location: Right arm, Patient Position: Sitting, Cuff Size: Adult)   Pulse 90   Temp 98 1 °F (36 7 °C)   Resp 18   Ht 5' 10" (1 778 m)   Wt (!) 142 kg (314 lb)   BMI 45 05 kg/m²          Physical Exam  Constitutional:       Appearance: He is obese     HENT:      Head: Normocephalic and atraumatic  Nose: Nose normal       Mouth/Throat:      Mouth: Mucous membranes are moist    Eyes:      Pupils: Pupils are equal, round, and reactive to light  Neck:      Musculoskeletal: Normal range of motion  Cardiovascular:      Rate and Rhythm: Normal rate and regular rhythm  Pulses: Normal pulses  Heart sounds: Normal heart sounds  Pulmonary:      Effort: Pulmonary effort is normal       Breath sounds: Normal breath sounds  Abdominal:      General: Bowel sounds are normal  There is no distension  Palpations: Abdomen is soft  There is no mass  Tenderness: There is no abdominal tenderness  Hernia: A hernia (Reducible umbilical hernia) is present  Musculoskeletal: Normal range of motion  Skin:     General: Skin is warm  Neurological:      General: No focal deficit present  Mental Status: He is alert and oriented to person, place, and time     Psychiatric:         Mood and Affect: Mood normal          Behavior: Behavior normal

## 2021-02-22 NOTE — H&P (VIEW-ONLY)
Assessment/Plan:  I reviewed the HIDA scan report  I discussed the report with the patient and told him that based upon the findings he suffers from biliary dyskinesia  I explained to him the procedure of laparoscopic cholecystectomy  I told him that we can repair the recurrent umbilical hernia at the same time  He verbalized understanding  We discussed about possible complications including but not limited to bile leak, CBD injury  We also talked about factors which lead to recurrence of hernia like obesity, uncontrolled diabetes, liver disease  He verbalized understanding  He signed the consent for the procedure  This will be scheduled at [facility] on March 25th 2021  No problem-specific Assessment & Plan notes found for this encounter  Diagnoses and all orders for this visit:    Biliary dyskinesia    Encounter for preadmission testing  -     CBC and differential; Future  -     Comprehensive metabolic panel; Future  -     ECG 12 lead; Future    Recurrent umbilical hernia          Subjective:      Patient ID: Vadim Schulte is a 49 y o  male  44-year-old male patient came to my office after his HIDA scan to discuss the report  He told me that the pain that he experienced after the injection of CCK was similar to the pain that he gets normally  No new complaints  The following portions of the patient's history were reviewed and updated as appropriate:   He  has a past medical history of Adrenal adenoma, Arthritis, Carpal tunnel syndrome, Chronic back pain, Chronic pain disorder, Cubital tunnel syndrome, Fatigue, Fatty liver, Folliculitis, Hemorrhoid, Liver lesion, Lumbago, Morbid obesity (HCC), Myofascial pain syndrome, Neuropathy, Scalp lesion, Sleep apnea, Sleep difficulties, Thickening of wall of gallbladder, Tinea capitis, Ulnar 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, Umbilical hernia, and Vitamin D deficiency    He   Patient Active Problem List    Diagnosis Date Noted    Prediabetes 02/04/2021  Neuropathy 12/04/2020    Tension headache 12/04/2020    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    Other insomnia 04/11/2017    Major depressive disorder, recurrent, severe without psychotic features ([facility] ) 02/28/2017    ALYSHA (generalized anxiety disorder) 02/28/2017    Morbid obesity ([facility] ) 02/15/2017    Myofascial pain syndrome 02/13/2017    CMC arthritis, thumb, degenerative 01/11/2017    Tinea capitis 01/04/2017    Ulnar 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 of left upper extremity 12/21/2016    Cubital tunnel syndrome of both upper extremities 12/07/2016    Sleep apnea 12/07/2016    Chronic right-sided thoracic back pain 11/30/2016    Low back pain 11/27/2015    Adrenal adenoma 09/16/2015    Thoracic compression fracture ([place] [id number] ) 09/16/2015    Vitamin D deficiency 94/32/1681    Umbilical hernia 19/37/4912     He  has a past surgical history that includes Patella surgery (Right); Carpal tunnel release (Bilateral); Umbilical hernia repair; Eye muscle surgery (Bilateral); pr exc skin benig 0 6-1 cm remaindr body (N/A, 12/8/2016); pr hemorrhoidectomy,int/ext, 2+ columns/groups (N/A, 2/12/2016); pr colonoscopy flx dx w/collj spec when pfrmd (N/A, 2/10/2016); Knee arthroscopy; pr egd transoral biopsy single/multiple (N/A, 8/23/2017); Cyst Removal; Hemorroidectomy; and Hernia repair  His family history includes Cancer in his mother; Depression in his sister; Diabetes type II in his father; No Known Problems in his sister; Suicide Attempts in his sister  He  reports that he has never smoked  He has never used smokeless tobacco  He reports current alcohol use  He reports that he does not use drugs    Current Outpatient Medications   Medication Sig Dispense Refill    ergocalciferol (VITAMIN D2) 50,000 units Take 1 capsule (50,000 Units total) by mouth once a week 12 capsule 0    gabapentin (NEURONTIN) 100 mg capsule Take 1 capsule (100 mg total) by mouth 3 (three) times a day 90 capsule 3    venlafaxine (EFFEXOR-XR) 75 mg 24 hr capsule Take 1 capsule (75 mg total) by mouth daily 30 capsule 3    zolpidem (AMBIEN) 10 mg tablet Take 1 tablet (10 mg total) by mouth daily at bedtime as needed for sleep To be filled on or after 11/11/20 30 tablet 3    aspirin 325 mg tablet Take 325 mg by mouth every 6 (six) hours       No current facility-administered medications for this visit  Current Outpatient Medications on File Prior to Visit   Medication Sig    ergocalciferol (VITAMIN D2) 50,000 units Take 1 capsule (50,000 Units total) by mouth once a week    gabapentin (NEURONTIN) 100 mg capsule Take 1 capsule (100 mg total) by mouth 3 (three) times a day    venlafaxine (EFFEXOR-XR) 75 mg 24 hr capsule Take 1 capsule (75 mg total) by mouth daily    zolpidem (AMBIEN) 10 mg tablet Take 1 tablet (10 mg total) by mouth daily at bedtime as needed for sleep To be filled on or after 11/11/20    aspirin 325 mg tablet Take 325 mg by mouth every 6 (six) hours     No current facility-administered medications on file prior to visit  He is allergic to penicillins       Review of Systems   Constitutional: Negative  HENT: Negative  Eyes: Negative  Respiratory: Negative  Cardiovascular: Negative  Gastrointestinal: Positive for abdominal pain  Negative for anal bleeding, blood in stool, constipation, diarrhea, nausea, rectal pain and vomiting  Endocrine: Negative  Genitourinary: Negative  Musculoskeletal: Positive for back pain  Negative for neck pain and neck stiffness  Skin: Negative  Allergic/Immunologic: Negative  Neurological: Negative  Hematological: Negative  Psychiatric/Behavioral: Negative  Objective:      /90 (BP Location: Right arm, Patient Position: Sitting, Cuff Size: Adult)   Pulse 90   Temp 98 1 °F (36 7 °C)   Resp 18   Ht 5' 10" (1 778 m)   Wt (!) 142 kg (314 lb)   BMI 45 05 kg/m²          Physical Exam  Constitutional:       Appearance: He is obese     HENT:      Head: Normocephalic and atraumatic  Nose: Nose normal       Mouth/Throat:      Mouth: Mucous membranes are moist    Eyes:      Pupils: Pupils are equal, round, and reactive to light  Neck:      Musculoskeletal: Normal range of motion  Cardiovascular:      Rate and Rhythm: Normal rate and regular rhythm  Pulses: Normal pulses  Heart sounds: Normal heart sounds  Pulmonary:      Effort: Pulmonary effort is normal       Breath sounds: Normal breath sounds  Abdominal:      General: Bowel sounds are normal  There is no distension  Palpations: Abdomen is soft  There is no mass  Tenderness: There is no abdominal tenderness  Hernia: A hernia (Reducible umbilical hernia) is present  Musculoskeletal: Normal range of motion  Skin:     General: Skin is warm  Neurological:      General: No focal deficit present  Mental Status: He is alert and oriented to person, place, and time     Psychiatric:         Mood and Affect: Mood normal          Behavior: Behavior normal

## 2021-02-22 NOTE — PROGRESS NOTES
Assessment/Plan:  I reviewed the HIDA scan report  I discussed the report with the patient and told him that based upon the findings he suffers from biliary dyskinesia  I explained to him the procedure of laparoscopic cholecystectomy  I told him that we can repair the recurrent umbilical hernia at the same time  He verbalized understanding  We discussed about possible complications including but not limited to bile leak, CBD injury  We also talked about factors which lead to recurrence of hernia like obesity, uncontrolled diabetes, liver disease  He verbalized understanding  He signed the consent for the procedure  This will be scheduled at [facility] on March 25th 2021  No problem-specific Assessment & Plan notes found for this encounter  Diagnoses and all orders for this visit:    Biliary dyskinesia    Encounter for preadmission testing  -     CBC and differential; Future  -     Comprehensive metabolic panel; Future  -     ECG 12 lead; Future    Recurrent umbilical hernia          Subjective:      Patient ID: Adebayo Tong is a 49 y o  male  62-year-old male patient came to my office after his HIDA scan to discuss the report  He told me that the pain that he experienced after the injection of CCK was similar to the pain that he gets normally  No new complaints  The following portions of the patient's history were reviewed and updated as appropriate:   He  has a past medical history of Adrenal adenoma, Arthritis, Carpal tunnel syndrome, Chronic back pain, Chronic pain disorder, Cubital tunnel syndrome, Fatigue, Fatty liver, Folliculitis, Hemorrhoid, Liver lesion, Lumbago, Morbid obesity (HCC), Myofascial pain syndrome, Neuropathy, Scalp lesion, Sleep apnea, Sleep difficulties, Thickening of wall of gallbladder, Tinea capitis, Ulnar 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, Umbilical hernia, and Vitamin D deficiency    He   Patient Active Problem List    Diagnosis Date Noted    Prediabetes 02/04/2021  Neuropathy 12/04/2020    Tension headache 12/04/2020    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    Other insomnia 04/11/2017    Major depressive disorder, recurrent, severe without psychotic features ([facility] ) 02/28/2017    ALYSHA (generalized anxiety disorder) 02/28/2017    Morbid obesity ([facility] ) 02/15/2017    Myofascial pain syndrome 02/13/2017    CMC arthritis, thumb, degenerative 01/11/2017    Tinea capitis 01/04/2017    Ulnar 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 of left upper extremity 12/21/2016    Cubital tunnel syndrome of both upper extremities 12/07/2016    Sleep apnea 12/07/2016    Chronic right-sided thoracic back pain 11/30/2016    Low back pain 11/27/2015    Adrenal adenoma 09/16/2015    Thoracic compression fracture ([place] [address] ) 09/16/2015    Vitamin D deficiency 21/48/4751    Umbilical hernia 39/53/7546     He  has a past surgical history that includes Patella surgery (Right); Carpal tunnel release (Bilateral); Umbilical hernia repair; Eye muscle surgery (Bilateral); pr exc skin benig 0 6-1 cm remaindr body (N/A, 12/8/2016); pr hemorrhoidectomy,int/ext, 2+ columns/groups (N/A, 2/12/2016); pr colonoscopy flx dx w/collj spec when pfrmd (N/A, 2/10/2016); Knee arthroscopy; pr egd transoral biopsy single/multiple (N/A, 8/23/2017); Cyst Removal; Hemorroidectomy; and Hernia repair  His family history includes Cancer in his mother; Depression in his sister; Diabetes type II in his father; No Known Problems in his sister; Suicide Attempts in his sister  He  reports that he has never smoked  He has never used smokeless tobacco  He reports current alcohol use  He reports that he does not use drugs    Current Outpatient Medications   Medication Sig Dispense Refill    ergocalciferol (VITAMIN D2) 50,000 units Take 1 capsule (50,000 Units total) by mouth once a week 12 capsule 0    gabapentin (NEURONTIN) 100 mg capsule Take 1 capsule (100 mg total) by mouth 3 (three) times a day 90 capsule 3    venlafaxine (EFFEXOR-XR) 75 mg 24 hr capsule Take 1 capsule (75 mg total) by mouth daily 30 capsule 3    zolpidem (AMBIEN) 10 mg tablet Take 1 tablet (10 mg total) by mouth daily at bedtime as needed for sleep To be filled on or after 11/11/20 30 tablet 3    aspirin 325 mg tablet Take 325 mg by mouth every 6 (six) hours       No current facility-administered medications for this visit  Current Outpatient Medications on File Prior to Visit   Medication Sig    ergocalciferol (VITAMIN D2) 50,000 units Take 1 capsule (50,000 Units total) by mouth once a week    gabapentin (NEURONTIN) 100 mg capsule Take 1 capsule (100 mg total) by mouth 3 (three) times a day    venlafaxine (EFFEXOR-XR) 75 mg 24 hr capsule Take 1 capsule (75 mg total) by mouth daily    zolpidem (AMBIEN) 10 mg tablet Take 1 tablet (10 mg total) by mouth daily at bedtime as needed for sleep To be filled on or after 11/11/20    aspirin 325 mg tablet Take 325 mg by mouth every 6 (six) hours     No current facility-administered medications on file prior to visit  He is allergic to penicillins       Review of Systems   Constitutional: Negative  HENT: Negative  Eyes: Negative  Respiratory: Negative  Cardiovascular: Negative  Gastrointestinal: Positive for abdominal pain  Negative for anal bleeding, blood in stool, constipation, diarrhea, nausea, rectal pain and vomiting  Endocrine: Negative  Genitourinary: Negative  Musculoskeletal: Positive for back pain  Negative for neck pain and neck stiffness  Skin: Negative  Allergic/Immunologic: Negative  Neurological: Negative  Hematological: Negative  Psychiatric/Behavioral: Negative  Objective:      /90 (BP Location: Right arm, Patient Position: Sitting, Cuff Size: Adult)   Pulse 90   Temp 98 1 °F (36 7 °C)   Resp 18   Ht 5' 10" (1 778 m)   Wt (!) 142 kg (314 lb)   BMI 45 05 kg/m²          Physical Exam  Constitutional:       Appearance: He is obese     HENT:      Head: Normocephalic and atraumatic  Nose: Nose normal       Mouth/Throat:      Mouth: Mucous membranes are moist    Eyes:      Pupils: Pupils are equal, round, and reactive to light  Neck:      Musculoskeletal: Normal range of motion  Cardiovascular:      Rate and Rhythm: Normal rate and regular rhythm  Pulses: Normal pulses  Heart sounds: Normal heart sounds  Pulmonary:      Effort: Pulmonary effort is normal       Breath sounds: Normal breath sounds  Abdominal:      General: Bowel sounds are normal  There is no distension  Palpations: Abdomen is soft  There is no mass  Tenderness: There is no abdominal tenderness  Hernia: A hernia (Reducible umbilical hernia) is present  Musculoskeletal: Normal range of motion  Skin:     General: Skin is warm  Neurological:      General: No focal deficit present  Mental Status: He is alert and oriented to person, place, and time     Psychiatric:         Mood and Affect: Mood normal          Behavior: Behavior normal

## 2021-02-26 ENCOUNTER — OFFICE VISIT (OUTPATIENT)
Dept: LAB | Facility: CLINIC | Age: 56
End: 2021-02-26
Payer: MEDICARE

## 2021-02-26 ENCOUNTER — APPOINTMENT (OUTPATIENT)
Dept: LAB | Facility: CLINIC | Age: 56
End: 2021-02-26
Payer: MEDICARE

## 2021-02-26 DIAGNOSIS — Z01.818 ENCOUNTER FOR PREADMISSION TESTING: ICD-10-CM

## 2021-02-26 LAB
ALBUMIN SERPL BCP-MCNC: 3.5 G/DL (ref 3.5–5)
ALP SERPL-CCNC: 68 U/L (ref 46–116)
ALT SERPL W P-5'-P-CCNC: 32 U/L (ref 12–78)
ANION GAP SERPL CALCULATED.3IONS-SCNC: 8 MMOL/L (ref 4–13)
AST SERPL W P-5'-P-CCNC: 20 U/L (ref 5–45)
BASOPHILS # BLD AUTO: 0.05 THOUSANDS/ΜL (ref 0–0.1)
BASOPHILS NFR BLD AUTO: 1 % (ref 0–1)
BILIRUB SERPL-MCNC: 1.1 MG/DL (ref 0.2–1)
BUN SERPL-MCNC: 20 MG/DL (ref 5–25)
CALCIUM SERPL-MCNC: 8.3 MG/DL (ref 8.3–10.1)
CHLORIDE SERPL-SCNC: 104 MMOL/L (ref 100–108)
CO2 SERPL-SCNC: 26 MMOL/L (ref 21–32)
CREAT SERPL-MCNC: 1.2 MG/DL (ref 0.6–1.3)
EOSINOPHIL # BLD AUTO: 0.13 THOUSAND/ΜL (ref 0–0.61)
EOSINOPHIL NFR BLD AUTO: 2 % (ref 0–6)
ERYTHROCYTE [DISTWIDTH] IN BLOOD BY AUTOMATED COUNT: 12.8 % (ref 11.6–15.1)
GFR SERPL CREATININE-BSD FRML MDRD: 68 ML/MIN/1.73SQ M
GLUCOSE P FAST SERPL-MCNC: 121 MG/DL (ref 65–99)
HCT VFR BLD AUTO: 44.2 % (ref 36.5–49.3)
HGB BLD-MCNC: 14.7 G/DL (ref 12–17)
IMM GRANULOCYTES # BLD AUTO: 0.03 THOUSAND/UL (ref 0–0.2)
IMM GRANULOCYTES NFR BLD AUTO: 0 % (ref 0–2)
LYMPHOCYTES # BLD AUTO: 2.31 THOUSANDS/ΜL (ref 0.6–4.47)
LYMPHOCYTES NFR BLD AUTO: 30 % (ref 14–44)
MCH RBC QN AUTO: 29.3 PG (ref 26.8–34.3)
MCHC RBC AUTO-ENTMCNC: 33.3 G/DL (ref 31.4–37.4)
MCV RBC AUTO: 88 FL (ref 82–98)
MONOCYTES # BLD AUTO: 0.74 THOUSAND/ΜL (ref 0.17–1.22)
MONOCYTES NFR BLD AUTO: 10 % (ref 4–12)
NEUTROPHILS # BLD AUTO: 4.36 THOUSANDS/ΜL (ref 1.85–7.62)
NEUTS SEG NFR BLD AUTO: 57 % (ref 43–75)
NRBC BLD AUTO-RTO: 0 /100 WBCS
PLATELET # BLD AUTO: 259 THOUSANDS/UL (ref 149–390)
PMV BLD AUTO: 9.4 FL (ref 8.9–12.7)
POTASSIUM SERPL-SCNC: 4.2 MMOL/L (ref 3.5–5.3)
PROT SERPL-MCNC: 7 G/DL (ref 6.4–8.2)
RBC # BLD AUTO: 5.02 MILLION/UL (ref 3.88–5.62)
SODIUM SERPL-SCNC: 138 MMOL/L (ref 136–145)
WBC # BLD AUTO: 7.62 THOUSAND/UL (ref 4.31–10.16)

## 2021-02-26 PROCEDURE — 93005 ELECTROCARDIOGRAM TRACING: CPT

## 2021-02-26 PROCEDURE — 36415 COLL VENOUS BLD VENIPUNCTURE: CPT

## 2021-02-26 PROCEDURE — 80053 COMPREHEN METABOLIC PANEL: CPT

## 2021-02-26 PROCEDURE — 85025 COMPLETE CBC W/AUTO DIFF WBC: CPT

## 2021-02-28 LAB
ATRIAL RATE: 80 BPM
P AXIS: 70 DEGREES
PR INTERVAL: 170 MS
QRS AXIS: 32 DEGREES
QRSD INTERVAL: 86 MS
QT INTERVAL: 390 MS
QTC INTERVAL: 450 MS
T WAVE AXIS: 27 DEGREES
VENTRICULAR RATE: 80 BPM

## 2021-02-28 PROCEDURE — 93010 ELECTROCARDIOGRAM REPORT: CPT | Performed by: INTERNAL MEDICINE

## 2021-03-04 ENCOUNTER — TELEPHONE (OUTPATIENT)
Dept: SURGERY | Facility: CLINIC | Age: 56
End: 2021-03-04

## 2021-03-04 ENCOUNTER — TELEPHONE (OUTPATIENT)
Dept: NEUROLOGY | Facility: CLINIC | Age: 56
End: 2021-03-04

## 2021-03-04 NOTE — TELEPHONE ENCOUNTER
Patient called because he is having sharp stabbing pains on his left rib cage for the last 4-5 days after eating  He also said he is having this slushy feeling in his rectum that he has never had before  There was no bowel movement for 4 days  He did finally go and now it has been 2 days since his last BM  Raul Heart would prefer not to go to the ER unless you instruct him too  There is no fever, nausea or vomiting  He would prefer to wait until 3/11 if possible, just wanted you to be aware of his latest symptoms

## 2021-03-08 ENCOUNTER — OFFICE VISIT (OUTPATIENT)
Dept: NEUROLOGY | Facility: CLINIC | Age: 56
End: 2021-03-08
Payer: MEDICARE

## 2021-03-08 VITALS
RESPIRATION RATE: 18 BRPM | BODY MASS INDEX: 44.49 KG/M2 | DIASTOLIC BLOOD PRESSURE: 64 MMHG | WEIGHT: 310.1 LBS | HEART RATE: 87 BPM | SYSTOLIC BLOOD PRESSURE: 126 MMHG

## 2021-03-08 DIAGNOSIS — G89.29 CHRONIC PAIN OF BOTH LOWER EXTREMITIES: ICD-10-CM

## 2021-03-08 DIAGNOSIS — M79.605 CHRONIC PAIN OF BOTH LOWER EXTREMITIES: ICD-10-CM

## 2021-03-08 DIAGNOSIS — E55.9 VITAMIN D DEFICIENCY: ICD-10-CM

## 2021-03-08 DIAGNOSIS — M79.601 CHRONIC PAIN OF BOTH UPPER EXTREMITIES: ICD-10-CM

## 2021-03-08 DIAGNOSIS — G43.009 MIGRAINE WITHOUT AURA AND WITHOUT STATUS MIGRAINOSUS, NOT INTRACTABLE: ICD-10-CM

## 2021-03-08 DIAGNOSIS — M79.604 CHRONIC PAIN OF BOTH LOWER EXTREMITIES: ICD-10-CM

## 2021-03-08 DIAGNOSIS — G89.29 CHRONIC PAIN OF BOTH UPPER EXTREMITIES: ICD-10-CM

## 2021-03-08 DIAGNOSIS — G44.229 CHRONIC TENSION-TYPE HEADACHE, NOT INTRACTABLE: Primary | ICD-10-CM

## 2021-03-08 DIAGNOSIS — M79.602 CHRONIC PAIN OF BOTH UPPER EXTREMITIES: ICD-10-CM

## 2021-03-08 PROCEDURE — 99215 OFFICE O/P EST HI 40 MIN: CPT | Performed by: PSYCHIATRY & NEUROLOGY

## 2021-03-08 RX ORDER — DIVALPROEX SODIUM 500 MG/1
TABLET, DELAYED RELEASE ORAL
Qty: 5 TABLET | Refills: 3 | Status: SHIPPED | OUTPATIENT
Start: 2021-03-08 | End: 2021-10-15 | Stop reason: ALTCHOICE

## 2021-03-08 NOTE — PROGRESS NOTES
Review of Systems   Constitutional: Negative  Negative for appetite change and fever  HENT: Negative  Negative for hearing loss, tinnitus, trouble swallowing and voice change  Eyes: Negative  Negative for photophobia and pain  Respiratory: Negative  Negative for shortness of breath  Cardiovascular: Negative  Negative for palpitations  Gastrointestinal: Positive for constipation  Negative for nausea and vomiting  Endocrine: Negative  Negative for cold intolerance  Genitourinary: Negative  Negative for dysuria, frequency and urgency  Musculoskeletal: Negative  Negative for myalgias and neck pain  Skin: Negative  Negative for rash  Allergic/Immunologic: Negative  Neurological: Positive for headaches  Negative for dizziness, tremors, seizures, syncope, facial asymmetry, speech difficulty, weakness, light-headedness and numbness  Hematological: Negative  Does not bruise/bleed easily  Psychiatric/Behavioral: Positive for sleep disturbance  Negative for confusion and hallucinations          Depression and anxiety

## 2021-03-08 NOTE — PATIENT INSTRUCTIONS
Take 1000 units daily vitamin D over the counter - follow up with primary doctor   Follow up with sleep medicine   Referral to pain specialist     [employer]: Saved by Dayna Rao - on [facility] - [employer] prime     [employer] - Download this free [employer] on your phone (they will offer subscription, but you do not have to do this)  - I recommend listening to the first approximately 5 or so lectures (more if you want) that are about 10-20 minutes long on the neuroscience of pain  - They discuss how pain works in the brain and steps to try and cure chronic pain    I recommend these free lectures from [employer]  "The basic neuroscience of pain"  "Pain is more than just tissue damage"  "How pain becomes chronic"  "What does the pain mean to you"  "What to do next"         Headache/migraine treatment:   Abortive medications (for immediate treatment of a headache): It is ok to take ibuprofen, acetaminophen or naproxen (Advil, Tylenol,  Aleve, Excedrin) if they help your headaches you should limit these to No more than 3 times a week to avoid medication overuse/rebound headaches  depakote 500 mg nightly for 3-5 nights to stop headache if it does not respond to exedrin        Prescription preventive medications for headaches/migraines   (to take every day to help prevent headaches - not to take at the time of headache):  [x]?continue venlafaxine through psychiatry - good med for headache prevention as well      Lifestyle Recommendations:  [x]? SLEEP - Maintain a regular sleep schedule: Adults need at least 7-8 hours of uninterrupted a night  Maintain good sleep hygiene:  Going to bed and waking up at consistent times, avoiding excessive daytime naps, avoiding caffeinated beverages in the evening, avoid excessive stimulation in the evening and generally using bed primarily for sleeping  One hour before bedtime would recommend turning lights down lower, decreasing your activity (may read quietly, listen to music at a low volume)   When you get into bed, should eliminate all technology (no texting, emailing, playing with your phone, iPad or tablet in bed)  [x]? HYDRATION - Maintain good hydration  Drink  2L of fluid a day (4 typical small water bottles)  [x]? DIET - Maintain good nutrition  In particular don't skip meals and try and eat healthy balanced meals regularly  [x]? TRIGGERS - Look for other triggers and avoid them: Limit caffeine to 1-2 cups a day or less  Avoid dietary triggers that you have noticed bring on your headaches (this could include aged cheese, peanuts, MSG, aspartame and nitrates)  [x]? EXERCISE - physical exercise as we all know is good for you in many ways, and not only is good for your heart, but also is beneficial for your mental health, cognitive health and  chronic pain/headaches  I would encourage at the least 5 days of physical exercise weekly for at least 30 minutes       Education and Follow-up  [x]? Please call with any questions or concerns  Of course if any new concerning symptoms go to the emergency department  [x]?  Follow up 3 months with Dr Maninder Mckenna and EDD Sanderson 6 months

## 2021-03-08 NOTE — PROGRESS NOTES
[address] [facility] Consult - Follow up   PATIENT:  Abbey Samuel  MRN:  [id number]  :  1965  DATE OF SERVICE:  3/8/2021  REFERRED BY: Lashon Short PA-C  PMD: Sho Agarwal MD    Assessment/Plan:     Abbey Samuel is a very pleasant 49 y o  male with a past medical history that includes chronic pain disorder, anxiety, depression, bilateral carpal tunnel syndrome 2012 s/p surgery, bilateral cubital tunnel syndrome, median nerve neuritis, chronic paresthesias, chronic pain disorder, chronic back pain, degenerative arthritis, morbid obesity, myofascial pain syndrome, insomnia, sleep apnea, vitamin-D deficiency, intercostal neuralgia referred here for evaluation of headache  My initial evaluation 2020  Follow up 3/8/2021     Chronic tension-type headache  Migraine without aura  At initial visit 2020, Patient denies a history of significant headaches or migraines although he would occasionally have some unilateral retro-orbital headaches that  Would resolve with Excedrin  notable to him was in 2020 he had 2 weeks straight of headaches that brought him into the emergency department  He reports the pain was diffuse all over an achy  Mild for the most part and occasionally more significant  He denies typical associated migrainous features  - as of 2020: In 2020, 2 weeks straight of headache, improved spontaneously and now only 1 mild headache in the past month   - as of 3/8/2021:   He reports since last visit 3-4 episodes of headaches lasting 3-4 days that do not resolve with Excedrin  Details in HPI  Trial of Depakote 500 mg  For 3-5 nights periodically for abortive  He is on venlafaxine through Psychiatry  Which may help with prevention      Workup:  - noncontrast head CT 2020: No acute intracranial abnormality    - MRI brain with without contrast 2015: Normal MRI examination of the brain   No pathologic enhancement      Preventative:  - we discussed headache hygiene and lifestyle factors that may improve headaches  -  on through other providers:  He was started on venlafaxine  Summer 2020 and currently on 75 mg daily,  Gabapentin  100 mg t i d , for sleep ambien 10 mg   - past: Wellbutrin, Zoloft, Abilify, Buspar, Citalopram     Abortive:  - discussed not taking over-the-counter or prescription pain medications more than 3 days per week to prevent medication overuse/rebound headache  - trial of depakote -     divalproex sodium (DEPAKOTE) 500 mg EC tablet; 500 mg nightly to try and stop headache for 3-5 nights  Discussed proper use, possible side effects and risks  Chronic Paresthesias  Sensory neuropathy - mild  Carpel tunnel Syndrome - chronic   History of bilateral CTS status post surgery 2012, bilateral cubital tunnel syndrome, chronic paresthesias, possibly uncontrolled mood disorder causing paresthesias  Patient reports chronic for years, worse over the past 8 months  Paresthesias go back and forth between 1 side of the body on the other in all 4 extremities  Worse if he sits in a chair for long time, worse at night  -     EMG 10/2020 - CTS, sensory neuropathy   -     12/5/20 - Vit D 11, Vit B12 436, A1c 5 9, cortisol normal, TSH normal, free T4 normal, Lyme negative   Chronic pain of both upper extremities  Chronic pain of both lower extremities  -     Ambulatory referral to Pain Management; Future       Vitamin D deficiency  -     12/5/20 - Vit D 11 - repleted   With 12 weeks of high-dose     -03/08/2021 recommended 1000 units daily and PCP follow-up          Medications I plan to avoid due to old EKG appearing to show prolonged QTC:  Hydroxyzine, diuretics, ondansetron, metoclopramide, donepezil, olanzapine, quetiapine, tricyclic antidepressants, (amitriptyline, protriptyline, nortriptyline), citalopram/celexa, escitalopram/Lexapro, fluoxetine, trazodone     (This is an incomplete list compiled to remind myself not to use these drugs which I otherwise may for various neurologic reasons )           Patient instructions      Take 1000 units daily vitamin D over the counter - follow up with primary doctor   Follow up with sleep medicine   Referral to pain specialist     [employer]: Saved by Lenora Agarwal - on [facility] - [employer] prime     [employer] - Download this free [employer] on your phone (they will offer subscription, but you do not have to do this)  - I recommend listening to the first approximately 5 or so lectures (more if you want) that are about 10-20 minutes long on the neuroscience of pain  - They discuss how pain works in the brain and steps to try and cure chronic pain    I recommend these free lectures from [employer]  "The basic neuroscience of pain"  "Pain is more than just tissue damage"  "How pain becomes chronic"  "What does the pain mean to you"  "What to do next"         Headache/migraine treatment:   Abortive medications (for immediate treatment of a headache): It is ok to take ibuprofen, acetaminophen or naproxen (Advil, Tylenol,  Aleve, Excedrin) if they help your headaches you should limit these to No more than 3 times a week to avoid medication overuse/rebound headaches  depakote 500 mg nightly for 3-5 nights to stop headache if it does not respond to exedrin        Prescription preventive medications for headaches/migraines   (to take every day to help prevent headaches - not to take at the time of headache):  [x]?continue venlafaxine through psychiatry - [facility] for headache prevention as well      Lifestyle Recommendations:  [x]? SLEEP - Maintain a regular sleep schedule: Adults need at least 7-8 hours of uninterrupted a night  Maintain good sleep hygiene:  Going to bed and waking up at consistent times, avoiding excessive daytime naps, avoiding caffeinated beverages in the evening, avoid excessive stimulation in the evening and generally using bed primarily for sleeping    One hour before bedtime would recommend turning lights down lower, decreasing your activity (may read quietly, listen to music at a low volume)  When you get into bed, should eliminate all technology (no texting, emailing, playing with your phone, iPad or tablet in bed)  [x]? HYDRATION - Maintain good hydration  Drink  2L of fluid a day (4 typical small water bottles)  [x]? DIET - Maintain good nutrition  In particular don't skip meals and try and eat healthy balanced meals regularly  [x]? TRIGGERS - Look for other triggers and avoid them: Limit caffeine to 1-2 cups a day or less  Avoid dietary triggers that you have noticed bring on your headaches (this could include aged cheese, peanuts, MSG, aspartame and nitrates)  [x]? EXERCISE - physical exercise as we all know is good for you in many ways, and not only is good for your heart, but also is beneficial for your mental health, cognitive health and  chronic pain/headaches  I would encourage at the least 5 days of physical exercise weekly for at least 30 minutes       Education and Follow-up  [x]? Please call with any questions or concerns  Of course if any new concerning symptoms go to the emergency department  [x]? Follow up 3 months with Dr Alec Rocha and ED[facility] Abreu 6 months            CC: We had the pleasure of evaluating Sadiq Tobar in neurological consultation today  Sadiq Tobar is a right handed male who presents today for evaluation of headaches       History obtained from patient as well as available medical record review    History of Present Illness:   Interval history as of 3/8/2021  -     EMG 10/2020 - CTS, mild sensory neuropathy   -     12/5/20 - Vit D 11, Vit B12 436, A1c 5 9, cortisol normal, TSH normal, free T4 normal, Lyme negative   - saw [facility] 12/4/20 - discussed mild right CTS - rec wrist splints   -  02/26/2021 CBC unremarkable  - Vit D - high dose for 12 weeks   - considering going back with counselor     3-4 times since June headaches lasting 2-3 days   Back and top of his head, does not radiate  +Nausea   Pulsating/pounding   6-9/10  Wakes up with them (new mask may not be working correctly)  No other triggers   Nothing makes it better  Preventative: did not start magnesium, on venlafaxine through psych - last increased to 75 mg 2 months ago, also on gabapentin 100 mg TID    Abortive: no longer responds to exedrin    Chronic pain in hands and feet     History as of initial visit 8/31/20  - went to ED [place] 3, 2020 for a couple weeks of headaches, neck soreness, back pain      Since July and August multiple health issues     Paresthesias in hands and feet for years, worse over past 8 months  - equal and goes back and forth   - worse if sits in chair for a long time  - worse at night   - CTS in 2012 s/p surgery  - EMG ordered in 2016      Headaches started at what age? Does not recall much headaches or migraines in the past  How often do the headaches occur?  - used to have a monthly headache that resolved with exedrin   - as of 8/31/2020: In June 2020, 2 weeks straight of headache, improved and now only 1 headache in the past month   - as of 3/8/2021:  3-4 times since June headaches lasting 5-6 days      How long do the headaches last? Last all day, worse in am, lessen as day went on and then worse again at night   Are you ever headache free? Yes     Aura? without aura        Where is your headache located and pain quality?   - diffuse, achy      What is the intensity of pain? Average: 3-4/10, worst 9-10/10  Associated symptoms:   [x]? Nausea       []? Vomiting        []? Diarrhea  []? Photophobia     []? Phonophobia - chronically misophonia that he thinks related to depression  []? Blurred vision   []? Light-headed or dizzy     []? Tinnitus      []? Red ear      []? Ptosis      []? Facial droop  []? Lacrimation  []? Nasal congestion/rhinorrhea   []? Flushing of face  []? Change in pupil size     Things that make the headache worse?  No specific movements, any movement      Headache triggers:  Unknown      Have you seen someone else for headaches or pain? Yes, PCP, ED  Have you ever had any Brain imaging? yes     What medications do you take or have you taken for your headaches? ABORTIVE:    OTC medications have been ineffective         PREVENTIVE:         For mood:  Venlafaxine 37 5 started 3-4 weeks ago   For sleep:  Ambien     Past for mood:  Wellbutrin  Zoloft  Abilify  Buspar  Citalopram        Alternative therapies used in the past for headaches?  no     LIFESTYLE  Sleep   - averages: LI on CPAP - 10-12, ambien      Water: 2L per day  Caffeine: 1/2 cup per day     Mood:  Anxiety, depression - follows with psychiatry every 2 months  - counseling in the past, not currently, currently not well controlled, but psychiatry managing      The following portions of the patient's history were reviewed and updated as appropriate: allergies, current medications, past family history, past medical history, past social history, past surgical history and problem list      Pertinent family history:  Family history of headaches:  migraine headaches in mother  Any family history of aneurysms - No     Pertinent social history:  Work: on disability for depression and physical - chronic back pain   Education: 2-3 years of college   Lives with mom and dad      Illicit Drugs: denies  Alcohol/tobacco: Denies tobacco use, alcohol intake: once a week a couple sips of beer      Past Medical History:     Past Medical History:   Diagnosis Date    Adrenal adenoma     Arthritis     Carpal tunnel syndrome     Chronic back pain     Chronic pain disorder     Cubital tunnel syndrome     Fatigue     Fatty liver     Folliculitis     Hemorrhoid     Liver lesion     Lumbago     Morbid obesity ([address] )     Myofascial pain syndrome     Neuropathy     Scalp lesion     Sleep apnea     CPAP dependance    Sleep difficulties     Thickening of wall of gallbladder     Tinea capitis     Ulnar 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     Umbilical hernia     Vitamin D deficiency        Patient Active Problem List   Diagnosis    Major depressive disorder, recurrent, severe without psychotic features ([facility] [address]    ALYSHA (generalized anxiety disorder)    Other insomnia    Adrenal adenoma    Chronic right-sided thoracic back pain    Cubital tunnel syndrome of both upper extremities    CMC arthritis, thumb, degenerative    Intercostal neuralgia    Low back pain    Morbid obesity (HCC)    Myofascial pain syndrome    Sleep apnea    Thoracic compression fracture (HCC)    Tinea capitis    Ulnar 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 of left upper extremity    Umbilical hernia    Vitamin D deficiency    Neuropathy    Tension headache    Prediabetes       Medications:      Current Outpatient Medications   Medication Sig Dispense Refill    gabapentin (NEURONTIN) 100 mg capsule Take 1 capsule (100 mg total) by mouth 3 (three) times a day 90 capsule 3    venlafaxine (EFFEXOR-XR) 75 mg 24 hr capsule Take 1 capsule (75 mg total) by mouth daily 30 capsule 3    zolpidem (AMBIEN) 10 mg tablet Take 1 tablet (10 mg total) by mouth daily at bedtime as needed for sleep To be filled on or after 11/11/20 30 tablet 3    aspirin 325 mg tablet Take 325 mg by mouth every 6 (six) hours      divalproex sodium (DEPAKOTE) 500 mg EC tablet 500 mg nightly to try and stop headache for 3-5 nights 5 tablet 3    ergocalciferol (VITAMIN D2) 50,000 units Take 1 capsule (50,000 Units total) by mouth once a week (Patient not taking: Reported on 3/8/2021) 12 capsule 0     No current facility-administered medications for this visit  Allergies:       Allergies   Allergen Reactions    Penicillins Rash       Family History:     Family History   Problem Relation Age of Onset   [facility] Cancer Mother     Diabetes type II Father     Depression Sister     Suicide Attempts Sister     No Known Problems Sister     Substance Abuse Neg Hx        Social History:     Social History     Socioeconomic History    Marital status: Legally      Spouse name: Not on file    Number of children: 1    Years of education: some college    Highest education level: Some college, no degree   Occupational History    Occupation: on disability   Social Needs    Financial resource strain: Not hard at all   Carmen-Sherrie insecurity     Worry: Never true     Inability: Never true    Transportation needs     Medical: No     Non-medical: No   Tobacco Use    Smoking status: Never Smoker    Smokeless tobacco: Never Used   Substance and Sexual Activity    Alcohol use: Yes     Frequency: Monthly or less     Drinks per session: 1 or 2     Binge frequency: Never     Comment: Social alcohol use    Drug use: No    Sexual activity: Yes     Partners: Female   Lifestyle    Physical activity     Days per week: 1 day     Minutes per session: 30 min    Stress:  To some extent   Relationships    Social connections     Talks on phone: Never     Gets together: Never     Attends Hoahaoism service: Never     Active member of club or organization: No     Attends meetings of clubs or organizations: Never     Relationship status:     Intimate partner violence     Fear of current or ex partner: No     Emotionally abused: No     Physically abused: No     Forced sexual activity: No   Other Topics Concern    Not on file   Social History Narrative    Education: some college    Learning Disabilities: none    Marital History:     Children: 1 adult son    Living Arrangement: lives in home with parents    Occupational History: on disability, 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    [address] [facility]: good support system    Legal History: none     History: None        Most recent tobacco use screenin2018         Objective:       Physical Exam: Vitals:            Constitutional:    /64 (BP Location: Right arm, Patient Position: Sitting, Cuff Size: Large)   Pulse 87   Resp 18   Wt (!) 141 kg (310 lb 1 6 oz)   BMI 44 49 kg/m²   BP Readings from Last 3 Encounters:   03/08/21 126/64   02/22/21 152/90   02/05/21 140/90     Pulse Readings from Last 3 Encounters:   03/08/21 87   02/22/21 90   02/05/21 92         Well developed, well nourished, well groomed  No dysmorphic features  HEENT:  Normocephalic atraumatic  See neuro exam   Chest:  Respirations regular and unlabored  Cardiovascular:  Regular rate, no observed significant swelling  Musculoskeletal:  Full range of motion  (see below under neurologic exam for evaluation of motor function and gait)   Skin:  warm and dry, not diaphoretic  No apparent birthmarks or stigmata of neurocutaneous disease  Psychiatric:  Normal behavior and flat affect        Neurological Examination:     Mental status/cognitive function:   Recent and remote memory intact  Attention span and concentration as well as fund of knowledge are appropriate for age  Normal language and spontaneous speech  Cranial Nerves:  III, IV, VI-Pupils were equal, round  Extraocular movements were full and conjugate   VII-facial expression symmetric  VIII-hearing grossly intact bilaterally   Motor Exam: symmetric bulk throughout  no atrophy, fasciculations or abnormal movements noted     Coordination:  no apparent dysmetria, ataxia or tremor noted  Gait: steady casual gait      Pertinent lab results:   06/03/2020:  CMP and CBC unremarkable  10/08/2019 TSH normal     EKG 02/05/2016:  Normal sinus rhythm, normal EKG, rate 82,      Imaging:   I have personally reviewed imaging and radiology read   - noncontrast head CT 06/03/2020: No acute intracranial abnormality   - MRI brain with without contrast 12/16/2015: Normal MRI examination of the brain   No pathologic enhancement   Review of Systems:   ROS obtained by medical assistant Personally reviewed and updated if indicated  Review of Systems   Constitutional: Negative  Negative for appetite change and fever  HENT: Negative  Negative for hearing loss, tinnitus, trouble swallowing and voice change  Eyes: Negative  Negative for photophobia and pain  Respiratory: Negative  Negative for shortness of breath  Cardiovascular: Negative  Negative for palpitations  Gastrointestinal: Positive for constipation  Negative for nausea and vomiting  Endocrine: Negative  Negative for cold intolerance  Genitourinary: Negative  Negative for dysuria, frequency and urgency  Musculoskeletal: Negative  Negative for myalgias and neck pain  Skin: Negative  Negative for rash  Allergic/Immunologic: Negative  Neurological: Positive for headaches  Negative for dizziness, tremors, seizures, syncope, facial asymmetry, speech difficulty, weakness, light-headedness and numbness  Hematological: Negative  Does not bruise/bleed easily  Psychiatric/Behavioral: Positive for sleep disturbance  Negative for confusion and hallucinations  Depression and anxiety       I have spent 32 minutes with Patient  today in which greater than 50% of this time was spent in counseling/coordination of care regarding Prognosis, Risks and benefits of tx options, Intructions for management, Patient  education, Importance of tx compliance, Risk factor reductions and Impressions  I also spent 15 minutes non face to face for this patient the same day         Author:  Kanika Turcios MD 3/8/2021 1:57 PM

## 2021-03-10 DIAGNOSIS — Z23 ENCOUNTER FOR IMMUNIZATION: ICD-10-CM

## 2021-03-11 ENCOUNTER — ANESTHESIA (OUTPATIENT)
Dept: PERIOP | Facility: HOSPITAL | Age: 56
End: 2021-03-11
Payer: MEDICARE

## 2021-03-11 ENCOUNTER — ANESTHESIA EVENT (OUTPATIENT)
Dept: PERIOP | Facility: HOSPITAL | Age: 56
End: 2021-03-11
Payer: MEDICARE

## 2021-03-11 ENCOUNTER — HOSPITAL ENCOUNTER (OUTPATIENT)
Facility: HOSPITAL | Age: 56
Setting detail: OUTPATIENT SURGERY
Discharge: HOME | End: 2021-03-11
Attending: SURGERY | Admitting: SURGERY
Payer: MEDICARE

## 2021-03-11 VITALS
BODY MASS INDEX: 44.01 KG/M2 | OXYGEN SATURATION: 94 % | DIASTOLIC BLOOD PRESSURE: 77 MMHG | RESPIRATION RATE: 18 BRPM | TEMPERATURE: 98.7 F | HEART RATE: 93 BPM | HEIGHT: 70 IN | WEIGHT: 307.4 LBS | SYSTOLIC BLOOD PRESSURE: 128 MMHG

## 2021-03-11 DIAGNOSIS — K42.9 RECURRENT UMBILICAL HERNIA: ICD-10-CM

## 2021-03-11 DIAGNOSIS — K82.8 BILIARY DYSKINESIA: ICD-10-CM

## 2021-03-11 PROCEDURE — 88304 TISSUE EXAM BY PATHOLOGIST: CPT | Performed by: PATHOLOGY

## 2021-03-11 PROCEDURE — 47562 LAPAROSCOPIC CHOLECYSTECTOMY: CPT | Performed by: SURGERY

## 2021-03-11 PROCEDURE — 47562 LAPAROSCOPIC CHOLECYSTECTOMY: CPT | Performed by: PHYSICIAN ASSISTANT

## 2021-03-11 PROCEDURE — G9196 MED REASON FOR NO CEPH: HCPCS | Performed by: SURGERY

## 2021-03-11 RX ORDER — FENTANYL CITRATE/PF 50 MCG/ML
50 SYRINGE (ML) INJECTION
Status: DISCONTINUED | OUTPATIENT
Start: 2021-03-11 | End: 2021-03-11 | Stop reason: HOSPADM

## 2021-03-11 RX ORDER — FENTANYL CITRATE 50 UG/ML
INJECTION, SOLUTION INTRAMUSCULAR; INTRAVENOUS AS NEEDED
Status: DISCONTINUED | OUTPATIENT
Start: 2021-03-11 | End: 2021-03-11

## 2021-03-11 RX ORDER — ONDANSETRON 2 MG/ML
4 INJECTION INTRAMUSCULAR; INTRAVENOUS ONCE
Status: DISCONTINUED | OUTPATIENT
Start: 2021-03-11 | End: 2021-03-11 | Stop reason: HOSPADM

## 2021-03-11 RX ORDER — SUCCINYLCHOLINE/SOD CL,ISO/PF 100 MG/5ML
SYRINGE (ML) INTRAVENOUS AS NEEDED
Status: DISCONTINUED | OUTPATIENT
Start: 2021-03-11 | End: 2021-03-11

## 2021-03-11 RX ORDER — PROPOFOL 10 MG/ML
INJECTION, EMULSION INTRAVENOUS AS NEEDED
Status: DISCONTINUED | OUTPATIENT
Start: 2021-03-11 | End: 2021-03-11

## 2021-03-11 RX ORDER — OXYCODONE HYDROCHLORIDE AND ACETAMINOPHEN 5; 325 MG/1; MG/1
1 TABLET ORAL EVERY 6 HOURS PRN
Qty: 12 TABLET | Refills: 0 | Status: SHIPPED | OUTPATIENT
Start: 2021-03-11 | End: 2021-03-21

## 2021-03-11 RX ORDER — CLINDAMYCIN PHOSPHATE 900 MG/50ML
INJECTION INTRAVENOUS AS NEEDED
Status: DISCONTINUED | OUTPATIENT
Start: 2021-03-11 | End: 2021-03-11

## 2021-03-11 RX ORDER — HYDROMORPHONE HCL/PF 1 MG/ML
0.5 SYRINGE (ML) INJECTION
Status: DISCONTINUED | OUTPATIENT
Start: 2021-03-11 | End: 2021-03-11 | Stop reason: HOSPADM

## 2021-03-11 RX ORDER — KETAMINE HCL IN NACL, ISO-OSM 100MG/10ML
SYRINGE (ML) INJECTION AS NEEDED
Status: DISCONTINUED | OUTPATIENT
Start: 2021-03-11 | End: 2021-03-11

## 2021-03-11 RX ORDER — MIDAZOLAM HYDROCHLORIDE 2 MG/2ML
INJECTION, SOLUTION INTRAMUSCULAR; INTRAVENOUS AS NEEDED
Status: DISCONTINUED | OUTPATIENT
Start: 2021-03-11 | End: 2021-03-11

## 2021-03-11 RX ORDER — LIDOCAINE HYDROCHLORIDE 10 MG/ML
INJECTION, SOLUTION EPIDURAL; INFILTRATION; INTRACAUDAL; PERINEURAL AS NEEDED
Status: DISCONTINUED | OUTPATIENT
Start: 2021-03-11 | End: 2021-03-11

## 2021-03-11 RX ORDER — ONDANSETRON 2 MG/ML
INJECTION INTRAMUSCULAR; INTRAVENOUS AS NEEDED
Status: DISCONTINUED | OUTPATIENT
Start: 2021-03-11 | End: 2021-03-11

## 2021-03-11 RX ORDER — SODIUM CHLORIDE, SODIUM LACTATE, POTASSIUM CHLORIDE, CALCIUM CHLORIDE 600; 310; 30; 20 MG/100ML; MG/100ML; MG/100ML; MG/100ML
INJECTION, SOLUTION INTRAVENOUS CONTINUOUS PRN
Status: DISCONTINUED | OUTPATIENT
Start: 2021-03-11 | End: 2021-03-11

## 2021-03-11 RX ORDER — DEXAMETHASONE SODIUM PHOSPHATE 4 MG/ML
INJECTION, SOLUTION INTRA-ARTICULAR; INTRALESIONAL; INTRAMUSCULAR; INTRAVENOUS; SOFT TISSUE AS NEEDED
Status: DISCONTINUED | OUTPATIENT
Start: 2021-03-11 | End: 2021-03-11

## 2021-03-11 RX ORDER — ONDANSETRON 2 MG/ML
4 INJECTION INTRAMUSCULAR; INTRAVENOUS EVERY 6 HOURS PRN
Status: DISCONTINUED | OUTPATIENT
Start: 2021-03-11 | End: 2021-03-11 | Stop reason: HOSPADM

## 2021-03-11 RX ORDER — MAGNESIUM HYDROXIDE 1200 MG/15ML
LIQUID ORAL AS NEEDED
Status: DISCONTINUED | OUTPATIENT
Start: 2021-03-11 | End: 2021-03-11 | Stop reason: HOSPADM

## 2021-03-11 RX ORDER — CLINDAMYCIN PHOSPHATE 600 MG/50ML
600 INJECTION INTRAVENOUS ONCE
Status: DISCONTINUED | OUTPATIENT
Start: 2021-03-11 | End: 2021-03-11 | Stop reason: HOSPADM

## 2021-03-11 RX ORDER — SODIUM CHLORIDE, SODIUM LACTATE, POTASSIUM CHLORIDE, CALCIUM CHLORIDE 600; 310; 30; 20 MG/100ML; MG/100ML; MG/100ML; MG/100ML
125 INJECTION, SOLUTION INTRAVENOUS CONTINUOUS
Status: DISCONTINUED | OUTPATIENT
Start: 2021-03-11 | End: 2021-03-11 | Stop reason: HOSPADM

## 2021-03-11 RX ORDER — ROCURONIUM BROMIDE 10 MG/ML
INJECTION, SOLUTION INTRAVENOUS AS NEEDED
Status: DISCONTINUED | OUTPATIENT
Start: 2021-03-11 | End: 2021-03-11

## 2021-03-11 RX ORDER — BUPIVACAINE HYDROCHLORIDE AND EPINEPHRINE 2.5; 5 MG/ML; UG/ML
INJECTION, SOLUTION INFILTRATION; PERINEURAL AS NEEDED
Status: DISCONTINUED | OUTPATIENT
Start: 2021-03-11 | End: 2021-03-11 | Stop reason: HOSPADM

## 2021-03-11 RX ORDER — OXYCODONE HYDROCHLORIDE AND ACETAMINOPHEN 5; 325 MG/1; MG/1
1 TABLET ORAL EVERY 4 HOURS PRN
Status: DISCONTINUED | OUTPATIENT
Start: 2021-03-11 | End: 2021-03-11 | Stop reason: HOSPADM

## 2021-03-11 RX ADMIN — ROCURONIUM BROMIDE 50 MG: 10 INJECTION, SOLUTION INTRAVENOUS at 07:52

## 2021-03-11 RX ADMIN — FENTANYL CITRATE 50 MCG: 50 INJECTION, SOLUTION INTRAMUSCULAR; INTRAVENOUS at 09:32

## 2021-03-11 RX ADMIN — MIDAZOLAM HYDROCHLORIDE 2 MG: 1 INJECTION, SOLUTION INTRAMUSCULAR; INTRAVENOUS at 07:35

## 2021-03-11 RX ADMIN — LIDOCAINE HYDROCHLORIDE 50 MG: 10 INJECTION, SOLUTION EPIDURAL; INFILTRATION; INTRACAUDAL; PERINEURAL at 07:44

## 2021-03-11 RX ADMIN — HYDROMORPHONE HYDROCHLORIDE 0.5 MG: 1 INJECTION, SOLUTION INTRAMUSCULAR; INTRAVENOUS; SUBCUTANEOUS at 10:14

## 2021-03-11 RX ADMIN — SODIUM CHLORIDE, SODIUM LACTATE, POTASSIUM CHLORIDE, AND CALCIUM CHLORIDE: .6; .31; .03; .02 INJECTION, SOLUTION INTRAVENOUS at 09:10

## 2021-03-11 RX ADMIN — FENTANYL CITRATE 50 MCG: 50 INJECTION, SOLUTION INTRAMUSCULAR; INTRAVENOUS at 08:17

## 2021-03-11 RX ADMIN — SODIUM CHLORIDE, SODIUM LACTATE, POTASSIUM CHLORIDE, AND CALCIUM CHLORIDE: .6; .31; .03; .02 INJECTION, SOLUTION INTRAVENOUS at 07:36

## 2021-03-11 RX ADMIN — FENTANYL CITRATE 50 MCG: 50 INJECTION, SOLUTION INTRAMUSCULAR; INTRAVENOUS at 09:38

## 2021-03-11 RX ADMIN — FENTANYL CITRATE 50 MCG: 50 INJECTION, SOLUTION INTRAMUSCULAR; INTRAVENOUS at 09:54

## 2021-03-11 RX ADMIN — FENTANYL CITRATE 50 MCG: 50 INJECTION, SOLUTION INTRAMUSCULAR; INTRAVENOUS at 09:48

## 2021-03-11 RX ADMIN — Medication 50 MG: at 07:44

## 2021-03-11 RX ADMIN — CLINDAMYCIN PHOSPHATE 900 MG: 18 INJECTION, SOLUTION INTRAMUSCULAR; INTRAVENOUS at 07:32

## 2021-03-11 RX ADMIN — MIDAZOLAM HYDROCHLORIDE 2 MG: 1 INJECTION, SOLUTION INTRAMUSCULAR; INTRAVENOUS at 09:30

## 2021-03-11 RX ADMIN — PROPOFOL 200 MG: 10 INJECTION, EMULSION INTRAVENOUS at 07:44

## 2021-03-11 RX ADMIN — Medication 100 MG: at 07:44

## 2021-03-11 RX ADMIN — SODIUM CHLORIDE, SODIUM LACTATE, POTASSIUM CHLORIDE, AND CALCIUM CHLORIDE: .6; .31; .03; .02 INJECTION, SOLUTION INTRAVENOUS at 07:15

## 2021-03-11 RX ADMIN — DEXAMETHASONE SODIUM PHOSPHATE 4 MG: 4 INJECTION, SOLUTION INTRAMUSCULAR; INTRAVENOUS at 07:45

## 2021-03-11 RX ADMIN — OXYCODONE HYDROCHLORIDE AND ACETAMINOPHEN 1 TABLET: 5; 325 TABLET ORAL at 11:47

## 2021-03-11 RX ADMIN — Medication 20 MG: at 09:36

## 2021-03-11 RX ADMIN — ROCURONIUM BROMIDE 20 MG: 10 INJECTION, SOLUTION INTRAVENOUS at 08:17

## 2021-03-11 RX ADMIN — ROCURONIUM BROMIDE 10 MG: 10 INJECTION, SOLUTION INTRAVENOUS at 08:38

## 2021-03-11 RX ADMIN — SUGAMMADEX 200 MG: 100 INJECTION, SOLUTION INTRAVENOUS at 09:20

## 2021-03-11 RX ADMIN — FENTANYL CITRATE 50 MCG: 50 INJECTION, SOLUTION INTRAMUSCULAR; INTRAVENOUS at 08:03

## 2021-03-11 RX ADMIN — FENTANYL CITRATE 100 MCG: 50 INJECTION, SOLUTION INTRAMUSCULAR; INTRAVENOUS at 07:44

## 2021-03-11 RX ADMIN — Medication 20 MG: at 09:31

## 2021-03-11 RX ADMIN — ONDANSETRON 4 MG: 2 INJECTION INTRAMUSCULAR; INTRAVENOUS at 07:45

## 2021-03-11 NOTE — ANESTHESIA POSTPROCEDURE EVALUATION
Post-Op Assessment Note    CV Status:  Stable  Pain Score: 5    Pain management: adequate     Mental Status:  Alert and agitated   PONV Controlled:  Controlled   Airway Patency:  Patent      Post Op Vitals Reviewed: Yes      Staff: CRNA         No complications documented      BP (P) 131/64 (03/11/21 0948)    Temp (P) 99 5 °F (37 5 °C) (03/11/21 0948)    Pulse (P) 96 (03/11/21 0948)   Resp (P) 20 (03/11/21 0948)    SpO2 (P) 97 % (03/11/21 0948)

## 2021-03-11 NOTE — ANESTHESIA PREPROCEDURE EVALUATION
Procedure:  CHOLECYSTECTOMY LAPAROSCOPIC (N/A Abdomen)  REPAIR HERNIA UMBILICAL (N/A Abdomen)    Relevant Problems   CARDIO   (+) Intercostal neuralgia      MUSCULOSKELETAL   (+) CMC arthritis, thumb, degenerative   (+) Chronic right-sided thoracic back pain   (+) Low back pain   (+) Myofascial pain syndrome      NEURO/PSYCH   (+) Chronic right-sided thoracic back pain   (+) ALYSHA (generalized anxiety disorder)   (+) Major depressive disorder, recurrent, severe without psychotic features (HCC)   (+) Myofascial pain syndrome      PULMONARY   (+) Sleep apnea        Physical Exam    Airway    Mallampati score: III  TM Distance: >3 FB  Neck ROM: full     Dental       Cardiovascular  Rhythm: regular, Rate: normal, Cardiovascular exam normal    Pulmonary  Pulmonary exam normal Breath sounds clear to auscultation,     Other Findings        Anesthesia Plan  ASA Score- 3     Anesthesia Type- general with ASA Monitors  Additional Monitors:   Airway Plan: ETT  Plan Factors-Exercise tolerance (METS): >4 METS  Chart reviewed  EKG reviewed  Existing labs reviewed  Patient is not a current smoker  Patient not instructed to abstain from smoking on day of procedure  Patient did not smoke on day of surgery  Obstructive sleep apnea risk education given perioperatively  Induction- intravenous  Postoperative Plan- Plan for postoperative opioid use  Planned trial extubation    Informed Consent- Anesthetic plan and risks discussed with patient  I personally reviewed this patient with the CRNA  Discussed and agreed on the Anesthesia Plan with the CRNA  Arthur Em

## 2021-03-11 NOTE — OP NOTE
OPERATIVE REPORT  PATIENT NAME: Marguerite Perez    :  [id number]  MRN: [id number]  Pt Location: [facility] OR ROOM 01    SURGERY DATE: 3/11/2021    Surgeon(s) and Role: * Ting Jarrett MD - Primary     * Eugenio Damon PA-C - Assisting    Preop Diagnosis:  Biliary dyskinesia [T96 6]  Recurrent umbilical hernia [G29 1]    Post-Op Diagnosis Codes:     * Biliary dyskinesia [K82 8]     * Recurrent umbilical hernia [O43 5]    Procedure(s) (LRB):  CHOLECYSTECTOMY LAPAROSCOPIC (N/A)  REPAIR HERNIA UMBILICAL RECURRENT (N/A)    Specimen(s):  ID Type Source Tests Collected by Time Destination   1 : gallbladder Tissue Gallbladder TISSUE EXAM Ting Jarrett MD 3/11/2021 7278        Estimated Blood Loss:   20 mL    Drains:  * No LDAs found *    Anesthesia Type:   General    Operative Indications:  Biliary dyskinesia [F66 1]  Recurrent umbilical hernia [J50 6]      Operative Findings:  2 cm recurrent umbilical hernia containing preperitoneal fat  Omental adhesions onto the gallbladder  Intrahepatic gallbladder    Complications:   None    Procedure and Technique:  The patient was brought to the operating room and was identified correctly by myself and the operating room staff  General anesthesia was given using ET tube by anesthesia team   Parts were prepped and draped in the standard fashion  A standard time-out was performed  Patient had SCDs on  We made a 2 cm incision vertically supraumbilical at the site of his hernia  The incision was deepened up to the fascia  The hernia sac was dissected and the hernia contents were reduced  The sac was excised  We use the hernia defect to put a jose port and insufflated the abdomen with CO2 up to 15 mm of mercury pressure  Three more ports all 5 mm were inserted  There were in the following position  They were all subcostal in the epigastric, midclavicular and anterior axillary line respectively  The gallbladder fundus was grasped and elevated cranially    The dissection was started using Kita Poplin by taking down the adhesions of the omentum bluntly  The Calot's triangle was dissected and a critical view of safety was obtained where you could see only 2 structures going into the gallbladder  The cystic duct was triple clipped and the cut between the clips leaving 2 clips on the cystic duct stump towards the patient side  The cystic artery was clipped and was cut   Patient had an intrahepatic gallbladder and it was dissected using hook electrocautery from the liver bed  There was spillage of bile which was aspirated and irrigated  The bleeding on the gallbladder bed was controlled using hook electrocautery and Surgicel  Copious irrigation of the bed was performed  No more bleeding was found  The gallbladder was then delivered into the Endo-Catch bag and taken out through the umbilical port site  All the excessive fluid was suctioned out  The ports were then removed under full visual guidance  The umbilical hernia was repaired after the removal of the port using Ethibond sutures in an interrupted fashion  Irrigation of the site was performed  Subcutaneous tissue was closed using 3-0 Vicryl at the umbilical port site  All the ports were closed using 4-0 Monocryl in a subcuticular fashion  Local anesthesia was given at all the port sites prior to closure  Exofin was applied at all port sites  Patient was reversed from anesthesia and was taken to the recovery under stable condition     I was present for the entire procedure, A qualified resident physician was not available and A physician assistant was required during the procedure for retraction tissue handling,dissection and suturing    Patient Disposition:  PACU     SIGNATURE: Lenny Morales MD  DATE: March 11, 2021  TIME: 9:19 AM

## 2021-03-11 NOTE — DISCHARGE INSTRUCTIONS
Umbilical Hernia   WHAT YOU NEED TO KNOW:   An umbilical hernia is a bulge through the abdominal wall near your umbilicus (belly button)  The hernia may contain tissue from the abdomen, part of an organ (such as the intestine), or fluid  DISCHARGE INSTRUCTIONS:   Return to the emergency department if:   · Your hernia gets bigger, feels firm, or turns blue or purple  · You have severe abdominal pain with nausea or vomiting  · You stop having bowel movements and passing gas  · You have blood in your bowel movement  Contact your healthcare provider if:   · You have a fever  · You have nausea or are vomiting  · You are constipated  · You have questions or concerns about your condition or care  Self-care:   · Drink more liquids  Liquids may prevent constipation and straining during a bowel movement  This can prevent your hernia from getting bigger  Ask how much liquid you should drink each day and which liquids are best for you  · Eat high-fiber foods  Fiber may prevent constipation and straining during a bowel movement  This can prevent your hernia from getting bigger  Foods that contain fiber include fruits, vegetables, legumes, and whole grains  · Avoid heavy lifting  Heavy lifting can put pressure on your hernia and make it bigger  Ask your healthcare provider how much is safe to lift  · Do not place anything over your umbilical hernia  Do not place tape or a coin over the hernia  This treatment does not help treat a hernia  Follow up with your healthcare provider as directed: You may need to see a surgeon to plan for hernia repair  Write down your questions so you remember to ask them during your visits  © Copyright [address] Information is for End User's use only and may not be sold, redistributed or otherwise used for commercial purposes   All illustrations and images included in CareNotes® are the copyrighted property of [employer]  or [employer]  The above information is an  only  It is not intended as medical advice for individual conditions or treatments  Talk to your doctor, nurse or pharmacist before following any medical regimen to see if it is safe and effective for you

## 2021-03-11 NOTE — PROGRESS NOTES
Dangled at bedside without difficulty  Ambulated to BR with minimal assist of 2  Tolerated procedure well

## 2021-03-11 NOTE — INTERVAL H&P NOTE
H&P reviewed  After examining the patient I find no changes in the patients condition since the H&P had been written      Vitals:    03/11/21 0701   BP: 133/78   Pulse: 82   Resp: 20   Temp: (!) 97 3 °F (36 3 °C)   SpO2: 97%

## 2021-03-12 ENCOUNTER — TELEPHONE (OUTPATIENT)
Dept: OTHER | Facility: OTHER | Age: 56
End: 2021-03-12

## 2021-03-12 ENCOUNTER — TELEPHONE (OUTPATIENT)
Dept: SURGERY | Facility: CLINIC | Age: 56
End: 2021-03-12

## 2021-03-12 NOTE — TELEPHONE ENCOUNTER
Post procedure call - Patient was very happy with the care he received at [facility] and pleased with Dr Remedios Tran  Everything was explained to him yesterday but he did forget some things  I answered any questions that he had

## 2021-03-12 NOTE — TELEPHONE ENCOUNTER
[phone] / Fabien Benoit [birth year] / re: pt had surgery yesterday   Having issues with using the bathroom, would like to know if he could take a laxative

## 2021-03-14 ENCOUNTER — NURSE TRIAGE (OUTPATIENT)
Dept: OTHER | Facility: OTHER | Age: 56
End: 2021-03-14

## 2021-03-14 NOTE — TELEPHONE ENCOUNTER
Reason for Disposition   [1] Caller has URGENT medication question about med that PCP or specialist prescribed AND [2] triager unable to answer question    Additional Information   Negative: Caller has URGENT question and triager unable to answer question   Negative: MILD TO MODERATE post-op pain (e g , pain scale 1-7) that is not controlled with pain medications    Answer Assessment - Initial Assessment Questions  1  SYMPTOM: "What's the main symptom you're concerned about?" (e g , pain, fever, vomiting)      Severe muscle stiffness in the calfs and biceps,  2  ONSET: "When did pain start?"      Three days ago  3  SURGERY: "What surgery was performed?"      3/11/2021  4  DATE of SURGERY: "When was surgery performed?"       Gallbladder removal and hernia repair  5  ANESTHESIA: " What type of anesthesia did you have?" (e g , general, spinal, epidural, local)      general  6  PAIN: "Is there any pain?" If so, ask: "How bad is it?"  (Scale 1-10; or mild, moderate, severe)      5/10 but due to take medication soon   7  FEVER: "Do you have a fever?" If so, ask: "What is your temperature, how was it measured, and when did it start?"      none  8  VOMITING: "Is there any vomiting?" If yes, ask: "How many times?"      none  9  BLEEDING: "Is there any bleeding?" If so, ask: "How much?" and "Where?"      none  10  OTHER SYMPTOMS: "Do you have any other symptoms?" (e g , drainage from wound, painful urination, constipation)    I am taking percocet and I believe I'm experiencing some side effects to that  I have severe muscle aches , numbness in my arms and legs when I sleep, one occurrence of blurred vision in my left eye, and itchiness  No symptoms currently besides the muscle pain  Answer Assessment - Initial Assessment Questions  1  NAME of MEDICATION: "What medicine are you calling about?"      Percocet  2  QUESTION: "What is your question?"      Im having multiple symptoms from this medication   I would like an alternative  3  PRESCRIBING HCP: "Who prescribed it?" Reason: if prescribed by specialist, call should be referred to that group  Surgeon  4  SYMPTOMS: "Do you have any symptoms?"     I am taking percocet and I believe I'm experiencing some side effects to that  I have severe muscle aches , numbness in my arms and legs when I sleep, one occurrence of blurred vision in my left eye, and itchiness  Only muscle pain at time of triage  5  SEVERITY: If symptoms are present, ask "Are they mild, moderate or severe?"      No symptoms currently besides the muscle pain      Protocols used: MEDICATION QUESTION CALL-ADULT-[facility], POST-OP SYMPTOMS AND QUESTIONS-ADULT-[facility]

## 2021-03-14 NOTE — TELEPHONE ENCOUNTER
Regarding: Percocet Side Effects  ----- Message from Fredrick Silverio sent at 3/14/2021 11:35 AM EDT -----  "I am taking percocet and I believe I'm experiencing some side effects to that   I have severe muscle stiffness, numbness in my arms and legs when I sleep, one occurrence of blurred vision in my left eye, and itchiness "

## 2021-03-15 DIAGNOSIS — G47.09 OTHER INSOMNIA: Primary | Chronic | ICD-10-CM

## 2021-03-15 RX ORDER — ZOLPIDEM TARTRATE 10 MG/1
10 TABLET ORAL
Qty: 30 TABLET | Refills: 0 | Status: SHIPPED | OUTPATIENT
Start: 2021-03-15 | End: 2021-03-23 | Stop reason: SDUPTHER

## 2021-03-15 NOTE — PSYCH
MEDICATION MANAGEMENT NOTE        [facility]      Name and Date of Birth:  Jorge Moreno 49 y o  [birth year] MRN: [id number]    Date of Visit: March 23, 2021    Reason for Visit:   Chief Complaint   Patient presents with    Medication Management    Follow-up       SUBJECTIVE:    Mansoor Jackson is seen today for a follow up for Major Depressive Disorder, Generalized Anxiety Disorder and insomnia  He has experienced ongoing symptoms since the last visit  He still feels depressed, rates mood as 7 on a scale of 1 (best mood) to 10 (worst mood)  He continues to experience on and off anxiety symptoms  He remains somatic and preoccupied with physical issues - had cholecystectomy last week due to "different symptoms  My gallbladder was not functioning"  He had his first COVID vaccine last week "I had side effects from that, muscle aches and joint pain"  He denies any suicidal ideation, intent or plan at present; denies any homicidal ideation, intent or plan at present  He has no auditory hallucinations, denies any visual hallucinations, no overt delusions noted  He reports sweating  Denies any other side effects from current psychiatric medications  HPI ROS Appetite Changes and Sleep:     He reports difficulty falling asleep, decrease in number of sleep hours (3 hours), decreased appetite, recent weight loss (5 lbs), low energy    Current Rating Scores:     Current PHQ-9   PHQ-9 Score (since 2/20/2021)     PHQ-9 Score  20        Current PHQ-9 score is increased from 16 at the last visit)      Review Of Systems:      Constitutional low energy and recent weight loss (5 lbs)   ENT abnormal taste   Cardiovascular negative   Respiratory negative   Gastrointestinal abdominal discomfort   Genitourinary negative   Musculoskeletal negative   Integumentary negative   Neurological neuropathic pain   Endocrine negative   Other Symptoms none, all other systems are negative Past Psychiatric History: (unchanged information from previous note copied and updated)    Past Inpatient Psychiatric Treatment:   No history of past inpatient psychiatric admissions  Past Outpatient Psychiatric Treatment:    In outpatient treatment at [facility] [address] since 2/2017  Past Suicide Attempts: no  Past Violent Behavior: no  Past Psychiatric Medication Trials: Lexapro, Zoloft, Wellbutrin XL, Trazodone, Abilify, Ambien, Cymbalta, Buspar and Melatonin    Traumatic History: (unchanged information from previous note copied and updated)    Abuse: no history of sexual abuse, no history of physical abuse  Other Traumatic Events: none     Past Medical History:    Past Medical History:   Diagnosis Date    Adrenal adenoma     Arthritis     Carpal tunnel syndrome     Chronic back pain     Chronic pain disorder     CPAP (continuous positive airway pressure) dependence     Cubital tunnel syndrome     Fatigue     Fatty liver     Folliculitis     Hemorrhoid     Liver lesion     Lumbago     Morbid obesity (HCC)     Myofascial pain syndrome     Neuropathy     Scalp lesion     Sleep apnea     CPAP dependance    Sleep difficulties     Thickening of wall of gallbladder     Tinea capitis     Ulnar 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     Umbilical hernia     Vitamin D deficiency      Past Medical History Pertinent Negatives:   Diagnosis Date Noted    Head injury     Seizures ([facility] [address] )      Past Surgical History:   Procedure Laterality Date    CARPAL TUNNEL RELEASE Bilateral     COLONOSCOPY      CYST REMOVAL      Scalp    EGD      EYE MUSCLE SURGERY Bilateral     as a child for amblyopia    HEMORROIDECTOMY      HERNIA REPAIR      KNEE ARTHROSCOPY Right     PATELLA SURGERY Right     scraped out scar tissue per pt     ID COLONOSCOPY FLX DX W/COLLJ SPEC WHEN PFRMD N/A 2/10/2016    Procedure: COLONOSCOPY;  Surgeon: Sherri Gonzalez MD;  [facility];   Service: Colorectal    ID EGD TRANSORAL BIOPSY SINGLE/MULTIPLE N/A 8/23/2017    Procedure: ESOPHAGOGASTRODUODENOSCOPY (EGD) with bx;  Surgeon: Linnette Anne MD;  [facility]: [facility];   Service: Bariatrics    DE EXC SKIN BENIG 0 6-1 CM REMAINDR BODY N/A 12/8/2016    Procedure: EXCISION OF SCALP LESION;  Surgeon: Kelly Dalton MD;  [facility];  Service: General    DE HEMORRHOIDECTOMY,INT/EXT, 2+ COLUMNS/GROUPS N/A 2/12/2016    Procedure: HEMORRHOIDECTOMY EXCISION, EUA, Anal fistulotomy;  Surgeon: Philippe Perez MD;  [facility];  Service: Colorectal    DE LAP,CHOLECYSTECTOMY N/A 3/11/2021    Procedure: CHOLECYSTECTOMY LAPAROSCOPIC;  Surgeon: Manny Mcclure MD;  [facility];  Service: General    DE REPAIR UMBILICAL VPNN,3+T/D,ZIUUD N/A 3/11/2021    Procedure: REPAIR HERNIA UMBILICAL RECURRENT;  Surgeon: Manny Mcclure MD;  [facility];  Service: General    UMBILICAL HERNIA REPAIR       Allergies   Allergen Reactions    Penicillins Rash       Substance Abuse History:    Social History     Substance and Sexual Activity   Alcohol Use Not Currently    Frequency: Monthly or less    Drinks per session: 1 or 2    Binge frequency: Never    Comment: Social alcohol use     Social History     Substance and Sexual Activity   Drug Use No    Comment: Denies       Social History:    Social History     Socioeconomic History    Marital status: Legally      Spouse name: Not on file    Number of children: 1    Years of education: some college    Highest education level: Some college, no degree   Occupational History    Occupation: on disability   Social Needs    Financial resource strain: Not hard at all   [employer] insecurity     Worry: Never true     Inability: Never true    Transportation needs     Medical: No     Non-medical: No   Tobacco Use    Smoking status: Never Smoker    Smokeless tobacco: Never Used   Substance and Sexual Activity    Alcohol use: Not Currently     Frequency: Monthly or less     Drinks per session: 1 or 2     Binge frequency: Never     Comment: Social alcohol use    Drug use: No     Comment: Denies    Sexual activity: Yes     Partners: Female   Lifestyle    Physical activity     Days per week: 1 day     Minutes per session: 30 min    Stress: To some extent   Relationships    Social connections     Talks on phone: Never     Gets together: Never     Attends Mandaeism service: Never     Active member of club or organization: No     Attends meetings of clubs or organizations: Never     Relationship status:     Intimate partner violence     Fear of current or ex partner: No     Emotionally abused: No     Physically abused: No     Forced sexual activity: No   Other Topics Concern    Not on file   Social History Narrative    Education: some college    Learning Disabilities: none    Marital History:     Children: 1 adult son    Living Arrangement: lives in home with parents    Occupational History: on disability, 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    Functioning Relationships: good support system    Legal History: none     History: None        Most recent tobacco use screenin2018       Family Psychiatric History:     Family History   Problem Relation Age of Onset    Cancer Mother     Diabetes type II Father     Depression Sister     Suicide Attempts Sister     No Known Problems Sister     Substance Abuse Neg Hx        History Review:  The following portions of the patient's history were reviewed and updated as appropriate: allergies, current medications, past family history, past medical history, past social history, past surgical history and problem list          OBJECTIVE:     Vital signs in last 24 hours:    Vitals:    21 1537   Weight: 136 kg (299 lb)   Height: 5' 10" (1 778 m)       Mental Status Evaluation:    Appearance age appropriate, casually dressed   Behavior cooperative, appears anxious   Speech normal rate, normal volume, normal pitch   Mood anxious, less depressed   Affect constricted   Thought Processes organized, goal directed   Associations intact associations   Thought Content no overt delusions   Perceptual Disturbances: no auditory hallucinations, no visual hallucinations   Abnormal Thoughts  Risk Potential Suicidal ideation - None  Homicidal ideation - None  Potential for aggression - No   Orientation oriented to person, place, time/date and situation   Memory recent and remote memory grossly intact   Consciousness alert and awake   Attention Span Concentration Span attention span and concentration appear shorter than expected for age   Intellect appears to be of average intelligence   Insight intact   Judgement intact   Muscle Strength and  Gait normal muscle strength and normal muscle tone, normal gait and normal balance   Motor activity no abnormal movements   Language no difficulty naming common objects, no difficulty repeating a phrase, no difficulty writing a sentence   Fund of Knowledge adequate knowledge of current events  adequate fund of knowledge regarding past history  adequate fund of knowledge regarding vocabulary    Pain mild   Pain Scale 4       Laboratory Results: I have personally reviewed all pertinent laboratory/tests results    Recent Labs (last 4 months):    Admission on 03/11/2021, Discharged on 03/11/2021   Component Date Value    Case Report 03/11/2021                      Value:Surgical Pathology Report                         Case: [id number]                                   Authorizing Provider:  Karene Phalen, MD          Collected:           03/11/2021 9210              Ordering Location:     [facility]   Received:            03/11/2021 1630                                     Operating Room                                                               Pathologist:           Erma Borges MD Specimen:    Gallbladder, gallbladder                                                                   Final Diagnosis 03/11/2021                      Value: This result contains rich text formatting which cannot be displayed here   Additional Information 03/11/2021                      Value: This result contains rich text formatting which cannot be displayed here  [facility] Description 03/11/2021                      Value: This result contains rich text formatting which cannot be displayed here     Appointment on 02/26/2021   Component Date Value    WBC 02/26/2021 7 62     RBC 02/26/2021 5 02     Hemoglobin 02/26/2021 14 7     Hematocrit 02/26/2021 44 2     MCV 02/26/2021 88     MCH 02/26/2021 29 3     MCHC 02/26/2021 33 3     RDW 02/26/2021 12 8     MPV 02/26/2021 9 4     Platelets 62/87/1835 259     nRBC 02/26/2021 0     Neutrophils Relative 02/26/2021 57     Immat GRANS % 02/26/2021 0     Lymphocytes Relative 02/26/2021 30     Monocytes Relative 02/26/2021 10     Eosinophils Relative 02/26/2021 2     Basophils Relative 02/26/2021 1     Neutrophils Absolute 02/26/2021 4 36     Immature Grans Absolute 02/26/2021 0 03     Lymphocytes Absolute 02/26/2021 2 31     Monocytes Absolute 02/26/2021 0 74     Eosinophils Absolute 02/26/2021 0 13     Basophils Absolute 02/26/2021 0 05     Sodium 02/26/2021 138     Potassium 02/26/2021 4 2     Chloride 02/26/2021 104     CO2 02/26/2021 26     ANION GAP 02/26/2021 8     BUN 02/26/2021 20     Creatinine 02/26/2021 1 20     Glucose, Fasting 02/26/2021 121*    Calcium 02/26/2021 8 3     AST 02/26/2021 20     ALT 02/26/2021 32     Alkaline Phosphatase 02/26/2021 68     Total Protein 02/26/2021 7 0     Albumin 02/26/2021 3 5     Total Bilirubin 02/26/2021 1 10*    eGFR 02/26/2021 68    Office Visit on 02/26/2021   Component Date Value    Ventricular Rate 02/26/2021 80     Atrial Rate 02/26/2021 80     AL Interval 02/26/2021 170     QRSD Interval 02/26/2021 86     QT Interval 02/26/2021 390     QTC Interval 02/26/2021 450     P Axis 02/26/2021 70     QRS Axis 02/26/2021 32     T Wave Axis 02/26/2021 27    Lab on 12/05/2020   Component Date Value    Cortisol - AM 12/05/2020 13 0     TSH 3RD GENERATON 12/05/2020 2 719     Free T4 12/05/2020 1 20     Hemoglobin A1C 12/05/2020 5 9*    EAG 12/05/2020 123     Vitamin B-12 12/05/2020 436     Vit D, 25-Hydroxy 12/05/2020 11 0*    A/G Ratio 12/05/2020 1 65     Albumin Electrophoresis 12/05/2020 62 3     Albumin CONC 12/05/2020 4 17     Alpha 1 12/05/2020 4 1     ALPHA 1 CONC 12/05/2020 0 27     Alpha 2 12/05/2020 9 8     ALPHA 2 CONC 12/05/2020 0 66     Beta-1 12/05/2020 5 9     BETA 1 CONC 12/05/2020 0 40     Beta-2 12/05/2020 5 0     BETA 2 CONC 12/05/2020 0 34     Gamma Globulin 12/05/2020 12 9     GAMMA CONC 12/05/2020 0 86     Total Protein 12/05/2020 6 7     SPEP Interpretation 12/05/2020 No monoclonal bands noted  Reviewed by: Tristan Montalvo MD  **Electronic Signature**     Lyme total antibody 12/05/2020 6     SS-A (RO) Ab 12/05/2020 <0 2     SS-B (LA) Ab 12/05/2020 <0 2     SARS-CoV-2 Ab, IgG 12/05/2020 Comment     SARS-CoV-2 Ab, IgG 12/05/2020 Negative        Suicide/Homicide Risk Assessment:    Risk of Harm to Self:  Demographic risk factors include: , age: over 48 or older  Historical Risk Factors include: chronic depressive symptoms, chronic anxiety symptoms  Recent Specific Risk Factors include: diagnosis of depression, current anxiety symptoms  Protective Factors: no current suicidal ideation, compliant with medications, compliant with mental health treatment, stable living environment, supportive parents  Weapons: gun  The following steps have been taken to ensure weapons are properly secured: locked  Based on today's assessment, Lizbeth Krueger presents the following risk of harm to self: low    Risk of Harm to Others:   The following ratings are based on assessment at the time of the interview  Based on today's assessment, Niki Matta presents the following risk of harm to others: none    The following interventions are recommended: no intervention changes needed    Assessment/Plan:       Diagnoses and all orders for this visit:    Major depressive disorder, recurrent, severe without psychotic features ([address] )  -     venlafaxine (EFFEXOR-XR) 150 mg 24 hr capsule; Take 1 capsule (150 mg total) by mouth daily    ALYSHA (generalized anxiety disorder)  -     gabapentin (NEURONTIN) 300 mg capsule; Take 1 capsule (300 mg total) by mouth 3 (three) times a day  -     venlafaxine (EFFEXOR-XR) 150 mg 24 hr capsule; Take 1 capsule (150 mg total) by mouth daily    Other insomnia  -     zolpidem (AMBIEN) 10 mg tablet; Take 1 tablet (10 mg total) by mouth daily at bedtime as needed for sleep To be filled on or after 4/14/21          Treatment Recommendations/Precautions:    Increase Effexor  mg daily to improve depressive symptoms  Continue Ambien 10 mg at bedtime as needed to help with insomnia  Increase Neurontin to 300 mg three times a day to improve anxiety symptoms and neuropathy  Medication management every 2 months  Referral for individual psychotherapy  Follows with family physician for yearly physical exam, chronic pain and gastrointestinal issues  Aware of 24 hour and weekend coverage for urgent situations accessed by calling [facility] main practice number    Medications Risks/Benefits      Risks, Benefits And Possible Side Effects Of Medications:    Risks, benefits, and possible side effects of medications explained to Niki Matta including risk of suicidality and serotonin syndrome related to treatment with antidepressants and risk of impaired next-day mental alertness, complex sleep-related behavior and dependence related to treatment with hypnotic medications  He verbalizes understanding and agreement for treatment      Controlled Medication Discussion: Safia Leroy has been filling controlled prescriptions on time as prescribed according to [address] [place] [address] [employer] Monitoring Program    Psychotherapy Provided:     Individual psychotherapy provided: Yes  Counseling was provided during the session today for 16 minutes  Medications, treatment progress and treatment plan reviewed with Safia Leroy  Medication changes discussed with Safia Leroy  Medication education provided to Safia Leroy  Goals discussed during in session: improve control of anxiety, help with depression and improve sleep  Discussed with Safia Leroy coping with health issues and ongoing anxiety  Coping techniques including exercising and increasing motivation reviewed with Safia Leroy  Supportive therapy provided  Treatment Plan:    Completed and signed during the session: Yes - Treatment Plan done but not signed at time of office visit due to:  Plan reviewed in person and verbal consent given due to [place] social distancing    Note Share: This note was shared with patient      Giovana Perla MD 03/23/21

## 2021-03-19 ENCOUNTER — IMMUNIZATIONS (OUTPATIENT)
Dept: FAMILY MEDICINE CLINIC | Facility: HOSPITAL | Age: 56
End: 2021-03-19

## 2021-03-19 DIAGNOSIS — Z23 ENCOUNTER FOR IMMUNIZATION: Primary | ICD-10-CM

## 2021-03-19 PROCEDURE — 91300 SARS-COV-2 / COVID-19 MRNA VACCINE (PFIZER-BIONTECH) 30 MCG: CPT

## 2021-03-19 PROCEDURE — 0001A SARS-COV-2 / COVID-19 MRNA VACCINE (PFIZER-BIONTECH) 30 MCG: CPT

## 2021-03-22 ENCOUNTER — TELEPHONE (OUTPATIENT)
Dept: SURGERY | Facility: CLINIC | Age: 56
End: 2021-03-22

## 2021-03-22 NOTE — TELEPHONE ENCOUNTER
Attempted to call and left a message  When patient calls back, please let him know his appt with Dr Diana Jesus has been rescheduled for 03/25 with 8:15 with Dr Ariella Jesus will not be in the office for the next 2 weeks

## 2021-03-23 ENCOUNTER — OFFICE VISIT (OUTPATIENT)
Dept: PSYCHIATRY | Facility: CLINIC | Age: 56
End: 2021-03-23
Payer: MEDICARE

## 2021-03-23 VITALS — WEIGHT: 299 LBS | HEIGHT: 70 IN | BODY MASS INDEX: 42.8 KG/M2

## 2021-03-23 DIAGNOSIS — F41.1 GAD (GENERALIZED ANXIETY DISORDER): Chronic | ICD-10-CM

## 2021-03-23 DIAGNOSIS — F33.2 MAJOR DEPRESSIVE DISORDER, RECURRENT, SEVERE WITHOUT PSYCHOTIC FEATURES (HCC): Primary | Chronic | ICD-10-CM

## 2021-03-23 DIAGNOSIS — G47.09 OTHER INSOMNIA: Chronic | ICD-10-CM

## 2021-03-23 PROCEDURE — 90833 PSYTX W PT W E/M 30 MIN: CPT | Performed by: PSYCHIATRY & NEUROLOGY

## 2021-03-23 PROCEDURE — 99214 OFFICE O/P EST MOD 30 MIN: CPT | Performed by: PSYCHIATRY & NEUROLOGY

## 2021-03-23 RX ORDER — GABAPENTIN 300 MG/1
300 CAPSULE ORAL 3 TIMES DAILY
Qty: 90 CAPSULE | Refills: 4 | Status: SHIPPED | OUTPATIENT
Start: 2021-03-23 | End: 2021-10-15 | Stop reason: SDUPTHER

## 2021-03-23 RX ORDER — ZOLPIDEM TARTRATE 10 MG/1
10 TABLET ORAL
Qty: 30 TABLET | Refills: 3 | Status: SHIPPED | OUTPATIENT
Start: 2021-04-14 | End: 2021-08-17 | Stop reason: SDUPTHER

## 2021-03-23 RX ORDER — VENLAFAXINE HYDROCHLORIDE 150 MG/1
150 CAPSULE, EXTENDED RELEASE ORAL DAILY
Qty: 30 CAPSULE | Refills: 4 | Status: SHIPPED | OUTPATIENT
Start: 2021-03-23 | End: 2021-10-11

## 2021-03-23 NOTE — BH TREATMENT PLAN
TREATMENT PLAN (Medication Management Only)        [facility]    Name/Date of Birth/MRN:  Alfredo Morataya 49 y o  [birth year] MRN: [id number]  Date of Treatment Plan: March 23, 2021  Diagnosis/Diagnoses:   1  Major depressive disorder, recurrent, severe without psychotic features ([facility] [address] )    2  ALYSHA (generalized anxiety disorder)    3  Other insomnia      Strengths/Personal Resources for Self-Care: "willingness to get better"  Area/Areas of need (in own words): "start therapy"  1  Long Term Goal:   improve control of anxiety, help with depression  Target Date: 2 months - 5/23/2021  Person/Persons responsible for completion of goal: Champ Doom  2  Short Term Objective (s) - How will we reach this goal?:   A  Provider new recommended medication/dosage changes and/or continue medication(s): increase Effexor XR and Neurontin, continue all other medications (Ambien)  B   N/A   C   N/A  Target Date: 2 months - 5/23/2021  Person/Persons Responsible for Completion of Goal: Ever Steven   Progress Towards Goals: limited progress  Treatment Modality: medication management every 2 months, referral for individual psychotherapy  Review due 180 days from date of this plan: 6 months - 9/23/2021  Expected length of service: ongoing treatment unless revised  My Physician/PA/NP and I have developed this plan together and I agree to work on the goals and objectives  I understand the treatment goals that were developed for my treatment    Electronic Signatures: on file (unless signed below)    Shobha Ochoa MD 03/23/21

## 2021-03-25 ENCOUNTER — OFFICE VISIT (OUTPATIENT)
Dept: SURGERY | Facility: CLINIC | Age: 56
End: 2021-03-25

## 2021-03-25 VITALS
HEART RATE: 75 BPM | DIASTOLIC BLOOD PRESSURE: 80 MMHG | BODY MASS INDEX: 43.19 KG/M2 | WEIGHT: 301 LBS | SYSTOLIC BLOOD PRESSURE: 126 MMHG | TEMPERATURE: 97.5 F

## 2021-03-25 DIAGNOSIS — K82.8 BILIARY DYSKINESIA: Primary | ICD-10-CM

## 2021-03-25 PROCEDURE — 99024 POSTOP FOLLOW-UP VISIT: CPT | Performed by: SURGERY

## 2021-03-25 NOTE — PATIENT INSTRUCTIONS
Shower daily and put Neosporin and a Band-Aid on for couple days for the uppermost port site  Stop taking the Colace and take Metamucil once a day  If after a week this does not help, go to twice a day    Return in 2 weeks to see Dr Yuli Barba

## 2021-03-25 NOTE — PROGRESS NOTES
1st postoperative visit after a cholecystectomy done for dyskinesia as well as repair of a small umbilical hernia  The patient now complains of constipation more so than anything else  States that the pain still occasionally is in the right upper quadrant but usually goes to the left side and over his descending colon  He is on a stool softener but I think it is Colace  On examination his abdomen is nondistended  All wounds are healed  There is no recurrence of hernia  The upper medial most port site obviously broke down  I took off the glue but some Neosporin on it told him the simply wash it  I told him to stop the Colace and go on Metamucil    He is to return in 2 weeks to see Dr Diana Jesus

## 2021-04-05 ENCOUNTER — TELEPHONE (OUTPATIENT)
Dept: PSYCHIATRY | Facility: CLINIC | Age: 56
End: 2021-04-05

## 2021-04-05 NOTE — TELEPHONE ENCOUNTER
----- Message from Daniele Bunch sent at 4/5/2021 12:50 PM EDT -----  Regarding: NP for therapy - sees [facility]  Dr Mihai Carmen MD referred patient for therapy services      Please add to waitlist

## 2021-04-05 NOTE — TELEPHONE ENCOUNTER
[facility] to [facility] for follow up via [facility] on 4/5/2021  Encounter created for reference

## 2021-04-06 ENCOUNTER — TELEPHONE (OUTPATIENT)
Dept: PSYCHIATRY | Facility: CLINIC | Age: 56
End: 2021-04-06

## 2021-04-06 NOTE — TELEPHONE ENCOUNTER
----- Message from Gio Hartman sent at 4/5/2021 12:50 PM EDT -----  Regarding: NP for therapy - sees [facility]  Dr Julita Lea MD referred patient for therapy services      Please add to waitlist

## 2021-04-09 ENCOUNTER — IMMUNIZATIONS (OUTPATIENT)
Dept: FAMILY MEDICINE CLINIC | Facility: HOSPITAL | Age: 56
End: 2021-04-09

## 2021-04-09 DIAGNOSIS — Z23 ENCOUNTER FOR IMMUNIZATION: Primary | ICD-10-CM

## 2021-04-09 PROCEDURE — 91300 SARS-COV-2 / COVID-19 MRNA VACCINE (PFIZER-BIONTECH) 30 MCG: CPT

## 2021-04-09 PROCEDURE — 0002A SARS-COV-2 / COVID-19 MRNA VACCINE (PFIZER-BIONTECH) 30 MCG: CPT

## 2021-04-14 ENCOUNTER — OFFICE VISIT (OUTPATIENT)
Dept: SURGERY | Facility: CLINIC | Age: 56
End: 2021-04-14

## 2021-04-14 VITALS
WEIGHT: 312 LBS | TEMPERATURE: 97.9 F | HEART RATE: 84 BPM | BODY MASS INDEX: 44.67 KG/M2 | HEIGHT: 70 IN | RESPIRATION RATE: 18 BRPM | DIASTOLIC BLOOD PRESSURE: 78 MMHG | SYSTOLIC BLOOD PRESSURE: 142 MMHG

## 2021-04-14 DIAGNOSIS — K82.8 BILIARY DYSKINESIA: Primary | ICD-10-CM

## 2021-04-14 PROCEDURE — 99024 POSTOP FOLLOW-UP VISIT: CPT | Performed by: SURGERY

## 2021-04-14 NOTE — PROGRESS NOTES
Assessment/Plan:  He is doing very well  He can return to his normal work  No restrictions  Follow-up with me p r n  No problem-specific Assessment & Plan notes found for this encounter  Diagnoses and all orders for this visit:    Biliary dyskinesia          Subjective:      Patient ID: Bo Hoff is a 49 y o  male  53-year-old male patient who is 4 weeks status post laparoscopic cholecystectomy with recurrent umbilical hernia repair  He is doing well  He said he was on a vacation recently  No complaints of abdominal pain  Tolerating diet  The following portions of the patient's history were reviewed and updated as appropriate: allergies, current medications, past medical history, past social history, past surgical history and problem list     Review of Systems   All other systems reviewed and are negative  Objective:      /78 (BP Location: Right arm, Patient Position: Sitting, Cuff Size: Adult)   Pulse 84   Temp 97 9 °F (36 6 °C)   Resp 18   Ht 5' 10" (1 778 m)   Wt (!) 142 kg (312 lb)   BMI 44 77 kg/m²          Physical Exam  Abdominal:      Comments: All incisions healed well  No tenderness  No recurrence of hernia

## 2021-04-29 ENCOUNTER — TELEPHONE (OUTPATIENT)
Dept: PSYCHIATRY | Facility: CLINIC | Age: 56
End: 2021-04-29

## 2021-05-13 ENCOUNTER — OFFICE VISIT (OUTPATIENT)
Dept: FAMILY MEDICINE CLINIC | Facility: CLINIC | Age: 56
End: 2021-05-13
Payer: MEDICARE

## 2021-05-13 VITALS
HEIGHT: 70 IN | SYSTOLIC BLOOD PRESSURE: 120 MMHG | HEART RATE: 82 BPM | TEMPERATURE: 97.2 F | BODY MASS INDEX: 42.66 KG/M2 | WEIGHT: 298 LBS | OXYGEN SATURATION: 97 % | RESPIRATION RATE: 16 BRPM | DIASTOLIC BLOOD PRESSURE: 82 MMHG

## 2021-05-13 DIAGNOSIS — E55.9 VITAMIN D DEFICIENCY: ICD-10-CM

## 2021-05-13 DIAGNOSIS — Z13.1 SCREENING FOR DIABETES MELLITUS: ICD-10-CM

## 2021-05-13 DIAGNOSIS — L98.9 SKIN LESION: ICD-10-CM

## 2021-05-13 DIAGNOSIS — Z00.00 MEDICARE ANNUAL WELLNESS VISIT, SUBSEQUENT: Primary | ICD-10-CM

## 2021-05-13 DIAGNOSIS — Z12.5 PROSTATE CANCER SCREENING: ICD-10-CM

## 2021-05-13 DIAGNOSIS — R43.0 ANOSMIA: ICD-10-CM

## 2021-05-13 DIAGNOSIS — Z13.6 SCREENING FOR CARDIOVASCULAR CONDITION: ICD-10-CM

## 2021-05-13 DIAGNOSIS — R73.03 PREDIABETES: ICD-10-CM

## 2021-05-13 PROCEDURE — G0439 PPPS, SUBSEQ VISIT: HCPCS | Performed by: FAMILY MEDICINE

## 2021-05-13 RX ORDER — NAPROXEN SODIUM 220 MG
220 TABLET ORAL 2 TIMES DAILY WITH MEALS
COMMUNITY
End: 2022-05-12 | Stop reason: ALTCHOICE

## 2021-05-13 NOTE — PROGRESS NOTES
Assessment and Plan:     Problem List Items Addressed This Visit        Musculoskeletal and Integument    Skin lesion     Pt has dark skin lesion R posterior leg for past 3-4 mths  Will refer to Derm  Relevant Orders    Ambulatory referral to Dermatology       Other    Vitamin D deficiency     Recheck level  Not on med at present  Relevant Orders    Vitamin D 25 hydroxy    Prediabetes     Recheck A1C  Advised pt to follow a low carb diet and to exercise on a regular basis  Relevant Orders    Hemoglobin A1C    Glucose, fasting    Medicare annual wellness visit, subsequent - Primary     Wellness exam done  Had COVID vaccines  Recommend Tdap, shingrix, and yearly flu shot in the fall  Will check FBS, lipids, and PSA  Had colonoscopy in Nov 2019  No recent falls  Pt is mildly depressed  5 Wishes Handout given  Anosmia     Pt has had it for past 2 yrs and not getting better  Refer to ENT  Relevant Orders    Ambulatory Referral to Otolaryngology      Other Visit Diagnoses     Screening for cardiovascular condition        Relevant Orders    Lipid panel    Screening for diabetes mellitus        Prostate cancer screening        Relevant Orders    PSA, Total Screen        BMI Counseling: Body mass index is 42 76 kg/m²  The BMI is above normal  Nutrition recommendations include decreasing portion sizes, encouraging healthy choices of fruits and vegetables, consuming healthier snacks and moderation in carbohydrate intake  Exercise recommendations include exercising 3-5 times per week  No pharmacotherapy was ordered  Preventive health issues were discussed with patient, and age appropriate screening tests were ordered as noted in patient's After Visit Summary  Personalized health advice and appropriate referrals for health education or preventive services given if needed, as noted in patient's After Visit Summary       History of Present Illness:     Patient presents for Medicare Annual Wellness visit    Patient Care Team:  Landon Birch MD as PCP - General  MD Kimberlyn Samrt MD Alden Minerva, CRNP Aida Ebbs, MD Lynne Reichmann, Larena Grade, MD Cheril Mares, MD Lorette Cleverly, MD as Endoscopist     Problem List:     Patient Active Problem List   Diagnosis    Major depressive disorder, recurrent, severe without psychotic features ([facility] [address] )    ALYSHA (generalized anxiety disorder)    Other insomnia    Adrenal adenoma    Chronic right-sided thoracic back pain    Cubital tunnel syndrome of both upper extremities    CMC arthritis, thumb, degenerative    Intercostal neuralgia    Low back pain    Morbid obesity ([facility] [address] )    Myofascial pain syndrome    Sleep apnea    Thoracic compression fracture (HCC)    Tinea capitis    Ulnar 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 of left upper extremity    Vitamin D deficiency    Neuropathy    Tension headache    Prediabetes    Medicare annual wellness visit, subsequent    Skin lesion    Anosmia      Past Medical and Surgical History:     Past Medical History:   Diagnosis Date    Adrenal adenoma     Arthritis     Carpal tunnel syndrome     Chronic back pain     Chronic pain disorder     CPAP (continuous positive airway pressure) dependence     Cubital tunnel syndrome     Fatigue     Fatty liver     Folliculitis     Hemorrhoid     Liver lesion     Lumbago     Morbid obesity ([facility] [address] )     Myofascial pain syndrome     Neuropathy     Scalp lesion     Sleep apnea     CPAP dependance    Sleep difficulties     Thickening of wall of gallbladder     Tinea capitis     Ulnar 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     Umbilical hernia     Vitamin D deficiency      Past Surgical History:   Procedure Laterality Date    CARPAL TUNNEL RELEASE Bilateral     CHOLECYSTECTOMY      COLONOSCOPY      CYST REMOVAL      Scalp    EGD      EYE MUSCLE SURGERY Bilateral     as a child for amblyopia    HEMORROIDECTOMY  HERNIA REPAIR      KNEE ARTHROSCOPY Right     PATELLA SURGERY Right     scraped out scar tissue per pt     OH COLONOSCOPY FLX DX W/COLLJ SPEC WHEN PFRMD N/A 2/10/2016    Procedure: COLONOSCOPY;  Surgeon: Edu Bernstein MD;  [facility]; Service: Colorectal    OH EGD TRANSORAL BIOPSY SINGLE/MULTIPLE N/A 8/23/2017    Procedure: ESOPHAGOGASTRODUODENOSCOPY (EGD) with bx;  Surgeon: Evelia Resendez MD;  [facility];   Service: Bariatrics    OH EXC SKIN BENIG 0 6-1 CM REMAINDR BODY N/A 12/8/2016    Procedure: EXCISION OF SCALP LESION;  Surgeon: Liliana Styles MD;  [facility];  Service: General    OH HEMORRHOIDECTOMY,INT/EXT, 2+ COLUMNS/GROUPS N/A 2/12/2016    Procedure: HEMORRHOIDECTOMY EXCISION, EUA, Anal fistulotomy;  Surgeon: Edu Bernstein MD;  [facility];  Service: Colorectal    OH LAP,CHOLECYSTECTOMY N/A 3/11/2021    Procedure: CHOLECYSTECTOMY LAPAROSCOPIC;  Surgeon: Maryann Leos MD;  [facility];  Service: General    OH REPAIR UMBILICAL SEMC,5+R/J,IJAVC N/A 3/11/2021    Procedure: REPAIR HERNIA UMBILICAL RECURRENT;  Surgeon: Maryann Leos MD;  [facility];  Service: General    UMBILICAL HERNIA REPAIR        Family History:     Family History   Problem Relation Age of Onset    Cancer Mother     Diabetes type II Father     Depression Sister     Suicide Attempts Sister     No Known Problems Sister     Substance Abuse Neg Hx       Social History:     E-Cigarette/Vaping    E-Cigarette Use Never User      E-Cigarette/Vaping Substances    Nicotine No     THC No     CBD No     Flavoring No     Other No     Unknown No      Social History     Socioeconomic History    Marital status: Legally      Spouse name: None    Number of children: 1    Years of education: some college    Highest education level: Some college, no degree   Occupational History    Occupation: on disability   Social Needs    Financial resource strain: Not hard at all    Food insecurity     Worry: Never true     Inability: Never true    Transportation needs     Medical: No     Non-medical: No   Tobacco Use    Smoking status: Never Smoker    Smokeless tobacco: Never Used   Substance and Sexual Activity    Alcohol use: Yes     Frequency: Monthly or less     Drinks per session: 1 or 2     Binge frequency: Never     Comment: Social alcohol use    Drug use: Never    Sexual activity: Yes     Partners: Female   Lifestyle    Physical activity     Days per week: 1 day     Minutes per session: 30 min    Stress:  To some extent   Relationships    Social connections     Talks on phone: Never     Gets together: Never     Attends Taoism service: Never     Active member of club or organization: No     Attends meetings of clubs or organizations: Never     Relationship status:     Intimate partner violence     Fear of current or ex partner: No     Emotionally abused: No     Physically abused: No     Forced sexual activity: No   Other Topics Concern    None   Social History Narrative    Education: some college    Learning Disabilities: none    Marital History:     Children: 1 adult son    Living Arrangement: lives in home with parents    Occupational History: on disability, 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    [address] [facility]: good support system    Legal History: none     History: None        Most recent tobacco use screenin2018      Medications and Allergies:     Current Outpatient Medications   Medication Sig Dispense Refill    divalproex sodium (DEPAKOTE) 500 mg EC tablet 500 mg nightly to try and stop headache for 3-5 nights 5 tablet 3    gabapentin (NEURONTIN) 300 mg capsule Take 1 capsule (300 mg total) by mouth 3 (three) times a day 90 capsule 4    naproxen sodium (Aleve) 220 MG tablet Take 220 mg by mouth 2 (two) times a day with meals      venlafaxine (EFFEXOR-XR) 150 mg 24 hr capsule Take 1 capsule (150 mg total) by mouth daily 30 capsule 4    zolpidem (AMBIEN) 10 mg tablet Take 1 tablet (10 mg total) by mouth daily at bedtime as needed for sleep To be filled on or after 4/14/21 30 tablet 3     No current facility-administered medications for this visit  Allergies   Allergen Reactions    Penicillins Rash      Immunizations:     Immunization History   Administered Date(s) Administered    SARS-CoV-2 / COVID-19 mRNA IM (Pf[employer]) 03/19/2021, 04/09/2021      Health Maintenance:         Topic Date Due    HIV Screening  Never done    Colonoscopy Surveillance  11/07/2024    Colorectal Cancer Screening  11/07/2029         Topic Date Due    DTaP,Tdap,and Td Vaccines (1 - Tdap) Never done      Medicare Health Risk Assessment:     /82 (BP Location: Left arm, Patient Position: Sitting, Cuff Size: Large)   Pulse 82   Temp (!) 97 2 °F (36 2 °C) (Temporal)   Resp 16   Ht 5' 10" (1 778 m)   Wt 135 kg (298 lb)   SpO2 97%   BMI 42 76 kg/m²      Negra Renee is here for his Subsequent Wellness visit  Health Risk Assessment:   Patient rates overall health as fair  Patient feels that their physical health rating is slightly better  Patient is dissatisfied with their life  Eyesight was rated as slightly worse  Hearing was rated as same  Patient feels that their emotional and mental health rating is same  Patients states they are never, rarely angry  Patient states they are sometimes unusually tired/fatigued  Pain experienced in the last 7 days has been a lot  Patient's pain rating has been 9/10  Patient states that he has experienced no weight loss or gain in last 6 months  Pain in arms and legs    Depression Screening:   PHQ-2 Score: 2  PHQ-9 Score: 7      Fall Risk Screening: In the past year, patient has experienced: no history of falling in past year      Home Safety:  Patient has trouble with stairs inside or outside of their home   Patient has working smoke alarms and has working carbon monoxide detector  Home safety hazards include: none  Nutrition:   Current diet is Regular  Medications:   Patient is currently taking over-the-counter supplements  OTC medications include: see medication list  Patient is able to manage medications  Activities of Daily Living (ADLs)/Instrumental Activities of Daily Living (IADLs):   Walk and transfer into and out of bed and chair?: Yes  Dress and groom yourself?: Yes    Bathe or shower yourself?: Yes    Feed yourself? Yes  Do your laundry/housekeeping?: Yes  Manage your money, pay your bills and track your expenses?: Yes  Make your own meals?: Yes    Do your own shopping?: Yes    Previous Hospitalizations:   Any hospitalizations or ED visits within the last 12 months?: Yes    How many hospitalizations have you had in the last year?: 1-2    Hospitalization Comments: June last year with 2 surgeries    Advance Care Planning:   Living will: No    Durable POA for healthcare: No    Advanced directive: No    Advanced directive counseling given: Yes    Five wishes given: Yes      Cognitive Screening:   Provider or family/friend/caregiver concerned regarding cognition?: No    PREVENTIVE SCREENINGS      Cardiovascular Screening:    General: Screening Current      Diabetes Screening:     General: Screening Current      Colorectal Cancer Screening:     General: Screening Current      Prostate Cancer Screening:      Due for: PSA      Osteoporosis Screening:    General: Screening Not Indicated      Abdominal Aortic Aneurysm (AAA) Screening:        General: Screening Not Indicated      Lung Cancer Screening:     General: Screening Not Indicated      Hepatitis C Screening:    General: Screening Not Indicated    Screening, Brief Intervention, and Referral to Treatment (SBIRT)    Screening  Typical number of drinks in a day: 0  Typical number of drinks in a week: 0  Interpretation: Low risk drinking behavior      Single Item Drug Screening:  How often have you used an illegal drug (including marijuana) or a prescription medication for non-medical reasons in the past year? never    Single Item Drug Screen Score: 0  Interpretation: Negative screen for possible drug use disorder    Brief Intervention  Alcohol & drug use screenings were reviewed  No concerns regarding substance use disorder identified         Edgardo Mcmanus MD

## 2021-05-13 NOTE — PATIENT INSTRUCTIONS

## 2021-05-13 NOTE — PSYCH
MEDICATION MANAGEMENT NOTE        formerly [facility]      Name and Date of Birth:  Mario Linn 49 y o  [birth year] MRN: [id number]    Date of Visit: May 21, 2021    Reason for Visit:   Chief Complaint   Patient presents with    Medication Management    Follow-up       SUBJECTIVE:    Moe Aguilar is seen today for a follow up for Major Depressive Disorder, Generalized Anxiety Disorder and insomnia  He has improved since the last visit  He feels less depressed, is more optimistic  He reports that anxiety symptoms are also more controlled  He went to [place] in April to help his son move and since then noticed improvement in his symptoms  He is less somatic today "I feel that my digestive system is working again"  He is glad that he has been able now to exercise every day and that he lost some weight  He thinks that his psychiatric medications are helping with his mood and also with back pain  Lilliana Burow He denies any suicidal ideation, intent or plan at present; denies any homicidal ideation, intent or plan at present  He has no auditory hallucinations, denies any visual hallucinations, has no delusional thinking  He denies any side effects from current psychiatric medications  HPI ROS Appetite Changes and Sleep:     He reports normal sleep, adequate number of sleep hours (7 hours), normal appetite, recent weight loss (8 lbs) - intentional, normal energy level    Current Rating Scores:     Current PHQ-9   PHQ-9 Score (since 4/20/2021)     PHQ-9 Score  6        Current PHQ-9 score is decreased from 20 at the last visit)      Review Of Systems:      Constitutional recent weight loss (8 lbs)   ENT negative   Cardiovascular negative   Respiratory negative   Gastrointestinal negative   Genitourinary negative   Musculoskeletal leg pain and knee pain   Integumentary negative   Neurological negative   Endocrine negative   Other Symptoms none, all other systems are negative Past Psychiatric History: (unchanged information from previous note copied and updated)    Past Inpatient Psychiatric Treatment:   No history of past inpatient psychiatric admissions  Past Outpatient Psychiatric Treatment:    In outpatient treatment at [place] [address] since 2/2017  Past Suicide Attempts: no  Past Violent Behavior: no  Past Psychiatric Medication Trials: Lexapro, Zoloft, Wellbutrin XL, Trazodone, Abilify, Ambien, Cymbalta, Buspar and Melatonin    Traumatic History: (unchanged information from previous note copied and updated)    Abuse: no history of sexual abuse, no history of physical abuse  Other Traumatic Events: none     Past Medical History:    Past Medical History:   Diagnosis Date    Adrenal adenoma     Arthritis     Carpal tunnel syndrome     Chronic back pain     Chronic pain disorder     CPAP (continuous positive airway pressure) dependence     Cubital tunnel syndrome     Fatigue     Fatty liver     Folliculitis     Hemorrhoid     Liver lesion     Lumbago     Morbid obesity (HCC)     Myofascial pain syndrome     Neuropathy     Scalp lesion     Sleep apnea     CPAP dependance    Sleep difficulties     Thickening of wall of gallbladder     Tinea capitis     Ulnar 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     Umbilical hernia     Vitamin D deficiency      Past Medical History Pertinent Negatives:   Diagnosis Date Noted    Head injury     Seizures ([address] )      Past Surgical History:   Procedure Laterality Date    CARPAL TUNNEL RELEASE Bilateral     CHOLECYSTECTOMY      COLONOSCOPY      CYST REMOVAL      Scalp    EGD      EYE MUSCLE SURGERY Bilateral     as a child for amblyopia    HEMORROIDECTOMY      HERNIA REPAIR      KNEE ARTHROSCOPY Right     PATELLA SURGERY Right     scraped out scar tissue per pt     VT COLONOSCOPY FLX DX W/COLLJ SPEC WHEN PFRMD N/A 2/10/2016    Procedure: COLONOSCOPY;  Surgeon: Ryne Henry MD;  [facility];   Service: Colorectal    WA EGD TRANSORAL BIOPSY SINGLE/MULTIPLE N/A 8/23/2017    Procedure: ESOPHAGOGASTRODUODENOSCOPY (EGD) with bx;  Surgeon: Tracie London MD;  [facility];   Service: Bariatrics    WA EXC SKIN BENIG 0 6-1 CM REMAINDR BODY N/A 12/8/2016    Procedure: EXCISION OF SCALP LESION;  Surgeon: Mamta Horton MD;  [facility];  Service: General    WA HEMORRHOIDECTOMY,INT/EXT, 2+ COLUMNS/GROUPS N/A 2/12/2016    Procedure: HEMORRHOIDECTOMY EXCISION, EUA, Anal fistulotomy;  Surgeon: Devika Sandy MD;  [facility];  Service: Colorectal    WA LAP,CHOLECYSTECTOMY N/A 3/11/2021    Procedure: CHOLECYSTECTOMY LAPAROSCOPIC;  Surgeon: Cecy Cortes MD;  [facility];  Service: General    WA REPAIR UMBILICAL XQOJ,1+I/O,BOOSD N/A 3/11/2021    Procedure: REPAIR HERNIA UMBILICAL RECURRENT;  Surgeon: Cecy Cortes MD;  [facility];  Service: General    UMBILICAL HERNIA REPAIR       Allergies   Allergen Reactions    Penicillins Rash       Substance Abuse History:    Social History     Substance and Sexual Activity   Alcohol Use Yes    Frequency: Monthly or less    Drinks per session: 1 or 2    Binge frequency: Never    Comment: Social alcohol use     Social History     Substance and Sexual Activity   Drug Use Never       Social History:    Social History     Socioeconomic History    Marital status: Legally      Spouse name: Not on file    Number of children: 1    Years of education: some college    Highest education level: Some college, no degree   Occupational History    Occupation: on disability   Social Needs    Financial resource strain: Not hard at all   Carmen-Sherrie insecurity     Worry: Never true     Inability: Never true    Transportation needs     Medical: No     Non-medical: No   Tobacco Use    Smoking status: Never Smoker    Smokeless tobacco: Never Used   Substance and Sexual Activity    Alcohol use: Yes     Frequency: Monthly or less     Drinks per session: 1 or 2     Binge frequency: Never     Comment: Social alcohol use    Drug use: Never    Sexual activity: Yes     Partners: Female   Lifestyle    Physical activity     Days per week: 7 days     Minutes per session: 30 min    Stress: To some extent   Relationships    Social connections     Talks on phone: Never     Gets together: Never     Attends Hoahaoism service: Never     Active member of club or organization: No     Attends meetings of clubs or organizations: Never     Relationship status:     Intimate partner violence     Fear of current or ex partner: No     Emotionally abused: No     Physically abused: No     Forced sexual activity: No   Other Topics Concern    Not on file   Social History Narrative    Education: some college    Learning Disabilities: none    Marital History:     Children: 1 adult son    Living Arrangement: lives in home with parents    Occupational History: on disability, 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    Functioning Relationships: good support system    Legal History: none     History: None        Most recent tobacco use screenin2018       Family Psychiatric History:     Family History   Problem Relation Age of Onset    Cancer Mother     Diabetes type II Father     Depression Sister     Suicide Attempts Sister     No Known Problems Sister     Substance Abuse Neg Hx        History Review:  The following portions of the patient's history were reviewed and updated as appropriate: allergies, current medications, past family history, past medical history, past social history, past surgical history and problem list          OBJECTIVE:     Vital signs in last 24 hours:    Vitals:    21 1507   Weight: 132 kg (291 lb)   Height: 5' 10" (1 778 m)       Mental Status Evaluation:    Appearance age appropriate, casually dressed   Behavior cooperative, calm   Speech normal rate, normal volume, normal pitch   Mood less anxious, less depressed   Affect normal range and intensity, appropriate   Thought Processes organized, goal directed   Associations intact associations   Thought Content no overt delusions   Perceptual Disturbances: no auditory hallucinations, no visual hallucinations   Abnormal Thoughts  Risk Potential Suicidal ideation - None  Homicidal ideation - None  Potential for aggression - No   Orientation oriented to person, place, time/date and situation   Memory recent and remote memory grossly intact   Consciousness alert and awake   Attention Span Concentration Span attention span and concentration are age appropriate   Intellect appears to be of average intelligence   Insight intact   Judgement intact   Muscle Strength and  Gait normal muscle strength and normal muscle tone, normal gait and normal balance   Motor activity no abnormal movements   Language no difficulty naming common objects, no difficulty repeating a phrase, no difficulty writing a sentence   Fund of Knowledge adequate knowledge of current events  adequate fund of knowledge regarding past history  adequate fund of knowledge regarding vocabulary    Pain mild   Pain Scale 3       Laboratory Results: I have personally reviewed all pertinent laboratory/tests results    Recent Labs (last 4 months):    Admission on 03/11/2021, Discharged on 03/11/2021   Component Date Value    Case Report 03/11/2021                      Value:Surgical Pathology Report                         Case: [id number]                                   Authorizing Provider:  Chanel Herrera MD          Collected:           03/11/2021 0753              Ordering Location:     [facility]   Received:            03/11/2021 1630                                     Operating Room                                                               Pathologist:           Chi Camarena MD                                                                  Specimen: Gallbladder, gallbladder                                                                   Final Diagnosis 03/11/2021                      Value: This result contains rich text formatting which cannot be displayed here   Additional Information 03/11/2021                      Value: This result contains rich text formatting which cannot be displayed here  River Perez Gross Description 03/11/2021                      Value: This result contains rich text formatting which cannot be displayed here     Appointment on 02/26/2021   Component Date Value    WBC 02/26/2021 7 62     RBC 02/26/2021 5 02     Hemoglobin 02/26/2021 14 7     Hematocrit 02/26/2021 44 2     MCV 02/26/2021 88     MCH 02/26/2021 29 3     MCHC 02/26/2021 33 3     RDW 02/26/2021 12 8     MPV 02/26/2021 9 4     Platelets 63/93/6410 259     nRBC 02/26/2021 0     Neutrophils Relative 02/26/2021 57     Immat GRANS % 02/26/2021 0     Lymphocytes Relative 02/26/2021 30     Monocytes Relative 02/26/2021 10     Eosinophils Relative 02/26/2021 2     Basophils Relative 02/26/2021 1     Neutrophils Absolute 02/26/2021 4 36     Immature Grans Absolute 02/26/2021 0 03     Lymphocytes Absolute 02/26/2021 2 31     Monocytes Absolute 02/26/2021 0 74     Eosinophils Absolute 02/26/2021 0 13     Basophils Absolute 02/26/2021 0 05     Sodium 02/26/2021 138     Potassium 02/26/2021 4 2     Chloride 02/26/2021 104     CO2 02/26/2021 26     ANION GAP 02/26/2021 8     BUN 02/26/2021 20     Creatinine 02/26/2021 1 20     Glucose, Fasting 02/26/2021 121*    Calcium 02/26/2021 8 3     AST 02/26/2021 20     ALT 02/26/2021 32     Alkaline Phosphatase 02/26/2021 68     Total Protein 02/26/2021 7 0     Albumin 02/26/2021 3 5     Total Bilirubin 02/26/2021 1 10*    eGFR 02/26/2021 68    Office Visit on 02/26/2021   Component Date Value    Ventricular Rate 02/26/2021 80     Atrial Rate 02/26/2021 80     NJ Interval 02/26/2021 170     QRSD Interval 02/26/2021 86     QT Interval 02/26/2021 390     QTC Interval 02/26/2021 450     P Axis 02/26/2021 70     QRS Axis 02/26/2021 32     T Wave Axis 02/26/2021 27        Suicide/Homicide Risk Assessment:    Risk of Harm to Self:  Demographic risk factors include: , age: over 48 or older  Historical Risk Factors include: history of depression, history of anxiety  Recent Specific Risk Factors include: diagnosis of depression  Protective Factors: no current suicidal ideation, compliant with medications, compliant with mental health treatment, stable living environment, supportive parents  Weapons: gun  The following steps have been taken to ensure weapons are properly secured: locked  Based on today's assessment, Daysi Mendez presents the following risk of harm to self: low    Risk of Harm to Others:   The following ratings are based on assessment at the time of the interview  Based on today's assessment, Parminder Andrea presents the following risk of harm to others: none    The following interventions are recommended: no intervention changes needed    Assessment/Plan:       Diagnoses and all orders for this visit:    Major depressive disorder, recurrent episode, in partial remission (HCC)    ALYSHA (generalized anxiety disorder)    Other insomnia          Treatment Recommendations/Precautions:    Continue Effexor  mg daily to improve depressive symptoms  Continue Neurontin 300 mg three times a day to improve anxiety symptoms and neuropathic pain  Continue Ambien 10 mg at bedtime as needed to help with insomnia  Medication management every 3 months  Follows with family physician for yearly physical exam, chronic pain and gastrointestinal issues  Aware of 24 hour and weekend coverage for urgent situations accessed by calling [facility] main practice number    Medications Risks/Benefits      Risks, Benefits And Possible Side Effects Of Medications:    Risks, benefits, and possible side effects of medications explained to Dilma including risk of suicidality and serotonin syndrome related to treatment with antidepressants and risk of impaired next-day mental alertness, complex sleep-related behavior and dependence related to treatment with hypnotic medications  He verbalizes understanding and agreement for treatment  Controlled Medication Discussion:     Dilma has been filling controlled prescriptions on time as prescribed according to [address] Monitoring Program    Psychotherapy Provided:     Individual psychotherapy provided: Yes  Counseling was provided during the session today for 16 minutes  Medications, treatment progress and treatment plan reviewed with Dilma  Goals discussed during in session: maintain control of anxiety, maintain improvement in depression and help with sleep  Discussed with Dilma coping with medical problems and occasional anxiety  Coping strategies including exercising, maintain heathy sleeping hygiene and maintain positive attitude reviewed with Dilma  Supportive therapy provided  Treatment Plan:    Completed and signed during the session: Not applicable - Treatment Plan not due at this session    Note Share: This note was shared with patient      Meghan Norton MD 05/21/21

## 2021-05-13 NOTE — ASSESSMENT & PLAN NOTE
Wellness exam done  Had COVID vaccines  Recommend Tdap, shingrix, and yearly flu shot in the fall  Will check FBS, lipids, and PSA  Had colonoscopy in Nov 2019  No recent falls  Pt is mildly depressed  5 Wishes Handout given

## 2021-05-21 ENCOUNTER — OFFICE VISIT (OUTPATIENT)
Dept: PSYCHIATRY | Facility: CLINIC | Age: 56
End: 2021-05-21
Payer: MEDICARE

## 2021-05-21 VITALS — WEIGHT: 291 LBS | HEIGHT: 70 IN | BODY MASS INDEX: 41.66 KG/M2

## 2021-05-21 DIAGNOSIS — F33.41 MAJOR DEPRESSIVE DISORDER, RECURRENT EPISODE, IN PARTIAL REMISSION (HCC): Primary | Chronic | ICD-10-CM

## 2021-05-21 DIAGNOSIS — G47.09 OTHER INSOMNIA: Chronic | ICD-10-CM

## 2021-05-21 DIAGNOSIS — F41.1 GAD (GENERALIZED ANXIETY DISORDER): Chronic | ICD-10-CM

## 2021-05-21 PROCEDURE — 99214 OFFICE O/P EST MOD 30 MIN: CPT | Performed by: PSYCHIATRY & NEUROLOGY

## 2021-05-21 PROCEDURE — 90833 PSYTX W PT W E/M 30 MIN: CPT | Performed by: PSYCHIATRY & NEUROLOGY

## 2021-05-27 ENCOUNTER — APPOINTMENT (OUTPATIENT)
Dept: LAB | Facility: CLINIC | Age: 56
End: 2021-05-27
Payer: MEDICARE

## 2021-05-27 ENCOUNTER — TRANSCRIBE ORDERS (OUTPATIENT)
Dept: LAB | Facility: CLINIC | Age: 56
End: 2021-05-27

## 2021-05-27 DIAGNOSIS — R73.03 PREDIABETES: ICD-10-CM

## 2021-05-27 DIAGNOSIS — Z13.6 SCREENING FOR CARDIOVASCULAR CONDITION: ICD-10-CM

## 2021-05-27 DIAGNOSIS — Z12.5 PROSTATE CANCER SCREENING: ICD-10-CM

## 2021-05-27 DIAGNOSIS — E55.9 VITAMIN D DEFICIENCY: ICD-10-CM

## 2021-05-27 DIAGNOSIS — D35.01 ADRENAL ADENOMA, RIGHT: ICD-10-CM

## 2021-05-27 LAB
25(OH)D3 SERPL-MCNC: 32.8 NG/ML (ref 30–100)
CHOLEST SERPL-MCNC: 144 MG/DL (ref 50–200)
EST. AVERAGE GLUCOSE BLD GHB EST-MCNC: 114 MG/DL
GLUCOSE P FAST SERPL-MCNC: 109 MG/DL (ref 65–99)
HBA1C MFR BLD: 5.6 %
HDLC SERPL-MCNC: 34 MG/DL
LDLC SERPL CALC-MCNC: 92 MG/DL (ref 0–100)
NONHDLC SERPL-MCNC: 110 MG/DL
PSA SERPL-MCNC: 2.1 NG/ML (ref 0–4)
TRIGL SERPL-MCNC: 89 MG/DL

## 2021-05-27 PROCEDURE — 83036 HEMOGLOBIN GLYCOSYLATED A1C: CPT

## 2021-05-27 PROCEDURE — 80061 LIPID PANEL: CPT

## 2021-05-27 PROCEDURE — 82306 VITAMIN D 25 HYDROXY: CPT

## 2021-05-27 PROCEDURE — 82947 ASSAY GLUCOSE BLOOD QUANT: CPT

## 2021-05-27 PROCEDURE — G0103 PSA SCREENING: HCPCS

## 2021-05-27 PROCEDURE — 36415 COLL VENOUS BLD VENIPUNCTURE: CPT

## 2021-06-01 ENCOUNTER — TELEPHONE (OUTPATIENT)
Dept: NEUROLOGY | Facility: CLINIC | Age: 56
End: 2021-06-01

## 2021-06-01 NOTE — TELEPHONE ENCOUNTER
Called and left a voicemail for patient - Please call back to confirm upcoming appointment with Dr Lisandro Damico  Provided patient with apt date, time and location  Informed patient that check in is at least 15 minutes prior to apt time

## 2021-06-07 ENCOUNTER — OFFICE VISIT (OUTPATIENT)
Dept: URGENT CARE | Age: 56
End: 2021-06-07
Payer: MEDICARE

## 2021-06-07 ENCOUNTER — APPOINTMENT (OUTPATIENT)
Dept: RADIOLOGY | Age: 56
End: 2021-06-07
Payer: MEDICARE

## 2021-06-07 VITALS
OXYGEN SATURATION: 98 % | HEART RATE: 91 BPM | RESPIRATION RATE: 18 BRPM | SYSTOLIC BLOOD PRESSURE: 147 MMHG | TEMPERATURE: 98 F | DIASTOLIC BLOOD PRESSURE: 80 MMHG

## 2021-06-07 DIAGNOSIS — M25.561 ACUTE PAIN OF RIGHT KNEE: ICD-10-CM

## 2021-06-07 DIAGNOSIS — M25.561 ACUTE PAIN OF RIGHT KNEE: Primary | ICD-10-CM

## 2021-06-07 PROCEDURE — 73564 X-RAY EXAM KNEE 4 OR MORE: CPT

## 2021-06-07 PROCEDURE — G0463 HOSPITAL OUTPT CLINIC VISIT: HCPCS | Performed by: NURSE PRACTITIONER

## 2021-06-07 PROCEDURE — 99213 OFFICE O/P EST LOW 20 MIN: CPT | Performed by: NURSE PRACTITIONER

## 2021-06-07 NOTE — PATIENT INSTRUCTIONS
Knee Pain   WHAT YOU NEED TO KNOW:   Knee pain may start suddenly, or it may be a long-term problem  You may have pain on the side, front, or back of your knee  You may have knee stiffness and swelling  You may hear popping sounds or feel like your knee is giving way or locking up as you walk  You may feel pain when you sit, stand, walk, or climb up and down stairs  Knee pain can be caused by conditions such as obesity, inflammation, or strains or tears in ligaments or tendons  DISCHARGE INSTRUCTIONS:   Return to the emergency department if:   · Your pain is worse, even after treatment  · You cannot bend or straighten your leg completely  · The swelling around your knee does not go down even with treatment  · Your knee is painful and hot to the touch  Contact your healthcare provider if:   · You have questions or concerns about your condition or care  Medicines: You may need any of the following:  · NSAIDs  help decrease swelling and pain or fever  This medicine is available with or without a doctor's order  NSAIDs can cause stomach bleeding or kidney problems in certain people  If you take blood thinner medicine, always ask your healthcare provider if NSAIDs are safe for you  Always read the medicine label and follow directions  · Acetaminophen  decreases pain and fever  It is available without a doctor's order  Ask how much to take and how often to take it  Follow directions  Read the labels of all other medicines you are using to see if they also contain acetaminophen, or ask your doctor or pharmacist  Acetaminophen can cause liver damage if not taken correctly  Do not use more than 4 grams (4,000 milligrams) total of acetaminophen in one day  · Prescription pain medicine  may be given  Ask your healthcare provider how to take this medicine safely  Some prescription pain medicines contain acetaminophen   Do not take other medicines that contain acetaminophen without talking to your healthcare provider  Too much acetaminophen may cause liver damage  Prescription pain medicine may cause constipation  Ask your healthcare provider how to prevent or treat constipation  · Take your medicine as directed  Contact your healthcare provider if you think your medicine is not helping or if you have side effects  Tell him or her if you are allergic to any medicine  Keep a list of the medicines, vitamins, and herbs you take  Include the amounts, and when and why you take them  Bring the list or the pill bottles to follow-up visits  Carry your medicine list with you in case of an emergency  What you can do to manage your symptoms:   · Rest your knee so it can heal   Limit activities that increase your pain  Do low-impact exercises, such as walking or swimming  · Apply ice to help reduce swelling and pain  Use an ice pack, or put crushed ice in a plastic bag  Cover it with a towel before you apply it to your knee  Apply ice for 15 to 20 minutes every hour, or as directed  · Apply compression to help reduce swelling  Use a brace or bandage only as directed  · Elevate your knee to help decrease pain and swelling  Elevate your knee while you are sitting or lying down  Prop your leg on pillows to keep your knee above the level of your heart  · Prevent your knee from moving as directed  Your healthcare provider may put on a cast or splint  You may need to wear a leg brace to stabilize your knee  A leg brace can be adjusted to increase your range of motion as your knee heals  What you can do to prevent knee pain:   · Maintain a healthy weight  Extra weight increases your risk for knee pain  Ask your healthcare provider how much you should weigh  He or she can help you create a safe weight loss plan if you need to lose weight  · Exercise or train properly  Use the correct equipment for sports  Wear shoes that provide good support   Check your posture often as you exercise, play sports, or train for an event  This can help prevent stress and strain on your knees  Rest between sessions so you do not overwork your knees  Follow up with your healthcare provider within 24 hours or as directed: You may need follow-up treatments, such as steroid injections to decrease pain  Write down your questions so you remember to ask them during your visits  © Copyright [address] Information is for End User's use only and may not be sold, redistributed or otherwise used for commercial purposes  All illustrations and images included in CareNotes® are the copyrighted property of [employer]  or [employer]  The above information is an  only  It is not intended as medical advice for individual conditions or treatments  Talk to your doctor, nurse or pharmacist before following any medical regimen to see if it is safe and effective for you

## 2021-06-07 NOTE — PROGRESS NOTES
[facility]        NAME: Bev Joseph is a 49 y o  male  : 1965    MRN: [id number]  DATE: 2021  TIME: 9:03 AM    Assessment and Plan   Acute pain of right knee [M25 561]  1  Acute pain of right knee  XR knee 4+ vw right injury         Patient Instructions     Refused crutches  Says he has a cane at home to use  Advised to continue using NSAIDs OTC  Follow up with PCP; consider further scans and/or referral to ortho if appropriate  Proceed to  ER if symptoms worsen  Chief Complaint     Chief Complaint   Patient presents with    Knee Pain     right         History of Present Illness       HPI   reports pain in the right knee x 2 weeks  Has been severe x 1 week  Denies trauma  States he has been going to the gym consistently for a couple of weeks now  Before now, has not been consistent with exercising x 1 year  Previous Hx of surgery involving tendinitis and repair several years ago  Previous Hx of R knee sprain  Rates current pain at 7/10, feels tight and painful  No radiation of the pain  Worse with weight bearing  Review of Systems   Review of Systems   Constitutional: Negative for chills and fever  Musculoskeletal: Positive for arthralgias (right knee), gait problem and joint swelling  Skin: Negative for rash and wound  Neurological: Negative for numbness and headaches           Current Medications       Current Outpatient Medications:     divalproex sodium (DEPAKOTE) 500 mg EC tablet, 500 mg nightly to try and stop headache for 3-5 nights (Patient not taking: Reported on 2021), Disp: 5 tablet, Rfl: 3    gabapentin (NEURONTIN) 300 mg capsule, Take 1 capsule (300 mg total) by mouth 3 (three) times a day, Disp: 90 capsule, Rfl: 4    naproxen sodium (Aleve) 220 MG tablet, Take 220 mg by mouth 2 (two) times a day with meals, Disp: , Rfl:     venlafaxine (EFFEXOR-XR) 150 mg 24 hr capsule, Take 1 capsule (150 mg total) by mouth daily, Disp: 30 capsule, Rfl: 4   zolpidem (AMBIEN) 10 mg tablet, Take 1 tablet (10 mg total) by mouth daily at bedtime as needed for sleep To be filled on or after 4/14/21, Disp: 30 tablet, Rfl: 3    Current Allergies     Allergies as of 06/07/2021 - Reviewed 06/07/2021   Allergen Reaction Noted    Penicillins Rash 02/05/2016            The following portions of the patient's history were reviewed and updated as appropriate: allergies, current medications, past family history, past medical history, past social history, past surgical history and problem list      Past Medical History:   Diagnosis Date    Adrenal adenoma     Arthritis     Carpal tunnel syndrome     Chronic back pain     Chronic pain disorder     CPAP (continuous positive airway pressure) dependence     Cubital tunnel syndrome     Fatigue     Fatty liver     Folliculitis     Hemorrhoid     Liver lesion     Lumbago     Morbid obesity ([address] )     Myofascial pain syndrome     Neuropathy     Scalp lesion     Sleep apnea     CPAP dependance    Sleep difficulties     Thickening of wall of gallbladder     Tinea capitis     Ulnar 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     Umbilical hernia     Vitamin D deficiency        Past Surgical History:   Procedure Laterality Date    CARPAL TUNNEL RELEASE Bilateral     CHOLECYSTECTOMY      COLONOSCOPY      CYST REMOVAL      Scalp    EGD      EYE MUSCLE SURGERY Bilateral     as a child for amblyopia    HEMORROIDECTOMY      HERNIA REPAIR      KNEE ARTHROSCOPY Right     PATELLA SURGERY Right     scraped out scar tissue per pt     OR COLONOSCOPY FLX DX W/COLLJ SPEC WHEN PFRMD N/A 2/10/2016    Procedure: COLONOSCOPY;  Surgeon: Ezio Reyes MD;  [facility]; Service: Colorectal    OR EGD TRANSORAL BIOPSY SINGLE/MULTIPLE N/A 8/23/2017    Procedure: ESOPHAGOGASTRODUODENOSCOPY (EGD) with bx;  Surgeon: Vishal Henry MD;  [facility];   Service: Bariatrics    OR EXC SKIN BENIG 0 6-1 CM REMAINDR BODY N/A 12/8/2016 Procedure: EXCISION OF SCALP LESION;  Surgeon: Fidelia Mcfadden MD;  [facility];  Service: General    DC HEMORRHOIDECTOMY,INT/EXT, 2+ COLUMNS/GROUPS N/A 2/12/2016    Procedure: HEMORRHOIDECTOMY EXCISION, EUA, Anal fistulotomy;  Surgeon: Ryne Henry MD;  [facility];  Service: Colorectal    DC LAP,CHOLECYSTECTOMY N/A 3/11/2021    Procedure: Valorie Copier;  Surgeon: Sharon Masterson MD;  [facility];  Service: General    DC REPAIR UMBILICAL SMKD,0+H/F,URYSU N/A 3/11/2021    Procedure: REPAIR HERNIA UMBILICAL RECURRENT;  Surgeon: Sharon Masterson MD;  [facility];  Service: General    UMBILICAL HERNIA REPAIR         Family History   Problem Relation Age of Onset    Cancer Mother     Diabetes type II Father     Depression Sister     Suicide Attempts Sister     No Known Problems Sister     Substance Abuse Neg Hx          Medications have been verified  Objective   /80   Pulse 91   Temp 98 °F (36 7 °C)   Resp 18   SpO2 98%   No LMP for male patient  Physical Exam     Physical Exam  Musculoskeletal:         General: Swelling (just above the right knee, anterior aspect) and tenderness (greatest pain with palpation of the medial aspect, less pain on the lateral aspect  Moderate pain behind the R knee, in the area of the hamstring  Limited ROM secondary to pain  Lipms, favoring the R knee) present  Skin:     Findings: No bruising or erythema  Neurological:      Gait: Gait abnormal (but with no knee instability)

## 2021-06-08 ENCOUNTER — TELEPHONE (OUTPATIENT)
Dept: FAMILY MEDICINE CLINIC | Facility: CLINIC | Age: 56
End: 2021-06-08

## 2021-06-08 DIAGNOSIS — M17.11 ARTHRITIS OF RIGHT KNEE: Primary | ICD-10-CM

## 2021-06-09 ENCOUNTER — TELEPHONE (OUTPATIENT)
Dept: NEUROLOGY | Facility: CLINIC | Age: 56
End: 2021-06-09

## 2021-06-09 NOTE — TELEPHONE ENCOUNTER
LVM for patient to call office to reschedule appt with Dr Jasmyn Chen that was cancelled via my chart  6/17/21 patient not rescheduling at this time   Patient does have appt with Isela Almaraz in Sept    Patient states he has not had a headache since seeing Dr Dixon Lugo and does have the prescription but has not used yet

## 2021-06-15 ENCOUNTER — OFFICE VISIT (OUTPATIENT)
Dept: OBGYN CLINIC | Facility: CLINIC | Age: 56
End: 2021-06-15
Payer: MEDICARE

## 2021-06-15 VITALS
WEIGHT: 288.6 LBS | DIASTOLIC BLOOD PRESSURE: 78 MMHG | HEART RATE: 94 BPM | HEIGHT: 70 IN | BODY MASS INDEX: 41.32 KG/M2 | SYSTOLIC BLOOD PRESSURE: 133 MMHG

## 2021-06-15 DIAGNOSIS — M17.11 PRIMARY OSTEOARTHRITIS OF RIGHT KNEE: Primary | ICD-10-CM

## 2021-06-15 PROCEDURE — 99204 OFFICE O/P NEW MOD 45 MIN: CPT | Performed by: ORTHOPAEDIC SURGERY

## 2021-06-15 RX ORDER — IBUPROFEN 600 MG/1
600 TABLET ORAL EVERY 6 HOURS PRN
COMMUNITY
End: 2022-05-12 | Stop reason: ALTCHOICE

## 2021-06-15 NOTE — PROGRESS NOTES
Assessment/Plan:  1  Primary osteoarthritis of right knee  Ambulatory referral to Orthopedic Surgery     Austin Guillen is a pleasant 65-year-old gentleman who presents to our clinic as a referral from Anjelica Arteaga MD  due to acute right knee pain  After thorough review of his imaging, as well as a comprehensive history and physical exam, I believe Austin Guillen is suffering from moderate to severe tricompartmental osteoarthritis of his right knee  We discussed various treatment options including physical therapy, NSAID and Tylenol use, corticosteroid injections, and knee replacement  Patient would like to utilize a more conservative route of management, which I agree with  A physician directed home exercise therapy was provided  We also discussed alternating between Motrin and Tylenol for pain control in the short interval period we encourage patient to resume elliptical use  We discouraged any leg extension exercises as this may exacerbate his pain  He may do leg press as an alternative for leg strengthening exercise  He may CS back in the office as needed for his right knee pain  All his questions were answered  Subjective:   Initial evaluation of right knee pain    Patient ID: Vivi Guillaume is a 49 y o  male  HPI  Austin Guillen is a 49year old gentleman who presents to our clinic due to right knee pain  He states pain started about 1 5 months ago when he started working out  He notes he has been using the elliptical and doing leg extensions at the gym but states his right knee has had severe pain since then  He denies any trauma to the right knee  He notes the pain on the inside aspect of his right knee which then travels up the back of his thigh  He has now reduced his activities as a result  He denies any mechanical symptoms  He denies any recent falls  He has also noted swelling surrounding his knee  He is also noted significant pain while trying to sleep over the past year        Review of Systems Constitutional: Positive for activity change  Negative for chills, diaphoresis, fatigue and fever  HENT: Negative  Eyes: Negative  Respiratory: Negative for cough, chest tightness, shortness of breath and wheezing  Cardiovascular: Negative for chest pain, palpitations and leg swelling  Gastrointestinal: Negative for abdominal distention, abdominal pain, blood in stool, constipation, diarrhea, nausea and vomiting  Endocrine: Negative  Genitourinary: Negative  Negative for difficulty urinating, dysuria, frequency, hematuria and urgency  Musculoskeletal: Positive for arthralgias and joint swelling  Negative for back pain, neck pain and neck stiffness  See HPI   Skin: Negative for pallor and rash  Neurological: Negative for dizziness, facial asymmetry, weakness, light-headedness, numbness and headaches  Hematological: Negative  Psychiatric/Behavioral: Negative  All other systems reviewed and are negative          Past Medical History:   Diagnosis Date    Adrenal adenoma     Arthritis     Carpal tunnel syndrome     Chronic back pain     Chronic pain disorder     CPAP (continuous positive airway pressure) dependence     Cubital tunnel syndrome     Fatigue     Fatty liver     Folliculitis     Hemorrhoid     Liver lesion     Lumbago     Morbid obesity (HCC)     Myofascial pain syndrome     Neuropathy     Scalp lesion     Sleep apnea     CPAP dependance    Sleep difficulties     Thickening of wall of gallbladder     Tinea capitis     Ulnar 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     Umbilical hernia     Vitamin D deficiency        Past Surgical History:   Procedure Laterality Date    CARPAL TUNNEL RELEASE Bilateral     CHOLECYSTECTOMY      COLONOSCOPY      CYST REMOVAL      Scalp    EGD      EYE MUSCLE SURGERY Bilateral     as a child for amblyopia    HEMORROIDECTOMY      HERNIA REPAIR      KNEE ARTHROSCOPY Right     PATELLA SURGERY Right     scraped out scar tissue per pt  CO COLONOSCOPY FLX DX W/COLLJ SPEC WHEN PFRMD N/A 2/10/2016    Procedure: COLONOSCOPY;  Surgeon: Shari Landers MD;  [facility]; Service: Colorectal    CO EGD TRANSORAL BIOPSY SINGLE/MULTIPLE N/A 8/23/2017    Procedure: ESOPHAGOGASTRODUODENOSCOPY (EGD) with bx;  Surgeon: Ad Costa MD;  [facility];   Service: Bariatrics    CO EXC SKIN BENIG 0 6-1 CM REMAINDR BODY N/A 12/8/2016    Procedure: EXCISION OF SCALP LESION;  Surgeon: Ariadne Reese MD;  [facility];  Service: General    CO HEMORRHOIDECTOMY,INT/EXT, 2+ COLUMNS/GROUPS N/A 2/12/2016    Procedure: HEMORRHOIDECTOMY EXCISION, EUA, Anal fistulotomy;  Surgeon: Shari Landers MD;  [facility];  Service: Colorectal    CO LAP,CHOLECYSTECTOMY N/A 3/11/2021    Procedure: CHOLECYSTECTOMY LAPAROSCOPIC;  Surgeon: Ally Acharya MD;  [facility];  Service: General    CO REPAIR UMBILICAL WHBQ,3+G/E,MQNHJ N/A 3/11/2021    Procedure: REPAIR HERNIA UMBILICAL RECURRENT;  Surgeon: Ally Acharya MD;  [facility];  Service: General    UMBILICAL HERNIA REPAIR         Family History   Problem Relation Age of Onset   Kaycee Courser Cancer Mother     Diabetes type II Father     Depression Sister     Suicide Attempts Sister     No Known Problems Sister     Substance Abuse Neg Hx        Social History     Occupational History    Occupation: on disability   Tobacco Use    Smoking status: Never Smoker    Smokeless tobacco: Never Used   Vaping Use    Vaping Use: Never used   Substance and Sexual Activity    Alcohol use: Yes     Comment: Social alcohol use    Drug use: Never    Sexual activity: Yes     Partners: Female         Current Outpatient Medications:     gabapentin (NEURONTIN) 300 mg capsule, Take 1 capsule (300 mg total) by mouth 3 (three) times a day, Disp: 90 capsule, Rfl: 4    ibuprofen (MOTRIN) 600 mg tablet, Take 600 mg by mouth every 6 (six) hours as needed for mild pain, Disp: , Rfl:    venlafaxine (EFFEXOR-XR) 150 mg 24 hr capsule, Take 1 capsule (150 mg total) by mouth daily, Disp: 30 capsule, Rfl: 4    zolpidem (AMBIEN) 10 mg tablet, Take 1 tablet (10 mg total) by mouth daily at bedtime as needed for sleep To be filled on or after 4/14/21, Disp: 30 tablet, Rfl: 3    divalproex sodium (DEPAKOTE) 500 mg EC tablet, 500 mg nightly to try and stop headache for 3-5 nights (Patient not taking: Reported on 5/21/2021), Disp: 5 tablet, Rfl: 3    naproxen sodium (Aleve) 220 MG tablet, Take 220 mg by mouth 2 (two) times a day with meals, Disp: , Rfl:     Allergies   Allergen Reactions    Penicillins Rash       Objective:  Vitals:    06/15/21 1018   BP: 133/78   Pulse: 94       Body mass index is 41 41 kg/m²  Right Knee Exam     Muscle Strength   The patient has normal right knee strength  Tenderness   The patient is experiencing tenderness in the medial joint line  Range of Motion   The patient has normal right knee ROM  Extension: 0   Flexion: 120     Tests   Bobbi:  Medial - negative Lateral - negative  Drawer:  Anterior - negative    Posterior - negative    Other   Erythema: absent  Sensation: normal  Pulse: present  Swelling: mild            Physical Exam  Vitals and nursing note reviewed  Constitutional:       General: He is not in acute distress  Appearance: He is obese  He is not ill-appearing or diaphoretic  HENT:      Head: Normocephalic and atraumatic  Eyes:      Extraocular Movements: Extraocular movements intact  Conjunctiva/sclera: Conjunctivae normal    Cardiovascular:      Pulses: Normal pulses  Pulmonary:      Effort: Pulmonary effort is normal  No respiratory distress  Musculoskeletal:      Cervical back: Normal range of motion  Right knee:      Instability Tests: Medial Bobbi test negative and lateral Bobbi test negative  Skin:     General: Skin is warm and dry  Capillary Refill: Capillary refill takes less than 2 seconds  Neurological:      General: No focal deficit present  Mental Status: He is alert  Psychiatric:         Mood and Affect: Mood normal          Behavior: Behavior normal          I have personally reviewed pertinent films in PACS      X-ray of right knee nonweightbearing performed on 06/07/2021 shows no acute fracture or dislocation, prominent fabella,   1 7 cm heterotopic ossification noted in the posterior aspect of the fibula,  Evidence of moderate to severe tricompartmental osteoarthritis with joint space narrowing, osteophytosis, varus deformity, no lytic or blastic lesions

## 2021-06-15 NOTE — PATIENT INSTRUCTIONS
Knee Exercises   GENERAL INFORMATION:   What do I need to know about knee exercises? Knee exercises help strengthen the muscles around your knee  Strong muscles can help reduce pain and decrease your risk of future injury  Knee exercises also help you heal after an injury or surgery  · Start slow  These are beginning exercises  Ask your caregiver if you need to see a physical therapist for more advanced exercises  As you get stronger, you may be able to do more sets of each exercise or add weights  · Stop if you feel pain  It is normal to feel some discomfort at first  Regular exercise will help decrease your discomfort over time  · Do the exercises on both legs  Do this so both knees remain strong  · Warm up before you do knee exercises  Walk or ride a stationary bike for 5 or 10 minutes to warm your muscles  How do I perform knee stretches safely? Always stretch before you do strengthening exercises  Do these stretches 4 or 5 days a week, or as directed  · Standing calf stretch:     ? Face a wall and place both palms flat on the wall, or hold the back of a chair for balance  Keep a slight bend in your knees  Take a big step backward with one leg  Keep your other leg directly under you  Keep both heels flat and press your hips forward  Hold the stretch for 30 seconds, and then relax for 30 seconds  Switch legs  Repeat 2 or 3 times on each leg  · Standing quadriceps stretch:     ? Stand and place one hand against a wall or hold the back of a chair for balance  With your weight on one leg, bend your other leg and grab your ankle  Bring your heel toward your buttocks  Hold the stretch for 30 to 60 seconds  Switch legs  Repeat 2 or 3 times on each leg  · Sitting hamstring stretch:     ? Sit with both legs straight in front of you  Do not point or flex your toes  Place your palms on the floor and slide your hands forward until you feel the stretch  Do not round your back   Hold the stretch for 30 seconds  Repeat 2 or 3 times  How do I perform knee strengthening exercises safely? Do these exercises 4 or 5 days a week, or as directed  · Standing half squats:     ? Stand with your feet shoulder-width apart  Lean your back against a wall or hold the back of a chair for balance, if needed  Slowly sit down about 10 inches, as if you are going to sit in a chair  Your body weight should be mostly over your heels  Hold the squat for 5 seconds, then rise to a standing position  Do 2 sets of 10 squats to strengthen your buttocks and thighs  · Standing hamstring curls:     ? Face a wall and place both palms flat on the wall, or hold the back of a chair for balance  With your weight on one leg, lift your other foot as close to your buttocks as you can  Hold for 5 seconds and then lower your leg  Do 2 sets of 10 curls on each leg  This exercise strengthens the muscles in the back of your thigh  · Standing calf raises:     ? Face a wall and place both palms flat on the wall, or hold the back of a chair for balance  Stand up straight, and do not lean  Bend one knee and raise your lower leg off the floor  Lift and lower the heel of your other leg  Do 2 sets of 10 calf raises on each leg to strengthen your calf muscles  · Sitting leg lifts:     ? Sit in a chair  Slowly straighten and raise one leg  Squeeze your thigh muscles and hold for 5 seconds  Relax and return your foot to the floor  Do 2 sets of 10 lifts on each leg  This helps strengthen the muscles in the front of your thigh  · Straight leg lifts:     ? Lie on your stomach with straight legs  Fold your arms in front of you and rest your head in your arms  Tighten your leg muscles and raise one leg as high as you can  Hold for 5 seconds, then lower your leg  Do 2 sets of 10 lifts on each leg to strengthen your buttocks  When should I contact my caregiver? · You have new pain or your pain becomes worse       · You have questions or concerns about your condition, care, or exercise program   CARE AGREEMENT:   You have the right to help plan your care  Learn about your health condition and how it may be treated  Discuss treatment options with your caregivers to decide what care you want to receive  You always have the right to refuse treatment  The above information is an  only  It is not intended as medical advice for individual conditions or treatments  Talk to your doctor, nurse or pharmacist before following any medical regimen to see if it is safe and effective for you  © 2014 [address] is for End User's use only and may not be sold, redistributed or otherwise used for commercial purposes  All illustrations and images included in CareNotes® are the copyrighted property of [employer]  or Cand[employer] Lancaster

## 2021-07-13 ENCOUNTER — TELEPHONE (OUTPATIENT)
Dept: OBGYN CLINIC | Facility: HOSPITAL | Age: 56
End: 2021-07-13

## 2021-07-13 NOTE — TELEPHONE ENCOUNTER
Patient sees Dr Baldwin Learn    Patient is calling to request an order for PT be placed in his chart  He would like to be notified when the order is there      Call back # [phone]

## 2021-07-13 NOTE — TELEPHONE ENCOUNTER
Physical therapy prescription was placed in his chart  Please advise patient so he can set up physical therapy

## 2021-08-04 ENCOUNTER — SOCIAL WORK (OUTPATIENT)
Dept: BEHAVIORAL/MENTAL HEALTH CLINIC | Facility: CLINIC | Age: 56
End: 2021-08-04
Payer: MEDICARE

## 2021-08-04 ENCOUNTER — EVALUATION (OUTPATIENT)
Dept: PHYSICAL THERAPY | Facility: CLINIC | Age: 56
End: 2021-08-04
Payer: MEDICARE

## 2021-08-04 DIAGNOSIS — F41.1 GAD (GENERALIZED ANXIETY DISORDER): Chronic | ICD-10-CM

## 2021-08-04 DIAGNOSIS — F33.41 MAJOR DEPRESSIVE DISORDER, RECURRENT EPISODE, IN PARTIAL REMISSION (HCC): Primary | Chronic | ICD-10-CM

## 2021-08-04 DIAGNOSIS — M17.11 PRIMARY OSTEOARTHRITIS OF RIGHT KNEE: ICD-10-CM

## 2021-08-04 PROCEDURE — 97161 PT EVAL LOW COMPLEX 20 MIN: CPT | Performed by: PHYSICAL THERAPIST

## 2021-08-04 PROCEDURE — 97110 THERAPEUTIC EXERCISES: CPT | Performed by: PHYSICAL THERAPIST

## 2021-08-04 PROCEDURE — 90791 PSYCH DIAGNOSTIC EVALUATION: CPT | Performed by: COUNSELOR

## 2021-08-04 NOTE — PROGRESS NOTES
PT Evaluation     Today's date: 2021  Patient name: Lillian Esparza  :   MRN: [id number]  Referring provider: Marylu Demarco*  Dx:   Encounter Diagnosis     ICD-10-CM    1  Primary osteoarthritis of right knee  M17 11 Ambulatory referral to Physical Therapy                  Assessment  Assessment details: Lillian Esparza is a 49 y o  male with Primary osteoarthritis of right knee  He presents with pain, decreased functional ability, decreased ROM, and , ambulatory dysfunction  Due to these impairments, Patient has difficulty performing a/iadls, recreational activities and engaging in social activities  Patient's clinical presentation is consistent with their referring diagnosis  Patient would benefit from skilled physical therapy to address their aforementioned impairments, improve their level of function and to improve their overall quality of life   has been given a home exercise program and is in agreement with the plan of care  Thank you for your referral   Impairments: abnormal or restricted ROM, lacks appropriate home exercise program and pain with function    Symptom irritability: moderateUnderstanding of Dx/Px/POC: excellent   Prognosis: good    Goals  ST Goals - 2-4 weeks  1  Patient will report decreased pain with activity by at least 2 points within 4 weeks  2  Patient will improve ROM 5-10 degrees within 4 weeks  3  Patient will demonstrate ability to actively correct posture without cueing within 4 weeks  4  Patient will perform IADLs without pain in 2 weeks  5  Patient will increase strength by 25% in 4 weeks    LT Goals - Discharge  1  Patient will improve FOTO score to maximum stated or greater by discharge  2  Patient will return to preferred recreational activity without significant pain increase by discharge   3    Patient will return to all work related activities without pain by discharge     Plan  Patient would benefit from: skilled physical therapy  Planned therapy interventions: manual therapy, muscle pump exercises, neuromuscular re-education, stretching, strengthening, therapeutic exercise, therapeutic activities and home exercise program  Frequency: 2x week  Duration in visits: 20  Duration in weeks: 20  Plan of Care beginning date: 2021  Plan of Care expiration date: 2021  Treatment plan discussed with: patient        Subjective Evaluation    History of Present Illness  Mechanism of injury: Patient reports that he returned to working out at the gym in May and began having R knee pain  He states that he was doing knee exercises particularly the knee extensions which may have aggravated his knee  He has had a history of R knee surgery on his patellar tendon in high school ()  CC:  Pain at the medial joint line  He notes swelling "sometimes"  He notes that pain radiates up into the adductor longus region  He  states that he needs to do a step too pattern for stair climbing  Function:  He notes pain when he gets out of his car  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  He has also gone to the driving range and had pain afterwards  Mowing the grass has also aggravated the knee  Pain can wake him up without medications              Recurrent probem    Quality of life: excellent    Pain  Current pain ratin  At worst pain rating: 3  Quality: pressure and sharp  Relieving factors: ice, heat and medications  Aggravating factors: walking, standing and stair climbing  Progression: no change    Social Support  Steps to enter house: yes  Stairs in house: yes   Lives in: multiple-level home  Lives with: spouse and young children    Employment status: working    Diagnostic Tests  X-ray: abnormal  Treatments  Previous treatment: physical therapy  Patient Goals  Patient goals for therapy: decreased pain, increased motion, return to sport/leisure activities and independence with ADLs/IADLs          Objective     Tenderness     Right Knee   Tenderness in the medial joint line  Active Range of Motion   Left Knee   Flexion: 135 degrees   Extension: 2 degrees   Extensor lag: 3 degrees     Right Knee   Flexion: 120 degrees   Extension: 3 degrees   Extensor la degrees     Additional Active Range of Motion Details  Hip Strength:  R:  wnl      L:  wnl    Mobility   Patellar Mobility:     Right Knee   WFL: medial, lateral, superior and inferior    Patellar Static Positioning   Right Knee: [facility]    Strength/Myotome Testing     Left Knee   Normal strength    Right Knee   Normal strength  Quadriceps contraction: fair    Tests     Right Knee   Negative anterior drawer, anterior Lachman, Apley's compression, Apley's distraction, valgus stress test at 0 degrees and valgus stress test at 30 degrees  Swelling     Left Knee Girth Measurement (cm)   Joint line: 47 cm  10 cm above joint line: 50 5 cm  10 cm below joint line: 46 cm    Right Knee Girth Measurement (cm)   Joint line: 46 5 cm  10 cm above joint line: 51 cm  10 cm below joint line: 47 cm    Functional Assessment      Squat    Trunk lean left       Single Leg Stance - Eyes Open   Left  Trial 1: 10 seconds             Precautions:na      Manuals 8/4            S/l quad st                                                    Neuro Re-Ed                                                                                                        Ther Ex             Bike nv            SLR x10            QS 10 x:05            HS 10 x :10            ADD Squeeze             TKE             clams                          Ther Activity                                       Gait Training                                       Modalities

## 2021-08-04 NOTE — PSYCH
Assessment/Plan:   9:00am - 9:58am   Diagnoses and all orders for this visit:    Major depressive disorder, recurrent episode, in partial remission (HCC)    ALYSHA (generalized anxiety disorder)          Subjective:      Patient ID: Lillian Esparza is a 49 y o  male  HPI:     Pre-morbid level of function and History of Present Illness: Reports symptoms of depression, "What started this out was an incident with my job" in 2012  Worked for [employer] for 10 years  He says he was on workman's comp for carpal tunnel  He was on a work restriction  Reports he was fired in 2012 due to "insubordination" regarding an incident in which he was asked to load food into a contaminated truck and he refused  He went through [employer]  This was drug out for 4 years  Reports three years ago he was in bed for 22 hours per day  Reports thoughts of hopelessness and worthlessness in the past "a lot of that is gone", but they were there six months ago, depressed mood  Reports no current sleep disturbances, sleep apnea  Says forcing himself to stay active has been helpful  Says many medical issues in 2019, including his taste being "fouled up"  Back injury in 2015    Previous Psychiatric/psychological treatment/year: 2021 for psychiatrist, 2018 for therapy at [facility], worked in group therapy through [facility]  Current Psychiatrist/Therapist: Dr Hira Lopez and/or Partial and Other Community Resources Used (CTT, ICM, VNA): n/a      Problem Assessment:     SOCIAL/VOCATION:  Family Constellation (include parents, relationship with each and pertinent Psych/Medical History):     Family History   Problem Relation Age of Onset    Cancer Mother     Diabetes type II Father     Depression Sister     Suicide Attempts Sister     No Known Problems Sister     Substance Abuse Neg Hx        Mother: Lives with his mother who is in her late 76s  Spouse: Legally  from his son's mother when he was 12 years old, Jose Carlos Fat  Father: [de-identified]years old, good relationship with them   Children: See below   Siblin sisters, Sheila Jo and Antoine Carlos, both younger  Younger brother  in motorcycle accident 32years old  Sibling: n/a   Children: Son 32years old, Namita Dickens  2 other boys that look at him like a father, 16 and 15  Other: n/a    Lisa Bustamante relates best to [facility], his current girlfriend, he's been with her for 3 years  he lives with his parents  he does not live alone  Domestic Violence: None    Additional Comments related to family/relationships/peer support: [facility], long time girlfriend  Reuniting with long term friends    School or Work History (strengths/limitations/needs): [employer] some college, worked with [employer], part time work at times, SSDI    Her highest grade level achieved was Some college     history includes None    Financial status includes SSDI, Part time work    LEISURE ASSESSMENT (Include past and present hobbies/interests and level of involvement (Ex: Group/Club Affiliations): Baseball "Haven't played recently", trying to get into another league to try it out again  Going to driving range with his friends recently  his primary language is [place]  Preferred language is [place]  Ethnic considerations are Caucasain  Religions affiliations and level of involvement [facility]   Does spirituality help you cope?  Yes     FUNCTIONAL STATUS: There has been a recent change in Lisa Bustamante ability to do the following: n/a    Level of Assistance Needed/By Whom?: n/a    Lisa Bustamante learns best by  reading and listening    SUBSTANCE ABUSE ASSESSMENT: no substance abuse    Substance/Route/Age/Amount/Frequency/Last Use: n/a    DETOX HISTORY: n/a    Previous detox/rehab treatment: n/a    HEALTH ASSESSMENT: no referral to PCP needed    LEGAL: No Mental Health Advance Directive or Power of  on file    Prenatal History: N/A    Delivery History: N/A    Developmental Milestones: N/A  Temperament as an infant was normal     Temperament as a toddler was normal   Temperament at school age was normal   Temperament as a teenager was normal     Risk Assessment:   The following ratings are based on my interview(s) with Odette Sands    Risk of Harm to Self:   Demographic risk factors include   Historical Risk Factors include Had vivid dreams in the past on a medication in which he was concerned about his acting out his dreams  Recent Specific Risk Factors include diagnosis of depression   Additional Factors for a Child or Adolescent n/a    Risk of Harm to Others:   Demographic Risk Factors include male and unemployed  Historical Risk Factors include n/a  Recent Specific Risk Factors include n/a    Access to Weapons:   Odette Sands has access to the following weapons: Firearms  The following steps have been taken to ensure weapons are properly secured: They are secured with locks on them       Based on the above information, the client presents the following risk of harm to self or others:  low    The following interventions are recommended:   consultation with psychiatrist and therapist    Notes regarding this Risk Assessment: None        Review Of Systems:     Mood Normal   Behavior Normal    Thought Content Normal   General Emotional Problems and Sleep Disturbances   Personality Normal   Other Psych Symptoms Normal   Constitutional Recent Wt Loss ( 10 Lbs)   ENT Normal   Cardiovascular Normal    Respiratory Normal    Gastrointestinal Normal   Genitourinary Normal    Musculoskeletal Negative   Integumentary Normal    Neurological Normal    Endocrine Normal          Mental status:  Appearance calm and cooperative , adequate hygiene and grooming and good eye contact    Mood euthymic   Affect affect appropriate    Speech a normal rate   Thought Processes normal thought processes   Hallucinations no hallucinations present    Thought Content no delusions   Abnormal Thoughts no suicidal thoughts  and no homicidal thoughts    Orientation oriented to person and place and time   Remote Memory short term memory intact and long term memory intact   Attention Span concentration intact   Intellect Appears to be of Average Intelligence   Fund of Knowledge displays adequate knowledge of current events, adequate fund of knowledge regarding past history and adequate fund of knowledge regarding vocabulary    Insight Insight intact   Judgement judgment was intact   Muscle Strength Muscle strength and tone were normal   Language no difficulty naming common objects and no difficulty repeating a phrase    Pain none   Pain Scale 0

## 2021-08-04 NOTE — BH TREATMENT PLAN
Emilia Fay  [birth year]       Date of Initial Treatment Plan: 8/4/2021   Date of Current Treatment Plan: 08/04/21    Treatment Plan Number 1     Strengths/Personal Resources for Self Care: Excellent relationship with his girlfriend, engaging in social activities with his parents, children and friends, continuing his interest in golf, baseball  Diagnosis:   1  Major depressive disorder, recurrent, moderate (HCC)         Area of Needs: Residual symptoms of depression that appear to be moderate, mild lethargy, previous thoughts of hopelessness and worthlessness, lower mood at times      Long Term Goal 1: Maintain or eliminate reduced symptoms of depression, currently 4/10 if 10 is "worst depression"  "I think one of the biggest reasons for healing is to have closure of my issues with Con[employer] foods" "I'd like to heal naturally and eventually go off of my medications"    Target Date: 2/4/2022  Completion Date: N/A         Short Term Objectives for Goal 1: This writer will provide Artemio with PCT, SFBT, CBT and ET interventions through discussion about his history with Jabier Lockwood and attempting to find closure, utilizing empathic listening, reframing, decatastrophizing, looking into any guilt/blame regarding incident, encouraging his strengths, validation of feelings  Artemio will enagage in any homework assigned, come to therapy sessions, discuss incident with Jabier Lockwood openly to process and engage with his psychiatrist  Zulay Torres will provide medication management  GOAL 1: Modality: Individual 4x per month   Completion Date n/a, Medication Management and The person(s) responsible for carrying out the plan is  alpesh Anglin and Dr Alvarez Sor: Diagnosis and Treatment Plan explained to Liza Jack relates understanding diagnosis and is agreeable to Treatment Plan         Client Comments : Please share your thoughts, feelings, need and/or experiences regarding your treatment plan: He is in agreement with this treatment plan

## 2021-08-10 ENCOUNTER — OFFICE VISIT (OUTPATIENT)
Dept: PHYSICAL THERAPY | Facility: CLINIC | Age: 56
End: 2021-08-10
Payer: MEDICARE

## 2021-08-10 DIAGNOSIS — M17.11 PRIMARY OSTEOARTHRITIS OF RIGHT KNEE: Primary | ICD-10-CM

## 2021-08-10 PROCEDURE — 97112 NEUROMUSCULAR REEDUCATION: CPT

## 2021-08-10 PROCEDURE — 97110 THERAPEUTIC EXERCISES: CPT

## 2021-08-10 NOTE — PROGRESS NOTES
Daily Note     Today's date: 8/10/2021  Patient name: Yulissa Chen  : [birth year]  MRN: [id number]  Referring provider: Coral Whitten*  Dx:   Encounter Diagnosis     ICD-10-CM    1  Primary osteoarthritis of right knee  M17 11                   Subjective: Pt states his right knee is a bit sore today as he was playing golf yesterday  Objective: See treatment diary below      Assessment: Tolerated treatment well with no increase in pain  Pt was able to add exercises today with no increase in pain and fatigue near end reps  Pt required VC for SLR as he allows for a quad lag with out QS emphasis  Pt was given tband for TKE to add to his HEP  Patient would benefit from continued PT      Plan: Continue per plan of care        Precautions:na      Manuals  810            S/l quad st                                                    Neuro Re-Ed                                                                                                        Ther Ex             Bike nv 6 min           SLR x10 +QS 2  x10            QS 10 x:05 10 x 5"            HS 10 x :10 10 x10"            ADD Squeeze  5" 2  x10            TKE             clams  RTB 10x            SL SLR   10x            Ther Activity                                       Gait Training                                       Modalities

## 2021-08-12 ENCOUNTER — OFFICE VISIT (OUTPATIENT)
Dept: PHYSICAL THERAPY | Facility: CLINIC | Age: 56
End: 2021-08-12
Payer: MEDICARE

## 2021-08-12 DIAGNOSIS — M17.11 PRIMARY OSTEOARTHRITIS OF RIGHT KNEE: Primary | ICD-10-CM

## 2021-08-12 PROCEDURE — 97112 NEUROMUSCULAR REEDUCATION: CPT

## 2021-08-12 PROCEDURE — 97110 THERAPEUTIC EXERCISES: CPT

## 2021-08-12 NOTE — PROGRESS NOTES
Daily Note     Today's date: 2021  Patient name: Bel Leon  : [birth year]  MRN: [id number]  Referring provider: Olinda Garcia*  Dx:   Encounter Diagnosis     ICD-10-CM    1  Primary osteoarthritis of right knee  M17 11        Start Time: 0800  Stop Time: 0840  Total time in clinic (min): 40 minutes    Subjective: Patient states, "I'm sore this morning  I mowed the lawn and did some other yard work"  Still having pain starting at the medial knee going into the groin area (semimembranosus)  Additionally, notes steps are going better now noting improved neuromuscular control  Objective: See treatment diary below    Assessment: Tolerated treatment well  Patient would benefit from continued PT  Patient showing good progress towards his current goals  Showing good eccentric control during step downs  Able to progress exercises this visit with no adverse effect  I would expect show muscular soreness post therapy  Progress as able  Plan: Continue per plan of care        Precautions:na    Manuals  8/10  8/12          S/l quad st                                                    Neuro Re-Ed             Step ups   6" 2x10          Mini Squat     2x10                                                                           Ther Ex             Bike nv 6 min L4  6 min          SLR x10 +QS 2  x10  2x12          QS 10 x:05 10 x 5"  HEP          HS 10 x :10 10 x10"            ADD Squeeze  5" 2  x10  5" 2x10          TKE             clams  RTB 10x  RTB  10"x20          SL SLR   10x  2x10          HS stretch     20"x4                                    Ther Activity                                       Gait Training                                       Modalities

## 2021-08-17 ENCOUNTER — OFFICE VISIT (OUTPATIENT)
Dept: PHYSICAL THERAPY | Facility: CLINIC | Age: 56
End: 2021-08-17
Payer: MEDICARE

## 2021-08-17 DIAGNOSIS — G47.09 OTHER INSOMNIA: Primary | Chronic | ICD-10-CM

## 2021-08-17 DIAGNOSIS — M17.11 PRIMARY OSTEOARTHRITIS OF RIGHT KNEE: Primary | ICD-10-CM

## 2021-08-17 PROCEDURE — 97110 THERAPEUTIC EXERCISES: CPT | Performed by: PHYSICAL THERAPIST

## 2021-08-17 PROCEDURE — 97112 NEUROMUSCULAR REEDUCATION: CPT | Performed by: PHYSICAL THERAPIST

## 2021-08-17 PROCEDURE — 97530 THERAPEUTIC ACTIVITIES: CPT | Performed by: PHYSICAL THERAPIST

## 2021-08-17 RX ORDER — ZOLPIDEM TARTRATE 10 MG/1
10 TABLET ORAL
Qty: 30 TABLET | Refills: 1 | Status: SHIPPED | OUTPATIENT
Start: 2021-08-17 | End: 2021-10-15 | Stop reason: SDUPTHER

## 2021-08-17 NOTE — PROGRESS NOTES
Daily Note     Today's date: 2021  Patient name: Chad Marcelo  :   MRN: [id number]  Referring provider: Theresa Romero*  Dx: No diagnosis found  Subjective: Patient reports that his knee is feeling much stronger  Objective: See treatment diary below      Assessment: Tolerated treatment well  Patient would benefit from continued PTPatient notes moderate fatigue with glut exercises  Plan: Continue per plan of care        Precautions:na    Manuals 8/4 8/10  8/12 8/17         S/l quad st                                                    Neuro Re-Ed             Step ups   6" 2x10 8" 2 x10         Mini Squat     2x10          TG LVL 19    X 20         SLS - foam    5 x :10                                                Ther Ex             Bike nv 6 min L4  6 min 6'         SLR x10 +QS 2  x10  2x12 2 5# 2 x 10         QS 10 x:05 10 x 5"  HEP          HS 10 x :10 10 x10"   10 x :10         ADD Squeeze  5" 2  x10  5" 2x10 dc         TKE             clams  RTB 10x  RTB  10"x20 YTB x 20         SL SLR   10x  2x10 2 5# 2 x 10         HS/ADD St stretch     20"x4 4 x :20         Standing hip abd/ext    2 5# 2 x 10                      Ther Activity             LP     nv                      Gait Training                                       Modalities

## 2021-08-18 ENCOUNTER — SOCIAL WORK (OUTPATIENT)
Dept: BEHAVIORAL/MENTAL HEALTH CLINIC | Facility: CLINIC | Age: 56
End: 2021-08-18
Payer: MEDICARE

## 2021-08-18 DIAGNOSIS — F41.1 GAD (GENERALIZED ANXIETY DISORDER): ICD-10-CM

## 2021-08-18 DIAGNOSIS — F33.41 MAJOR DEPRESSIVE DISORDER, RECURRENT EPISODE, IN PARTIAL REMISSION (HCC): Primary | ICD-10-CM

## 2021-08-18 PROCEDURE — 90834 PSYTX W PT 45 MINUTES: CPT | Performed by: COUNSELOR

## 2021-08-18 NOTE — PSYCH
Psychotherapy Provided: Individual Psychotherapy 45 minutes   9:05am - 9:57am  Length of time in session: 52 minutes, follow up in 1 week    Encounter Diagnosis     ICD-10-CM    1  Major depressive disorder, recurrent episode[facility]  F33 41    2  ALYSHA (generalized anxiety disorder)  F41 1        Goals addressed in session: Goal 1     Pain:      none    0    Current suicide risk : Low     D: Reports he's been going to physical therapy, been picking up exercise he's been doing through therapy  Says he's been reconnecting with friends and going golfing for the first time in 21 years  "Really good" he reports seeing his friends, he says that he is considering meeting with them again and continuing his engagement with golf and exercise  PCT through strong rapport building work today, empathic listening, validation of feelings, reflection of content and feelings  A: Seems to be in a neutral mood with congruent affect, experiencing moderate symptoms of depression through lower mood at times, mild thoughts of hopelessness, lethargy and avolition at times, but seemingly reduced symptoms of depression in past month  No SI/HI apparent or reported, no CAVAZOS apparent or reported, seemed to respond well to PCT today  P: Discuss his timeline with his work where he was fired, checking in with grief his brother passing away at 32 in motorcycle accident  Continue with PCT work  Consider ET work  Behavioral Health Treatment Plan [facility]: Diagnosis and Treatment Plan explained to Linnette Novak relates understanding diagnosis and is agreeable to Treatment Plan   Yes

## 2021-08-19 ENCOUNTER — OFFICE VISIT (OUTPATIENT)
Dept: PHYSICAL THERAPY | Facility: CLINIC | Age: 56
End: 2021-08-19
Payer: MEDICARE

## 2021-08-19 DIAGNOSIS — M17.11 PRIMARY OSTEOARTHRITIS OF RIGHT KNEE: Primary | ICD-10-CM

## 2021-08-19 PROCEDURE — 97112 NEUROMUSCULAR REEDUCATION: CPT

## 2021-08-19 PROCEDURE — 97110 THERAPEUTIC EXERCISES: CPT

## 2021-08-19 NOTE — PROGRESS NOTES
Daily Note     Today's date: 2021  Patient name: Aisha Figueroa  :   MRN: [id number]  Referring provider: Geoffrey Michael*  Dx:   Encounter Diagnosis     ICD-10-CM    1  Primary osteoarthritis of right knee  M17 11                   Subjective: Patient states that he has noticed an improvement with strength and stability since starting therapy  Objective: See treatment diary below      Assessment: Tolerated treatment well  Patient exhibited good technique with therapeutic exercises      Plan: Continue per plan of care        Precautions:na    Manuals 8/4 8/10  8/12 8/17 8/19        S/l quad st                                                    Neuro Re-Ed             Step ups   6" 2x10 8" 2 x10 8"x 20         Mini Squat     2x10          TG LVL 19    X 20 3 x 10         SLS - foam    5 x :10 10" x 5                                               Ther Ex             Bike nv 6 min L4  6 min 6' 6 min         SLR x10 +QS 2  x10  2x12 2 5# 2 x 10 2 5# 2x10         QS 10 x:05 10 x 5"  HEP          HS 10 x :10 10 x10"   10 x :10 NP        ADD Squeeze  5" 2  x10  5" 2x10 dc DC        TKE             clams  RTB 10x  RTB  10"x20 YTB x 20 NP        SL SLR   10x  2x10 2 5# 2 x 10 3# 2 x 10         HS/ADD St stretch     20"x4 4 x :20 4 x 20"        Standing hip abd/ext    2 5# 2 x 10 3# 2 x 10 B        Seated hamstring stretch     30" x 4        Ther Activity             LP      # 2 x 10                      Gait Training                                       Modalities

## 2021-08-23 ENCOUNTER — OFFICE VISIT (OUTPATIENT)
Dept: PHYSICAL THERAPY | Facility: CLINIC | Age: 56
End: 2021-08-23
Payer: MEDICARE

## 2021-08-23 DIAGNOSIS — M17.11 PRIMARY OSTEOARTHRITIS OF RIGHT KNEE: Primary | ICD-10-CM

## 2021-08-23 PROCEDURE — 97112 NEUROMUSCULAR REEDUCATION: CPT | Performed by: PHYSICAL THERAPIST

## 2021-08-23 PROCEDURE — 97110 THERAPEUTIC EXERCISES: CPT | Performed by: PHYSICAL THERAPIST

## 2021-08-23 PROCEDURE — 97530 THERAPEUTIC ACTIVITIES: CPT | Performed by: PHYSICAL THERAPIST

## 2021-08-23 NOTE — PROGRESS NOTES
Daily Note     Today's date: 2021  Patient name: Aisha Figueroa  : [birth year]  MRN: [id number]  Referring provider: Geoffrey Michael*  Dx:   Encounter Diagnosis     ICD-10-CM    1  Primary osteoarthritis of right knee  M17 11                   Subjective: Patient reports that he mowed the lawn and felt unsteady and pain in the R LE       Objective: See treatment diary below      Assessment: Tolerated treatment well  Patient would benefit from continued PT  Supervised by KD, PTA        Plan: Continue per plan of care        Precautions:na    Manuals  8/10  8/12 8/17 8/19 8/23       S/l quad st                                                    Neuro Re-Ed             Step ups   6" 2x10 8" 2 x10 8"x 20  8" x 20       Mini Squat     2x10          TG LVL 19    X 20 3 x 10  3 x :10       SLS - foam    5 x :10 10" x 5 10 x :10       Side stepping/Tandem/Bkwd on foam      5 laps - all                                 Ther Ex             Bike nv 6 min L4  6 min 6' 6 min  6'       SLR x10 +QS 2  x10  2x12 2 5# 2 x 10 2 5# 2x10  3# 2 x 10       QS 10 x:05 10 x 5"  HEP          HS 10 x :10 10 x10"   10 x :10 NP dc       ADD Squeeze  5" 2  x10  5" 2x10 dc DC        TKE             clams  RTB 10x  RTB  10"x20 YTB x 20 NP YTB        SL SLR   10x  2x10 2 5# 2 x 10 3# 2 x 10  3# 2 x 10       HS/ADD St stretch     20"x4 4 x :20 4 x 20"        Standing hip abd/ext    2 5# 2 x 10 3# 2 x 10 B        Seated hamstring stretch     30" x 4        Ther Activity             LP      # 2 x 10  105# SL 2 x 10                    Gait Training                                       Modalities

## 2021-08-26 ENCOUNTER — OFFICE VISIT (OUTPATIENT)
Dept: PHYSICAL THERAPY | Facility: CLINIC | Age: 56
End: 2021-08-26
Payer: MEDICARE

## 2021-08-26 DIAGNOSIS — M17.11 PRIMARY OSTEOARTHRITIS OF RIGHT KNEE: Primary | ICD-10-CM

## 2021-08-26 PROCEDURE — 97110 THERAPEUTIC EXERCISES: CPT

## 2021-08-26 NOTE — PROGRESS NOTES
Daily Note     Today's date: 2021  Patient name: Yulissa Chen  :   MRN: [id number]  Referring provider: Coral Whitten*  Dx: No diagnosis found  Subjective: Patient states that he has noticed an improvement with strength however pain intensity and frequency has not changed  Continues to have the most pain at night which disrupts his sleep  Objective: See treatment diary below      Assessment: Tolerated treatment well  Patient exhibited good technique with therapeutic exercises      Plan: Continue per plan of care        Precautions:na    Manuals 8/4 8/10  8/12 8/17 8/19 8/23 8/26      S/l quad st                                                    Neuro Re-Ed             Step ups   6" 2x10 8" 2 x10 8"x 20  8" x 20       Mini Squat     2x10          TG LVL 19    X 20 3 x 10  3 x :10 3 x 10       SLS - foam    5 x :10 10" x 5 10 x :10       Side stepping/Tandem/Bkwd on foam      5 laps - all GTB x 5                                Ther Ex             Bike nv 6 min L4  6 min 6' 6 min  6' 6      SLR x10 +QS 2  x10  2x12 2 5# 2 x 10 2 5# 2x10  3# 2 x 10                                 Gastroc wedge stretch       20" x5       TKE             clams  RTB 10x  RTB  10"x20 YTB x 20 NP YTB        SL SLR   10x  2x10 2 5# 2 x 10 3# 2 x 10  3# 2 x 10       HS/ADD St stretch     20"x4 4 x :20 4 x 20" 20" x 4 20" x 5       Standing hip abd/ext    2 5# 2 x 10 3# 2 x 10 B GTB x 20  GTB x 20       Seated hamstring stretch     30" x 4 20" x 5  20" x 5       Piriformis stretch       20' x 5       N  glide       1" x 10       Ther Activity             LP      # 2 x 10  105# SL 2 x 10 105# 2 x 10 SL                   Gait Training                                       Modalities

## 2021-08-30 ENCOUNTER — OFFICE VISIT (OUTPATIENT)
Dept: PHYSICAL THERAPY | Facility: CLINIC | Age: 56
End: 2021-08-30
Payer: MEDICARE

## 2021-08-30 DIAGNOSIS — M17.11 PRIMARY OSTEOARTHRITIS OF RIGHT KNEE: Primary | ICD-10-CM

## 2021-08-30 PROCEDURE — 97110 THERAPEUTIC EXERCISES: CPT | Performed by: PHYSICAL THERAPIST

## 2021-08-30 PROCEDURE — 97530 THERAPEUTIC ACTIVITIES: CPT | Performed by: PHYSICAL THERAPIST

## 2021-08-30 PROCEDURE — 97140 MANUAL THERAPY 1/> REGIONS: CPT | Performed by: PHYSICAL THERAPIST

## 2021-08-30 PROCEDURE — 97112 NEUROMUSCULAR REEDUCATION: CPT | Performed by: PHYSICAL THERAPIST

## 2021-08-30 NOTE — PROGRESS NOTES
Daily Note     Today's date: 2021  Patient name: Aisha Figueroa  :   MRN: [id number]  Referring provider: Geoffrey Michael*  Dx:   Encounter Diagnosis     ICD-10-CM    1  Primary osteoarthritis of right knee  M17 11                   Subjective: Patient reports that he played golf and had discomfort in the adductor longus afterward  Objective: See treatment diary below      Assessment: Tolerated treatment well  Patient would benefit from continued PT      Plan: Continue per plan of care        Precautions:na    Manuals 8/4 8/10  8/12 8/17 8/19 8/23 8/26 8/30     S/l quad st             IASTM ADD longus        8'                               Neuro Re-Ed             Step ups   6" 2x10 8" 2 x10 8"x 20  8" x 20       Mini Squat     2x10          TG LVL 19    X 20 3 x 10  3 x :10 3 x 10  3 x 10     SLS - foam    5 x :10 10" x 5 10 x :10 10 x :10 10 x :10     Side stepping/Tandem/Bkwd on foam      5 laps - all GTB x 5 GTB x 5                               Ther Ex             Bike nv 6 min L4  6 min 6' 6 min  6' 6 6'     SLR x10 +QS 2  x10  2x12 2 5# 2 x 10 2 5# 2x10  3# 2 x 10  3#2x10                               Gastroc wedge stretch       20" x5  5 x :20     TKE             clams  RTB 10x  RTB  10"x20 YTB x 20 NP YTB        SL SLR   10x  2x10 2 5# 2 x 10 3# 2 x 10  3# 2 x 10  3# 2 x 10     HS/ADD St stretch     20"x4 4 x :20 4 x 20" 20" x 4 20" x 5  5 x :20     Standing hip abd/ext    2 5# 2 x 10 3# 2 x 10 B GTB x 20  GTB x 20  GTB x 20     Seated hamstring stretch     30" x 4 20" x 5  20" x 5       Piriformis stretch       20' x 5  hep     N  glide       1" x 10  1" x 10     Ther Activity             LP      # 2 x 10  105# SL 2 x 10 105# 2 x 10 # sl  2x10                  Gait Training                                       Modalities

## 2021-09-02 ENCOUNTER — EVALUATION (OUTPATIENT)
Dept: PHYSICAL THERAPY | Facility: CLINIC | Age: 56
End: 2021-09-02
Payer: MEDICARE

## 2021-09-02 DIAGNOSIS — M17.11 PRIMARY OSTEOARTHRITIS OF RIGHT KNEE: Primary | ICD-10-CM

## 2021-09-02 PROCEDURE — 97110 THERAPEUTIC EXERCISES: CPT

## 2021-09-02 PROCEDURE — 97112 NEUROMUSCULAR REEDUCATION: CPT

## 2021-09-02 PROCEDURE — 97140 MANUAL THERAPY 1/> REGIONS: CPT

## 2021-09-02 NOTE — PROGRESS NOTES
Daily Note     Today's date: 2021  Patient name: Sharonda Hammonds  :   MRN: [id number]  Referring provider: Annamaria South*  Dx: No diagnosis found   /             Subjective: Patient states that he continues to notice moderate medial distal hamstring pain and is TTP with MT       Objective: See treatment diary below      Assessment: Tolerated treatment well  Patient exhibited good technique with therapeutic exercises      Plan: Continue per plan of care        Precautions:na    Manuals 8/4 8/10  8/12 8/17 8/19 8/23 8/26 8/30 9/    S/l quad st             IASTM ADD longus        8' 8                              Neuro Re-Ed             Step ups   6" 2x10 8" 2 x10 8"x 20  8" x 20       Mini Squat     2x10          TG LVL 19    X 20 3 x 10  3 x :10 3 x 10  3 x 10 lvl 20 x 20     SLS - foam    5 x :10 10" x 5 10 x :10 10 x :10 10 x :10 10"x 10     Side stepping/Tandem/Bkwd on foam      5 laps - all GTB x 5 GTB x 5 GTB 5 laps                              Ther Ex             Bike nv 6 min L4  6 min 6' 6 min  6' 6 6' 6    SLR x10 +QS 2  x10  2x12 2 5# 2 x 10 2 5# 2x10  3# 2 x 10  3#2x10 3# 3 x 10                               Gastroc wedge stretch       20" x5  5 x :20 5 x 20"    TKE             clams  RTB 10x  RTB  10"x20 YTB x 20 NP YTB        SL SLR   10x  2x10 2 5# 2 x 10 3# 2 x 10  3# 2 x 10  3# 2 x 10 3# 2x 10    HS/ADD St stretch     20"x4 4 x :20 4 x 20" 20" x 4 20" x 5  5 x :20 5 x 20"    Standing hip abd/ext    2 5# 2 x 10 3# 2 x 10 B GTB x 20  GTB x 20  GTB x 20 GTB 3 x 10     Seated hamstring stretch     30" x 4 20" x 5  20" x 5       Piriformis stretch       20' x 5  hep     N  glide       1" x 10  1" x 10 NV    Ther Activity             LP      # 2 x 10  105# SL 2 x 10 105# 2 x 10 # sl  2x10 110# sl 3 x 10                  Gait Training                                       Modalities

## 2021-09-07 ENCOUNTER — OFFICE VISIT (OUTPATIENT)
Dept: PHYSICAL THERAPY | Facility: CLINIC | Age: 56
End: 2021-09-07
Payer: MEDICARE

## 2021-09-07 DIAGNOSIS — M17.11 PRIMARY OSTEOARTHRITIS OF RIGHT KNEE: Primary | ICD-10-CM

## 2021-09-07 PROCEDURE — 97530 THERAPEUTIC ACTIVITIES: CPT | Performed by: PHYSICAL THERAPIST

## 2021-09-07 PROCEDURE — 97112 NEUROMUSCULAR REEDUCATION: CPT | Performed by: PHYSICAL THERAPIST

## 2021-09-07 PROCEDURE — 97110 THERAPEUTIC EXERCISES: CPT | Performed by: PHYSICAL THERAPIST

## 2021-09-07 NOTE — PROGRESS NOTES
Daily Note     Today's date: 2021  Patient name: Bel Leon  :   MRN: [id number]  Referring provider: Olinda Garcia*  Dx:   Encounter Diagnosis     ICD-10-CM    1  Primary osteoarthritis of right knee  M17 11                   Subjective: Patient reports that he was able to mow more of his grass without knee pain  Objective: See treatment diary below      Assessment: Tolerated treatment well  Patient would benefit from continued PT  Patient able to perform 30 SLR without rest today  Plan: Continue per plan of care        Precautions:na    Manuals  8/10  8/12 8/17 8/19 8/23 8/26 8/30 9/2 9/7   S/l quad st             IASTM ADD longus        8' 8 8                             Neuro Re-Ed             Step ups   6" 2x10 8" 2 x10 8"x 20  8" x 20       Mini Squat     2x10          TG LVL     X 20 3 x 10  3 x :10 3 x 10  3 x 10 lvl 20 x 20  LVL 20 x :20   SLS - foam    5 x :10 10" x 5 10 x :10 10 x :10 10 x :10 10"x 10  10 x :10   Side stepping/Tandem/Bkwd on foam      5 laps - all GTB x 5 GTB x 5 GTB 5 laps GTB 5 laps                             Ther Ex             Bike nv 6 min L4  6 min 6' 6 min  6' 6 6' 6 7'   SLR x10 +QS 2  x10  2x12 2 5# 2 x 10 2 5# 2x10  3# 2 x 10  3#2x10 3# 3 x 10  3# 30                             Gastroc wedge stretch       20" x5  5 x :20 5 x 20" 5 x :20   TKE             clams  RTB 10x  RTB  10"x20 YTB x 20 NP YTB        SL SLR   10x  2x10 2 5# 2 x 10 3# 2 x 10  3# 2 x 10  3# 2 x 10 3# 2x 10 3# 2 x 10   HS/ADD St stretch     20"x4 4 x :20 4 x 20" 20" x 4 20" x 5  5 x :20 5 x 20" 5 x :20   Standing hip abd/ext    2 5# 2 x 10 3# 2 x 10 B GTB x 20  GTB x 20  GTB x 20 GTB 3 x 10  GTB 3 x 10   Seated hamstring stretch     30" x 4 20" x 5  20" x 5       Piriformis stretch       20' x 5  hep     N  glide       1" x 10  1" x 10 NV    Ther Activity             LP      # 2 x 10  105# SL 2 x 10 105# 2 x 10 # sl  2x10 110# sl 3 x 10  110# 3x10   SLS foam  Ball toss          gball x 20   Gait Training                                       Modalities

## 2021-09-08 ENCOUNTER — SOCIAL WORK (OUTPATIENT)
Dept: BEHAVIORAL/MENTAL HEALTH CLINIC | Facility: CLINIC | Age: 56
End: 2021-09-08
Payer: MEDICARE

## 2021-09-08 DIAGNOSIS — F33.41 MAJOR DEPRESSIVE DISORDER, RECURRENT EPISODE, IN PARTIAL REMISSION (HCC): Primary | ICD-10-CM

## 2021-09-08 DIAGNOSIS — F41.1 GAD (GENERALIZED ANXIETY DISORDER): ICD-10-CM

## 2021-09-08 PROCEDURE — 90834 PSYTX W PT 45 MINUTES: CPT | Performed by: COUNSELOR

## 2021-09-08 NOTE — PSYCH
Psychotherapy Provided: Individual Psychotherapy 45 minutes   10:03am - 10:48am  Length of time in session: 45 minutes, follow up in 1 week    Encounter Diagnosis     ICD-10-CM    1  Major depressive disorder, recurrent episode, [facility] partial [facility] ([facility] )  F33 41    2  ALYSHA (generalized anxiety disorder)  F41 1        Goals addressed in session: Goal 1     Pain:      none    0    Current suicide risk : Low     Time: 10:03am - 10:48am    D: "The strength and stability is getting a lot better" regarding his knee  "We had just started getting close again" regarding his brother, stopped working with [employer] after his brother passed away in 2001  Discussed his timeline with his work where he was fired, started in 2003 at [employer] [place], he says he loved his job  He says he only ever had two issues, in 2004 his pre-existing back condition was aggravated and he went through [employer] comp for a couple weeks  They attempted to fire him them, intimidated him to lie about his injury  His union stepped in and reversed his firing  For about 8 years his job went well, 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  In 2012 he noticed beginning of carpal tunnel  He went to various doctors all approved by his work  He pointed out that 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 and 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  Someone came into his doctor's visit with his doctor to declare that his injury was not work related for [employer], but his wrists were considered work related  Went back sept 2012 after surgery and work rehab, but was sent home  Called him back in October to resume work  A: True Hunt appeared to be in a neutral mood with congruent affect, seemed to be experiencing mild symptoms of anxiety through racing thoughts and intrusive memories at times, appeared to be kempt, no SI/HI nor CAVAZOS risk apparent or reported, seemed to respond well to PCT work today  P: Resume discussion at October 2012  Grief his brother passing away at 34 in motorcycle accident  Continue with PCT work  Consider ET work  Check in with physical therapy  Behavioral Health Treatment Plan [facility]: Diagnosis and Treatment Plan explained to Adolfo Garcia relates understanding diagnosis and is agreeable to Treatment Plan   Yes

## 2021-09-09 ENCOUNTER — OFFICE VISIT (OUTPATIENT)
Dept: PHYSICAL THERAPY | Facility: CLINIC | Age: 56
End: 2021-09-09
Payer: MEDICARE

## 2021-09-09 DIAGNOSIS — M17.11 PRIMARY OSTEOARTHRITIS OF RIGHT KNEE: Primary | ICD-10-CM

## 2021-09-09 PROCEDURE — 97110 THERAPEUTIC EXERCISES: CPT

## 2021-09-09 PROCEDURE — 97530 THERAPEUTIC ACTIVITIES: CPT

## 2021-09-09 PROCEDURE — 97112 NEUROMUSCULAR REEDUCATION: CPT

## 2021-09-09 PROCEDURE — 97140 MANUAL THERAPY 1/> REGIONS: CPT

## 2021-09-09 NOTE — PROGRESS NOTES
Daily Note     Today's date: 2021  Patient name: Ronaldo Joy  :   MRN: [id number]  Referring provider: Jennelle Rinne*  Dx:   Encounter Diagnosis     ICD-10-CM    1  Primary osteoarthritis of right knee  M17 11                   Subjective: Patient states that he has noticed an improvement with strength and stability however continues to expereince frequent m spasm in hip adductor, particularly at night  Objective: See treatment diary below      Assessment: Tolerated treatment well  Patient exhibited good technique with therapeutic exercises    longus  Plan: Continue per plan of care        Precautions:na    Manuals    S/l quad st             IASTM ADD longus 8       8' 8 8   (-) IASTM add  6                         Neuro Re-Ed             Step ups             Mini Squat               TG LVL  lvl 20 x 20      3 x 10  3 x 10 lvl 20 x 20  LVL 20 x :20   SLS - foam 10"x 10       10 x :10 10 x :10 10"x 10  10 x :10   Side stepping/Tandem/Bkwd on foam GTB 5 laps       GTB x 5 GTB x 5 GTB 5 laps GTB 5 laps                             Ther Ex             Bike 8      6 6' 6 7'   SLR 3# 30        3#2x10 3# 3 x 10  3# 30                             Gastroc wedge stretch 5 x 20"      20" x5  5 x :20 5 x 20" 5 x :20   TKE             clams             SL SLR  3# 30        3# 2 x 10 3# 2x 10 3# 2 x 10   HS/ADD St stretch 5x 20"      20" x 5  5 x :20 5 x 20" 5 x :20   Standing hip abd/ext GTB 30x      GTB x 20  GTB x 20 GTB 3 x 10  GTB 3 x 10   Seated hamstring stretch       20" x 5       Piriformis stretch       20' x 5  hep     N  glide       1" x 10  1" x 10 NV    Ther Activity             LP  110# 3 x 10       105# 2 x 10 # sl  2x10 110# sl 3 x 10  110# 3x10   SLS foam  Ball toss          gball x 20   Gait Training                                       Modalities

## 2021-09-14 ENCOUNTER — APPOINTMENT (OUTPATIENT)
Dept: PHYSICAL THERAPY | Facility: CLINIC | Age: 56
End: 2021-09-14
Payer: MEDICARE

## 2021-09-16 ENCOUNTER — APPOINTMENT (OUTPATIENT)
Dept: PHYSICAL THERAPY | Facility: CLINIC | Age: 56
End: 2021-09-16
Payer: MEDICARE

## 2021-09-21 ENCOUNTER — OFFICE VISIT (OUTPATIENT)
Dept: PHYSICAL THERAPY | Facility: CLINIC | Age: 56
End: 2021-09-21
Payer: MEDICARE

## 2021-09-21 ENCOUNTER — TELEPHONE (OUTPATIENT)
Dept: OBGYN CLINIC | Facility: HOSPITAL | Age: 56
End: 2021-09-21

## 2021-09-21 DIAGNOSIS — M17.11 PRIMARY OSTEOARTHRITIS OF RIGHT KNEE: Primary | ICD-10-CM

## 2021-09-21 PROCEDURE — 97112 NEUROMUSCULAR REEDUCATION: CPT

## 2021-09-21 PROCEDURE — 97140 MANUAL THERAPY 1/> REGIONS: CPT

## 2021-09-21 PROCEDURE — 97110 THERAPEUTIC EXERCISES: CPT

## 2021-09-21 NOTE — TELEPHONE ENCOUNTER
I spoke to patient and he said someone just called him  I apologized because I did not see anything in chart yet  So I did advise of below again  Patient verbalized understanding and he was scheduled with Dr Sheila Pink

## 2021-09-21 NOTE — TELEPHONE ENCOUNTER
As per Dr Bullard File and review of the chart he does have severe arthritis of the knee  He does not believe an MRI is appropriate at this time suggest that a more appropriate progression of care would be a referral to Dr Ac Hickman for discussion of injections versus total knee arthroplasty

## 2021-09-21 NOTE — TELEPHONE ENCOUNTER
Dr Butch Sheppard    [phone]    Patient is  requesting an MRI of his right knee  He has been going to therapy and his pain has not improved  He is having 9/10 pain  Please advise

## 2021-09-21 NOTE — PROGRESS NOTES
Daily Note     Today's date: 2021  Patient name: Alfie Torre  : [birth year]  MRN: [id number]  Referring provider: Ezequiel De Luna*  Dx:   Encounter Diagnosis     ICD-10-CM    1  Primary osteoarthritis of right knee  M17 11                   Subjective: Patient states that he was on vacation last week and tried to play baseball and run however patient describes severe       Objective: See treatment diary below      Assessment: Tolerated treatment well  Patient exhibited good technique with therapeutic exercises      Plan: Continue per plan of care        Precautions:na    Manuals            S/l quad st             IASTM ADD longus 8 8           (-) IASTM add  6 6                        Neuro Re-Ed             Step ups             Mini Squat               TG LVL  lvl 20 x 20            SLS - foam 10"x 10             Side stepping/Tandem/Bkwd on foam GTB 5 laps                                       Ther Ex             Bike 8 8           SLR 3# 30                                       Gastroc wedge stretch 5 x 20" 5 x 20"           TKE             clams             SL SLR  3# 30             HS/ADD St stretch 5x 20" 5 x 20"           Standing hip abd/ext GTB 30x            Seated hamstring stretch             Piriformis stretch             N  glide   x10           Ther Activity             LP  110# 3 x 10             SLS foam  Ball toss             Gait Training                                       Modalities

## 2021-09-22 ENCOUNTER — SOCIAL WORK (OUTPATIENT)
Dept: BEHAVIORAL/MENTAL HEALTH CLINIC | Facility: CLINIC | Age: 56
End: 2021-09-22
Payer: MEDICARE

## 2021-09-22 DIAGNOSIS — F33.41 MAJOR DEPRESSIVE DISORDER, RECURRENT EPISODE, IN PARTIAL REMISSION (HCC): Primary | ICD-10-CM

## 2021-09-22 PROCEDURE — 90834 PSYTX W PT 45 MINUTES: CPT | Performed by: COUNSELOR

## 2021-09-22 NOTE — PSYCH
Psychotherapy Provided: Individual Psychotherapy 45 minutes     Length of time in session: 52 minutes, follow up in 2 week    Encounter Diagnosis     ICD-10-CM    1  Major depressive disorder, recurrent episode, in partial remission ([facility] )  F33 41        Goals addressed in session: Goal 1     Pain:      none    0    Current suicide risk : Low     Time: 9:00am - 9:52am  Previous session plan: Resume discussion at October 2012  Grief his brother passing away at 34 in motorcycle accident  Continue with PCT work  Consider ET work  Check in with physical therapy  He reports he had a good trip with his girlfriend and seeing his son  D: He says he hit a bit of a setback regarding his knee, has been having trouble with pain, keeping him up at night  Has appointment October 8th for his knee to be seen by another specialist  Resumed discussion about event in which he was fired in 2012, resuming at October 2012  Says his new supervisor never followed along with him on the job, like they usually would  Says there were 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 that he had reported, but nothing was ever fixed  Found total disregard for personal, employee safety until he was fired on December 12th 2012  Reported on multiple instances in which he needed to reject a trailer for safety reasons  A: Yulissa Chen appeared to be in a neutral mood with congruent affect, seemed to be experiencing moderate symptoms of depression through lower mood, at times, lethargy, appeared to be kempt, no SI/HI nor CAVAZOS risk apparent or reported, seemed to respond well to PCT today  P: Continue with discussion about his narrative at December 13th 2012 in which he was told to load the trailer  Grief his brother passing away at 34 in motorcycle accident  Continue with PCT work  Consider ET work  Check in with physical therapy  He reports he had a good trip with his girlfriend and seeing his son         [address]: Diagnosis and Treatment Plan explained to Andrea Elgin relates understanding diagnosis and is agreeable to Treatment Plan   Yes

## 2021-09-23 ENCOUNTER — OFFICE VISIT (OUTPATIENT)
Dept: PHYSICAL THERAPY | Facility: CLINIC | Age: 56
End: 2021-09-23
Payer: MEDICARE

## 2021-09-23 DIAGNOSIS — M17.11 PRIMARY OSTEOARTHRITIS OF RIGHT KNEE: Primary | ICD-10-CM

## 2021-09-23 PROCEDURE — 97110 THERAPEUTIC EXERCISES: CPT

## 2021-09-23 PROCEDURE — 97112 NEUROMUSCULAR REEDUCATION: CPT

## 2021-09-23 PROCEDURE — 97140 MANUAL THERAPY 1/> REGIONS: CPT

## 2021-09-28 ENCOUNTER — OFFICE VISIT (OUTPATIENT)
Dept: PHYSICAL THERAPY | Facility: CLINIC | Age: 56
End: 2021-09-28
Payer: MEDICARE

## 2021-09-28 DIAGNOSIS — M17.11 PRIMARY OSTEOARTHRITIS OF RIGHT KNEE: Primary | ICD-10-CM

## 2021-09-28 PROCEDURE — 97110 THERAPEUTIC EXERCISES: CPT | Performed by: PHYSICAL THERAPIST

## 2021-09-28 PROCEDURE — 97140 MANUAL THERAPY 1/> REGIONS: CPT | Performed by: PHYSICAL THERAPIST

## 2021-09-28 PROCEDURE — 97112 NEUROMUSCULAR REEDUCATION: CPT | Performed by: PHYSICAL THERAPIST

## 2021-09-28 NOTE — PROGRESS NOTES
PT Re-evaluation and Discharge    Today's date: 21  Patient name: Chad Marcelo  :   MRN: [id number]  Referring provider: Theresa Romero*  Dx:   Encounter Diagnosis     ICD-10-CM    1  Primary osteoarthritis of right knee  M17 11 Ambulatory referral to Physical Therapy                  Assessment    Kendra Garcia has been attending physical therapy for L knee OA  He has made excellent progress in increasing his ROM and strength, He does continue to have moderate medial knee pain and instability with more challenging surfaces and activities  He is putting physical therapy on hold until consult for orthopedist for possible injections  Thank you for your referral         Assessment details: Chad Marcelo is a 49 y o  male with Primary osteoarthritis of right knee  He presents with pain, decreased functional ability, decreased ROM, and , ambulatory dysfunction  Due to these impairments, Patient has difficulty performing a/iadls, recreational activities and engaging in social activities  Patient's clinical presentation is consistent with their referring diagnosis  Patient would benefit from skilled physical therapy to address their aforementioned impairments, improve their level of function and to improve their overall quality of life   has been given a home exercise program and is in agreement with the plan of care  Thank you for your referral   Impairments: abnormal or restricted ROM, lacks appropriate home exercise program and pain with function    Symptom irritability: moderateUnderstanding of Dx/Px/POC: excellent   Prognosis: good    Goals  ST Goals - 2-4 weeks  1  Patient will report decreased pain with activity by at least 2 points within 4 weeks - not met  2  Patient will improve ROM 5-10 degrees within 4 weeks - met  3  Patient will perform IADLs without pain in 2 weeks - partially met  4  Patient will increase strength by 25% in 4 weeks - met    LT Goals - Discharge  1   Patient will improve FOTO score to maximum stated or greater by discharge  2  Patient will return to preferred recreational activity without significant pain increase by discharge   3  Patient will return to all work related activities without pain by discharge     Plan  Patient would benefit from: skilled physical therapy  Planned therapy interventions: manual therapy, muscle pump exercises, neuromuscular re-education, stretching, strengthening, therapeutic exercise, therapeutic activities and home exercise program  Frequency: 2x week  Duration in visits: 20  Duration in weeks: 20  Plan of Care beginning date: 8/4/2021  Plan of Care expiration date: 11/4/2021  Treatment plan discussed with: patient        Subjective Evaluation    History of Present Illness  Mechanism of injury: Patient reports that he returned to working out at the gym in May and began having R knee pain  He states that he was doing knee exercises particularly the knee extensions which may have aggravated his knee  He has had a history of R knee surgery on his patellar tendon in high school (1980s)  CC:  Pain at the medial joint line  He notes swelling "sometimes"  He notes that pain radiates up into the adductor longus region  He  states that he needs to do a step too pattern for stair climbing  Function:  He notes pain when he gets out of his car  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  He has also gone to the driving range and had pain afterwards  Mowing the grass has also aggravated the knee  Pain can wake him up without medications  9/28/21:  Patient reports that in the past few weeks he has had an increase in medial knee pain due to increased activity  He was trying to run on the baseball field and attempting to mow the grass  He states that the mowing has been moderately painful due to uneven surface    He notes that all the pain is medially and sometimes radiates around the entire knee  Recurrent probem    Quality of life: excellent    Pain  Current pain ratin  At worst pain rating: 3  Quality: pressure and sharp  Relieving factors: ice, heat and medications  Aggravating factors: walking, standing and stair climbing  Progression: no change    Social Support  Steps to enter house: yes  Stairs in house: yes   Lives in: multiple-level home  Lives with: spouse and young children    Employment status: working    Diagnostic Tests  X-ray: abnormal  Treatments  Previous treatment: physical therapy  Patient Goals  Patient goals for therapy: decreased pain, increased motion, return to sport/leisure activities and independence with ADLs/IADLs          Objective     Tenderness     Right Knee  Pez anserine, MCL, MJL    Active Range of Motion   Left Knee   Flexion: 135 degrees   Extension: 2 degrees   Extensor lag: 3 degrees     Right Knee   Flexion: 120 degrees   / 128 degrees  Extension: 3 degrees  / 3 degrees  Extensor la degrees  /  5 degrees    Additional Active Range of Motion Details  Hip Strength:  R:  wnl      L:  wnl    Mobility   Patellar Mobility:     Right Knee   WFL: medial, lateral, superior and inferior    Patellar Static Positioning   Right Knee: [facility]    Strength/Myotome Testing     Left Knee   Normal strength    Right Knee   Normal strength  Quadriceps contraction: fair    Tests     Right Knee   Negative anterior drawer, anterior Lachman, Apley's compression, Apley's distraction, valgus stress test at 0 degrees and valgus stress test at 30 degrees  Swelling     Left Knee Girth Measurement (cm)   Joint line: 47 cm   / 46 5 cm  10 cm above joint line: 50 5 cm  / 57 2 cm    10 cm below joint line: 46 cm    /  46 cm    Right Knee Girth Measurement (cm)   Joint line: 46 5 cm  / 47 3 cm  10 cm above joint line: 51 cm / 59 cm  10 cm below joint line: 47 cm  / 46 cm    Functional Assessment      Squat    Trunk lean left       Single Leg Stance - Eyes Open   Left  Trial 1: 10 seconds

## 2021-09-28 NOTE — PROGRESS NOTES
Daily Note     Today's date: 2021  Patient name: Naseem Murguia  :   MRN: [id number]  Referring provider: Merlinda Mote*  Dx:   Encounter Diagnosis     ICD-10-CM    1  Primary osteoarthritis of right knee  M17 11                   Subjective: see REV      Objective: See treatment diary below      Assessment: Tolerated treatment well  Patient would benefit from continued PT      Plan: Continue per plan of care        Precautions:na    Manuals          S/l quad st             IASTM ADD longus - pez anserie 8 8 8 8'         (-) IASTM add  6 6 6 6'                      Neuro Re-Ed             Step ups             Mini Squat               TG LVL  lvl 20 x 20   LVL 20 x 20         SLS - foam 10"x 10    10 x :10         Side stepping/Tandem/Bkwd on foam GTB 5 laps    5 laps ea                                   Ther Ex             Bike 8 8 8' 7'         SLR 3# 30   30x          REV    15                      Gastroc wedge stretch 5 x 20" 5 x 20" 5x20" 5 x :20         TKE             clams             SL SLR  3# 30   30x 2 5#          HS/ADD St stretch 5x 20" 5 x 20" 5x20" dc         Standing hip abd/ext GTB 30x  30x GTB          Seated hamstring stretch             Piriformis stretch             N  glide   x10 -          Ther Activity             LP  110# 3 x 10             SLS foam  Ball toss             Gait Training                                       Modalities

## 2021-09-30 ENCOUNTER — APPOINTMENT (OUTPATIENT)
Dept: PHYSICAL THERAPY | Facility: CLINIC | Age: 56
End: 2021-09-30
Payer: MEDICARE

## 2021-10-06 ENCOUNTER — OFFICE VISIT (OUTPATIENT)
Dept: OBGYN CLINIC | Facility: CLINIC | Age: 56
End: 2021-10-06
Payer: MEDICARE

## 2021-10-06 VITALS
SYSTOLIC BLOOD PRESSURE: 146 MMHG | HEIGHT: 70 IN | WEIGHT: 300 LBS | HEART RATE: 80 BPM | BODY MASS INDEX: 42.95 KG/M2 | DIASTOLIC BLOOD PRESSURE: 80 MMHG

## 2021-10-06 DIAGNOSIS — M17.11 PRIMARY OSTEOARTHRITIS OF RIGHT KNEE: Primary | ICD-10-CM

## 2021-10-06 DIAGNOSIS — M25.461 EFFUSION OF RIGHT KNEE: ICD-10-CM

## 2021-10-06 DIAGNOSIS — M25.561 CHRONIC PAIN OF RIGHT KNEE: ICD-10-CM

## 2021-10-06 DIAGNOSIS — G89.29 CHRONIC PAIN OF RIGHT KNEE: ICD-10-CM

## 2021-10-06 PROCEDURE — 99214 OFFICE O/P EST MOD 30 MIN: CPT | Performed by: ORTHOPAEDIC SURGERY

## 2021-10-06 PROCEDURE — 20610 DRAIN/INJ JOINT/BURSA W/O US: CPT | Performed by: ORTHOPAEDIC SURGERY

## 2021-10-06 RX ORDER — TRIAMCINOLONE ACETONIDE 40 MG/ML
80 INJECTION, SUSPENSION INTRA-ARTICULAR; INTRAMUSCULAR
Status: COMPLETED | OUTPATIENT
Start: 2021-10-06 | End: 2021-10-06

## 2021-10-06 RX ORDER — BUPIVACAINE HYDROCHLORIDE 5 MG/ML
6 INJECTION, SOLUTION EPIDURAL; INTRACAUDAL
Status: COMPLETED | OUTPATIENT
Start: 2021-10-06 | End: 2021-10-06

## 2021-10-06 RX ADMIN — BUPIVACAINE HYDROCHLORIDE 6 ML: 5 INJECTION, SOLUTION EPIDURAL; INTRACAUDAL at 11:34

## 2021-10-06 RX ADMIN — TRIAMCINOLONE ACETONIDE 80 MG: 40 INJECTION, SUSPENSION INTRA-ARTICULAR; INTRAMUSCULAR at 11:34

## 2021-10-10 DIAGNOSIS — F33.2 MAJOR DEPRESSIVE DISORDER, RECURRENT, SEVERE WITHOUT PSYCHOTIC FEATURES (HCC): Primary | Chronic | ICD-10-CM

## 2021-10-10 DIAGNOSIS — F41.1 GAD (GENERALIZED ANXIETY DISORDER): Chronic | ICD-10-CM

## 2021-10-11 RX ORDER — VENLAFAXINE HYDROCHLORIDE 150 MG/1
150 CAPSULE, EXTENDED RELEASE ORAL DAILY
Qty: 30 CAPSULE | Refills: 0 | Status: SHIPPED | OUTPATIENT
Start: 2021-10-11 | End: 2021-10-15 | Stop reason: SDUPTHER

## 2021-10-12 ENCOUNTER — SOCIAL WORK (OUTPATIENT)
Dept: BEHAVIORAL/MENTAL HEALTH CLINIC | Facility: CLINIC | Age: 56
End: 2021-10-12

## 2021-10-12 DIAGNOSIS — F33.41 MAJOR DEPRESSIVE DISORDER, RECURRENT EPISODE, IN PARTIAL REMISSION (HCC): Primary | ICD-10-CM

## 2021-10-12 DIAGNOSIS — F41.1 GAD (GENERALIZED ANXIETY DISORDER): ICD-10-CM

## 2021-10-15 ENCOUNTER — OFFICE VISIT (OUTPATIENT)
Dept: PSYCHIATRY | Facility: CLINIC | Age: 56
End: 2021-10-15
Payer: MEDICARE

## 2021-10-15 VITALS
BODY MASS INDEX: 42.09 KG/M2 | SYSTOLIC BLOOD PRESSURE: 152 MMHG | WEIGHT: 294 LBS | DIASTOLIC BLOOD PRESSURE: 79 MMHG | HEIGHT: 70 IN | HEART RATE: 73 BPM

## 2021-10-15 DIAGNOSIS — F41.1 GAD (GENERALIZED ANXIETY DISORDER): Chronic | ICD-10-CM

## 2021-10-15 DIAGNOSIS — G47.09 OTHER INSOMNIA: Chronic | ICD-10-CM

## 2021-10-15 DIAGNOSIS — F33.41 MAJOR DEPRESSIVE DISORDER, RECURRENT EPISODE, IN PARTIAL REMISSION (HCC): Primary | Chronic | ICD-10-CM

## 2021-10-15 PROCEDURE — 90833 PSYTX W PT W E/M 30 MIN: CPT | Performed by: PSYCHIATRY & NEUROLOGY

## 2021-10-15 PROCEDURE — 99214 OFFICE O/P EST MOD 30 MIN: CPT | Performed by: PSYCHIATRY & NEUROLOGY

## 2021-10-15 RX ORDER — ZOLPIDEM TARTRATE 10 MG/1
10 TABLET ORAL
Qty: 30 TABLET | Refills: 4 | Status: SHIPPED | OUTPATIENT
Start: 2021-10-16 | End: 2022-03-24 | Stop reason: SDUPTHER

## 2021-10-15 RX ORDER — VENLAFAXINE HYDROCHLORIDE 150 MG/1
150 CAPSULE, EXTENDED RELEASE ORAL DAILY
Qty: 30 CAPSULE | Refills: 4 | Status: SHIPPED | OUTPATIENT
Start: 2021-10-15 | End: 2022-04-21 | Stop reason: SDUPTHER

## 2021-10-15 RX ORDER — GABAPENTIN 300 MG/1
300 CAPSULE ORAL 3 TIMES DAILY
Qty: 90 CAPSULE | Refills: 4 | Status: SHIPPED | OUTPATIENT
Start: 2021-10-15 | End: 2022-05-12 | Stop reason: SDUPTHER

## 2021-10-20 ENCOUNTER — SOCIAL WORK (OUTPATIENT)
Dept: BEHAVIORAL/MENTAL HEALTH CLINIC | Facility: CLINIC | Age: 56
End: 2021-10-20

## 2021-10-20 DIAGNOSIS — F33.41 MAJOR DEPRESSIVE DISORDER, RECURRENT EPISODE, IN PARTIAL REMISSION (HCC): Primary | ICD-10-CM

## 2021-10-20 DIAGNOSIS — F41.1 GAD (GENERALIZED ANXIETY DISORDER): ICD-10-CM

## 2021-11-03 ENCOUNTER — SOCIAL WORK (OUTPATIENT)
Dept: BEHAVIORAL/MENTAL HEALTH CLINIC | Facility: CLINIC | Age: 56
End: 2021-11-03

## 2021-11-03 DIAGNOSIS — F33.41 MAJOR DEPRESSIVE DISORDER, RECURRENT EPISODE, IN PARTIAL REMISSION (HCC): Primary | ICD-10-CM

## 2021-11-03 DIAGNOSIS — F41.1 GAD (GENERALIZED ANXIETY DISORDER): ICD-10-CM

## 2021-11-17 ENCOUNTER — SOCIAL WORK (OUTPATIENT)
Dept: BEHAVIORAL/MENTAL HEALTH CLINIC | Facility: CLINIC | Age: 56
End: 2021-11-17

## 2021-11-17 DIAGNOSIS — F41.1 GAD (GENERALIZED ANXIETY DISORDER): ICD-10-CM

## 2021-11-17 DIAGNOSIS — F33.41 MAJOR DEPRESSIVE DISORDER, RECURRENT EPISODE, IN PARTIAL REMISSION (HCC): Primary | ICD-10-CM

## 2021-12-01 ENCOUNTER — SOCIAL WORK (OUTPATIENT)
Dept: BEHAVIORAL/MENTAL HEALTH CLINIC | Facility: CLINIC | Age: 56
End: 2021-12-01

## 2021-12-01 DIAGNOSIS — F33.41 MAJOR DEPRESSIVE DISORDER, RECURRENT EPISODE, IN PARTIAL REMISSION (HCC): Primary | ICD-10-CM

## 2021-12-01 DIAGNOSIS — F41.1 GAD (GENERALIZED ANXIETY DISORDER): ICD-10-CM

## 2022-01-25 ENCOUNTER — SOCIAL WORK (OUTPATIENT)
Dept: BEHAVIORAL/MENTAL HEALTH CLINIC | Facility: CLINIC | Age: 57
End: 2022-01-25

## 2022-01-25 DIAGNOSIS — F33.41 MAJOR DEPRESSIVE DISORDER, RECURRENT EPISODE, IN PARTIAL REMISSION (HCC): Primary | ICD-10-CM

## 2022-01-25 DIAGNOSIS — F41.1 GAD (GENERALIZED ANXIETY DISORDER): ICD-10-CM

## 2022-01-25 NOTE — BH TREATMENT PLAN
Александр Choi  [id number]       Date of Initial Treatment Plan: 8/4/2021  Date of Current Treatment Plan: 01/25/22    Treatment Plan Number 2    Strengths/Personal Resources for Self Care: Excellent relationship with his girlfriend, engaging in social activities with his parents, children and friends, continuing his interest in golf, baseball       Diagnosis:   1  Major depressive disorder, recurrent, moderate (HCC)            Area of Needs: Residual symptoms of depression that appear to be moderate, mild lethargy, previous thoughts of hopelessness and worthlessness, lower mood at times        Long Term Goal 1: Maintain or eliminate reduced symptoms of depression, currently 4-5/10 if 10 (1/25/2022) is "worst depression", bringing symptoms down to 1-2/10   "I think one of the biggest reasons for healing is to have closure of my issues with [employer] foods" "I'd like to heal naturally and eventually go off of my medications"     Target Date: 2/4/2022  Completion Date: N/A         Short Term Objectives for Goal 1: This writer will provide Artemio with PCT, SFBT, CBT and ET interventions through discussion about his history with Lisandro Soria and attempting to find closure, utilizing empathic listening, reframing, decatastrophizing, looking into any guilt/blame regarding incident, encouraging his strengths, validation of feelings  Building coping skills with this writer through CBT interventions, including various mindfulness practices  Explore concepts of esteem, trust and intimacy through psychoeducation and socratic dialogue   Artemio will enagage in any homework assigned, come to therapy sessions, discuss incident with Lisandro Soria openly to process and engage with his psychiatrist  Dionicia Cogan will provide medication management       GOAL 1: Modality: Individual 1x per month   Completion Date n/a, Medication Management and The person(s) responsible for carrying out the plan is  this Dennie Marsh and Dr Yue Aldana          Behavioral Health Treatment Plan [facility]: Diagnosis and Treatment Plan explained to Lorene Renner relates understanding diagnosis and is agreeable to Treatment Plan          Client Comments : Please share your thoughts, feelings, need and/or experiences regarding your treatment plan: He is in agreement with this treatment plan

## 2022-01-25 NOTE — PSYCH
Psychotherapy Provided: Individual Psychotherapy 45 minutes     Length of time in session: 45 minutes, follow up in 1 week    Encounter Diagnosis     ICD-10-CM    1  Major depressive disorder, recurrent episode, in partial remission ([facility] [address] )  F33 41    2  ALYSHA (generalized anxiety disorder)  F41 1        Goals addressed in session: Goal 1     Pain:      none    0    Current suicide risk : Low     Time: 10:00am - 10:45am  Previous session plan: Begin mindfulness work starting with deep breathing and grounding  Continue to explore his experiences with depression, how his self-esteem relates with depression  Confidence previously in sports, previous job - taken away   "Do you believe in affirmations?" Discussing how any day can be a good day depending on perspective  Continue discussion about his trust in others being damaged due to the safety issues, retaliation from [place], 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 Grief his brother passing away at 34 in motorcycle accident  D: He says he's been feeling "ok" lately, had a quite holiday  He says that he has been working on his weight  He reports he has bene having trouble with sleep due to his sleep apnea, some issues with his c-pap  Spent much of session updating treatment plan    This writer provided CBT through psychoeducation, updating goals, practicing of grounding mindfulness work with Verónica Chen in session  A: Zoë Villegas appeared to be in a euthymic mood with congruent affect, seemed to be experiencing lower mood at times, lethargy, mild thoughts of hopelessness, appeared to be kempt, no SI/HI nor CAVAZOS risk apparent or reported, seemed to respond well to CBT today, engaged in mindfulness work and his updating treatment plan     P: Check in with writing letter to [facility] and bringing it into next session  Check in with practicing grounding with deep breathing once per day, continue into cognitive defusion work, then progressive muscle relaxation, then consider pendulation through CBT interventions  Behavioral Health Treatment Plan [facility]: Diagnosis and Treatment Plan explained to Tabby Gregg relates understanding diagnosis and is agreeable to Treatment Plan   Yes

## 2022-02-16 ENCOUNTER — TELEPHONE (OUTPATIENT)
Dept: BEHAVIORAL/MENTAL HEALTH CLINIC | Facility: CLINIC | Age: 57
End: 2022-02-16

## 2022-03-14 ENCOUNTER — SOCIAL WORK (OUTPATIENT)
Dept: BEHAVIORAL/MENTAL HEALTH CLINIC | Facility: CLINIC | Age: 57
End: 2022-03-14

## 2022-03-14 DIAGNOSIS — F33.41 MAJOR DEPRESSIVE DISORDER, RECURRENT EPISODE, IN PARTIAL REMISSION (HCC): Primary | ICD-10-CM

## 2022-03-14 DIAGNOSIS — F41.1 GAD (GENERALIZED ANXIETY DISORDER): ICD-10-CM

## 2022-03-14 NOTE — PSYCH
Psychotherapy Provided: Individual Psychotherapy 45 minutes     Length of time in session: 52 minutes, follow up in 1 week    Encounter Diagnosis     ICD-10-CM    1  Major depressive disorder, recurrent episode, in partial remission ([facility] [address] )  F33 41    2  ALYSHA (generalized anxiety disorder)  F41 1        Goals addressed in session: Goal 1     Pain:      none    0    Current suicide risk : Low     Time: 1:01pm  - 1:53pm   Previous session plan: Check in with writing letter to [facility] and bringing it into next session  Check in with practicing grounding with deep breathing once per day, continue into cognitive defusion work, then progressive muscle relaxation, then consider pendulation through CBT interventions  D: Discussed how the strength of his girlfriend's mother helps him through is days, by relating to her and seeing her powerful rowena in action  Did not bring in his [address] letter  He says he's dealt with an issue with his car recently that has been bothering him, adding a financial stressor to his life  He reports he does feel worse at times due to loss of job and financial freedom  He says mindfulness techniques have been useful to help him with reducing his anxiety  This writer provided CBT through mindfulness work, PCT through empathic listening, validation of feelings, reflection of content and feelings  A: Anahi Wilkins appeared to be in a mildly euthymic mood with congruent affect, seemed to be experiencing lower mood at times, lower self-esteem, sleep disturbances, appeared to be kempt, no SI/HI nor CAVAZOS risk apparent or reported, seemed to respond well to PCT today, along with CBT through his practice  P: Feels like a failure due to loss of his career, lawsuit - depressed about his future and is considering what he would like to do next   Consider vocational assistance referral  Check in with practicing grounding with deep breathing once per day, continue into cognitive defusion work, then progressive muscle relaxation, then consider pendulation through CBT interventions  Behavioral Health Treatment Plan [facility]: Diagnosis and Treatment Plan explained to Miguel A Wilson relates understanding diagnosis and is agreeable to Treatment Plan   Yes

## 2022-03-24 ENCOUNTER — TELEPHONE (OUTPATIENT)
Dept: PSYCHIATRY | Facility: CLINIC | Age: 57
End: 2022-03-24

## 2022-03-24 DIAGNOSIS — G47.09 OTHER INSOMNIA: Primary | Chronic | ICD-10-CM

## 2022-03-24 RX ORDER — ZOLPIDEM TARTRATE 10 MG/1
10 TABLET ORAL
Qty: 30 TABLET | Refills: 1 | Status: SHIPPED | OUTPATIENT
Start: 2022-03-24 | End: 2022-05-12 | Stop reason: SDUPTHER

## 2022-03-24 NOTE — TELEPHONE ENCOUNTER
Patient is calling bc he said that he is due for his zolpidem 10 mg  It is still not at the pharmacy, [facility] on [facility]  He said that he called earlier and spoke with someone in the office and was told they would be in late tonight   This is supposed to come from Dr Mimi Cervantes

## 2022-03-25 NOTE — TELEPHONE ENCOUNTER
Left a  for Austin Guillen informing him that Dr Ani Weiner sent the Zolpidem 10 mg prescription to the [facility]      [facility]

## 2022-03-29 ENCOUNTER — SOCIAL WORK (OUTPATIENT)
Dept: BEHAVIORAL/MENTAL HEALTH CLINIC | Facility: CLINIC | Age: 57
End: 2022-03-29

## 2022-03-29 DIAGNOSIS — F41.1 GAD (GENERALIZED ANXIETY DISORDER): ICD-10-CM

## 2022-03-29 DIAGNOSIS — F33.41 MAJOR DEPRESSIVE DISORDER, RECURRENT EPISODE, IN PARTIAL REMISSION (HCC): Primary | ICD-10-CM

## 2022-03-29 NOTE — PSYCH
Psychotherapy Provided: Individual Psychotherapy 45 minutes     Length of time in session: 47 minutes, follow up in 1 week    Encounter Diagnosis     ICD-10-CM    1  Major depressive [facility], recurrent episode, [facility]  F33 41    2  ALYSHA (generalized anxiety disorder)  F41 1        Goals addressed in session: Goal 1     Pain:      none    0    Current suicide risk : Low     Time: 10:03am - 10:50am  Previous session plan: Feels like a failure due to loss of his career, lawsuit - depressed about his future and is considering what he would like to do next  Consider vocational assistance referral  Check in with practicing grounding with deep breathing once per day, continue into cognitive defusion work, then progressive muscle relaxation, then consider pendulation through CBT interventions    D: Practiced cognitive defusion with Lucia Diamond today, assisted with guided imagery and utilized wave sounds  Discussed this for some time  Sent him questions to ask himself before practicing cognitive defusion  Says he's planning on going camping again this summer  Says he's generally feeling well  Reports mindfulness, grounding and deep breathing have been helpful in improving mood, reducing symptoms of anxiety  This writer provided PCT through empathic listening, validation of feelings, CBT through psychoeducation, mindfulness work and practice  A: Bo Hoff appeared to be in a euthymic mood with congruent affect, seemed to be experiencing lower mood at times, excessive worry, appeared to be kempt, no SI/HI nor CAVAZOS risk apparent or reported, seemed to respond well to PCT, CBT work  P: Check in with Lucia Diamond practicing cognitive defusion using [employer] waves, using the prompting questions emailed to him regarding standing on the beach, using grounding and deep breathing  Feels like a failure due to loss of his career, lawsuit - depressed about his future and is considering what he would like to do next  Consider vocational assistance referral       [address]: Diagnosis and Treatment Plan explained to Mancandy Shoulder relates understanding diagnosis and is agreeable to Treatment Plan   Yes

## 2022-04-12 ENCOUNTER — SOCIAL WORK (OUTPATIENT)
Dept: BEHAVIORAL/MENTAL HEALTH CLINIC | Facility: CLINIC | Age: 57
End: 2022-04-12

## 2022-04-12 DIAGNOSIS — F41.1 GAD (GENERALIZED ANXIETY DISORDER): Primary | ICD-10-CM

## 2022-04-12 DIAGNOSIS — F33.41 MAJOR DEPRESSIVE DISORDER, RECURRENT EPISODE, IN PARTIAL REMISSION (HCC): ICD-10-CM

## 2022-04-12 NOTE — PSYCH
Psychotherapy Provided: Individual Psychotherapy 45 minutes     Length of time in session: 46 minutes, follow up in 1 week    Encounter Diagnosis     ICD-10-CM    1  ALYSHA (generalized anxiety disorder)  F41 1    2  Major depressive [facility], recurrent episode, [facility]  F33 41        Goals addressed in session: Goal 1     Pain:      none    0    Current suicide risk : Low     Time: 10:00am - 10:46am  Previous session plan: Check in with Pema Masterson practicing cognitive defusion using youtube waves, using the prompting questions emailed to him regarding standing on the beach, using grounding and deep breathing  Feels like a failure due to loss of his career, lawsuit - depressed about his future and is considering what he would like to do next  Consider vocational assistance referral   D: Discussed his issue with sleep apnea and his machine briefly  Explored his options for his vocation  Discussed his mood and experiences with cognitive defusion  He reports that he does not need vocational assistance at this time  This writer provided PCT through empathic listening, validation of feelings, reflection of content and feelings, CBT through review of his cognitive defusion practice  A: Jeremy Simons appeared to be in a neutral mood with congruent affect, seemed to be experiencing lower mood, lethargy, mild insomnia, appeared to be kempt, no SI/HI nor CAVAZOS risk apparent or reported, seemed to respond well to PCT, CBT work  P: Check in with Pema Masterson practicing cognitive defusion using youtube waves, using the prompting questions emailed to him regarding standing on the beach, using grounding and deep breathing  Feels like a failure due to loss of his career, lawsuit - depressed about his future and is considering what he would like to do next   Consider vocational assistance referral       [address]: Diagnosis and Treatment Plan explained to Nahomy Booth relates understanding diagnosis and is agreeable to Treatment Plan   Yes

## 2022-04-21 DIAGNOSIS — F33.41 MAJOR DEPRESSIVE DISORDER, RECURRENT EPISODE, IN PARTIAL REMISSION (HCC): Primary | Chronic | ICD-10-CM

## 2022-04-21 DIAGNOSIS — F41.1 GAD (GENERALIZED ANXIETY DISORDER): Chronic | ICD-10-CM

## 2022-04-21 RX ORDER — VENLAFAXINE HYDROCHLORIDE 150 MG/1
150 CAPSULE, EXTENDED RELEASE ORAL DAILY
Qty: 30 CAPSULE | Refills: 0 | Status: SHIPPED | OUTPATIENT
Start: 2022-04-21 | End: 2022-05-12 | Stop reason: SDUPTHER

## 2022-05-03 ENCOUNTER — SOCIAL WORK (OUTPATIENT)
Dept: BEHAVIORAL/MENTAL HEALTH CLINIC | Facility: CLINIC | Age: 57
End: 2022-05-03
Payer: MEDICARE

## 2022-05-03 DIAGNOSIS — F33.41 MAJOR DEPRESSIVE DISORDER, RECURRENT EPISODE, IN PARTIAL REMISSION (HCC): ICD-10-CM

## 2022-05-03 DIAGNOSIS — F41.1 GAD (GENERALIZED ANXIETY DISORDER): Primary | ICD-10-CM

## 2022-05-03 PROCEDURE — 90834 PSYTX W PT 45 MINUTES: CPT | Performed by: COUNSELOR

## 2022-05-03 NOTE — PSYCH
Psychotherapy Provided: Individual Psychotherapy 45 minutes     Length of time in session: 45 minutes, follow up in 1 week    No diagnosis found  Goals addressed in session: Goal 1     Pain:      none    0    Current suicide risk : Low     Time: 11:01am - 11:46am  Previous session plan: Check in with Nazario Tejada practicing cognitive defusion using youtube waves, using the prompting questions emailed to him regarding standing on the beach, using grounding and deep breathing  Feels like a failure due to loss of his career, lawsuit - depressed about his future and is considering what he would like to do next  Consider vocational assistance referral   D: He reports he was in [place] helping his girlfriend with her mother who was just let out of the hospital  He says that he hasn't been working much  Discussed his challenges with his sleep, discussed sleep hygiene  CBT through psychoeducation  Reports he has been utilizing putting earmuffs on to help him with visualization  He says the cognitive defusion helps him get back to sleep, helps him to fall asleep, helps with relaxation  This writer provided PCT through empathic listening, validation of feelings, reflection of content and feelings  A: Sharita Whitney appeared to be in a neutral mood with congruent affect, seemed to be experiencing lower mood at times, lethargy, mild avolition and anhedonia, appeared to be kempt, no SI/HI nor CAVAZOS risk apparent or reported, seemed to respond well to PCT and CBT today  P: Feels like a failure due to loss of his career, lawsuit - depressed about his future and is considering what he would like to do next, consider stuck points  Reports he has been utilizing putting earmuffs on to help him with visualization  CONTINUED: Check in with Nazario Tejada practicing cognitive defusion using youtube waves, using the prompting questions emailed to him regarding standing on the beach, using grounding and deep breathing   Consider vocational assistance referral       [address]: Diagnosis and Treatment Plan explained to Kristen Mejia relates understanding diagnosis and is agreeable to Treatment Plan   Yes

## 2022-05-06 NOTE — PSYCH
Virtual Regular Visit    Verification of patient location:    Patient is located in the following state in which I hold an active license [place]    Assessment/Plan:    Problem List Items Addressed This Visit        Other    Major depressive disorder, recurrent episode, in partial remission ([facility] [address] ) - Primary (Chronic)    Relevant Medications    venlafaxine (EFFEXOR-XR) 150 mg 24 hr capsule    zolpidem (AMBIEN) 10 mg tablet (Start on 5/23/2022)    ALYSHA (generalized anxiety disorder) (Chronic)    Relevant Medications    gabapentin (NEURONTIN) 300 mg capsule    venlafaxine (EFFEXOR-XR) 150 mg 24 hr capsule    zolpidem (AMBIEN) 10 mg tablet (Start on 5/23/2022)    Other insomnia (Chronic)    Relevant Medications    zolpidem (AMBIEN) 10 mg tablet (Start on 5/23/2022)          Reason for visit is   Chief Complaint   Patient presents with    Medication Management    Follow-up    Virtual Regular Visit        Encounter provider Lexi Zhao MD    Provider located at [address] [place] [id number] [phone]    Recent Visits  No visits were found meeting these conditions  Showing recent visits within past 7 days and meeting all other requirements  Today's Visits  Date Type Provider Dept   05/12/22 [facility] today's visits and meeting all other requirements  Future Appointments  No visits were found meeting these conditions  Showing future appointments within next 150 days and meeting all other requirements       The patient was identified by name and date of birth  Srinivas Oh was informed that this is a telemedicine visit and that the visit is being conducted through [facility] and patient was informed this is a secure, HIPAA-complaint platform  He agrees to proceed     My office door was closed  No one else was in the room    He acknowledged consent and understanding of privacy and security of the video platform  The patient has agreed to participate and understands they can discontinue the visit at any time  Patient is aware this is a billable service  SUBJECTIVE:    Meryle Erie is seen today for a follow up for Major Depressive Disorder, Generalized Anxiety Disorder and insomnia  He has done fairly well since the last visit  He states that mood continues to be stable, has only mild depressive symptoms occasionally  He reports some anxiety related to recent health issues and family issues  He states that he was just diagnosed with COVID 2 days ago and is currently on quarantine  Also he worries about both of his parents who also have COVID - mother was just hospitalized yesterday "she sounded better today"    He denies any suicidal ideation, intent or plan at present; denies any homicidal ideation, intent or plan at present  He has no auditory hallucinations, denies any visual hallucinations, has no delusional thinking  He denies any side effects from current psychiatric medications  HPI ROS Appetite Changes and Sleep:     He reports fluctuating sleep pattern, decrease in number of sleep hours (6 hours), normal appetite, recent weight gain (3 lbs), low energy    Current Rating Scores:     Current PHQ-9   PHQ-2/9 Depression Screening    Little interest or pleasure in doing things: 1 - several days  Feeling down, depressed, or hopeless: 2 - more than half the days  Trouble falling or staying asleep, or sleeping too much: 1 - several days  Feeling tired or having little energy: 2 - more than half the days  Poor appetite or overeatin - several days  Feeling bad about yourself - or that you are a failure or have let yourself or your family down: 1 - several days  Trouble concentrating on things, such as reading the newspaper or watching television: 0 - not at all  Moving or speaking so slowly that other people could have noticed   Or the opposite - being so fidgety or restless that you have been moving around a lot more than usual: 0 - not at all  Thoughts that you would be better off dead, or of hurting yourself in some way: 0 - not at all  PHQ-9 Score: 8   PHQ-9 Interpretation: Mild depression        Current PHQ-9 score is decreased from 12 at the last visit)  Review Of Systems:      Constitutional low energy and recent weight gain (3 lbs)   ENT nasal congestion   Cardiovascular negative   Respiratory cough   Gastrointestinal negative   Genitourinary negative   Musculoskeletal muscle aches and joint pain   Integumentary negative   Neurological negative   Endocrine negative   Other Symptoms none, all other systems are negative       Past Psychiatric History: (unchanged information from previous note copied and updated)    Past Inpatient Psychiatric Treatment:   No history of past inpatient psychiatric admissions  Past Outpatient Psychiatric Treatment:    In outpatient treatment at [address] [place] [address] E since 2/2017    Past Suicide Attempts: no  Past Violent Behavior: no  Past Psychiatric Medication Trials: Lexapro, Zoloft, Wellbutrin XL, Trazodone, Abilify, Ambien, Cymbalta, Buspar and Melatonin    Traumatic History: (unchanged information from previous note copied and updated)    Abuse: no history of sexual abuse, no history of physical abuse  Other Traumatic Events: none     Past Medical History:    Past Medical History:   Diagnosis Date    Adrenal adenoma     Arthritis     Carpal tunnel syndrome     Chronic back pain     Chronic pain disorder     COVID     CPAP (continuous positive airway pressure) dependence     Cubital tunnel syndrome     Depression     Fatigue     Fatty liver     Folliculitis     Hemorrhoid     Liver lesion     Lumbago     Morbid obesity (HCC)     Myofascial pain syndrome     Neuropathy     Scalp lesion     Sleep apnea     CPAP dependance    Sleep difficulties     Thickening of wall of gallbladder     Tinea capitis     Ulnar 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     Umbilical hernia     Vitamin D deficiency      Past Medical History Pertinent Negatives:   Diagnosis Date Noted    Head injury     Seizures ([address] )      Past Surgical History:   Procedure Laterality Date    CARPAL TUNNEL RELEASE Bilateral     CHOLECYSTECTOMY      COLONOSCOPY      CYST REMOVAL      Scalp    EGD      EYE MUSCLE SURGERY Bilateral     as a child for amblyopia    HAND SURGERY      HEMORROIDECTOMY      HERNIA REPAIR      KNEE ARTHROSCOPY Right     PATELLA SURGERY Right     scraped out scar tissue per pt     HI COLONOSCOPY FLX DX W/COLLJ SPEC WHEN PFRMD N/A 2/10/2016    Procedure: COLONOSCOPY;  Surgeon: Nidia Perez MD;  [facility]; Service: Colorectal    HI EGD TRANSORAL BIOPSY SINGLE/MULTIPLE N/A 8/23/2017    Procedure: ESOPHAGOGASTRODUODENOSCOPY (EGD) with bx;  Surgeon: Randall Carlson MD;  [facility];   Service: Bariatrics    HI EXC SKIN BENIG 0 6-1 CM REMAINDR BODY N/A 12/8/2016    Procedure: EXCISION OF SCALP LESION;  Surgeon: Jacqueline Marquez MD;  [facility];  Service: General    HI HEMORRHOIDECTOMY,INT/EXT, 2+ COLUMNS/GROUPS N/A 2/12/2016    Procedure: HEMORRHOIDECTOMY EXCISION, EUA, Anal fistulotomy;  Surgeon: Nidia Perez MD;  [facility];  Service: Colorectal    HI LAP,CHOLECYSTECTOMY N/A 3/11/2021    Procedure: CHOLECYSTECTOMY LAPAROSCOPIC;  Surgeon: lEin Coreas MD;  Location: [facility];  Service: General    HI REPAIR UMBILICAL NDZG,2+X/A,PAGOT N/A 3/11/2021    Procedure: REPAIR HERNIA UMBILICAL RECURRENT;  Surgeon: Elin Coreas MD;  [facility];  Service: General    UMBILICAL HERNIA REPAIR       Allergies   Allergen Reactions    Penicillins Rash       Substance Abuse History:    Social History     Substance and Sexual Activity   Alcohol Use Not Currently    Alcohol/week: 4 0 standard drinks    Types: 4 Cans of beer per week    Comment: Social alcohol use Social History     Substance and Sexual Activity   Drug Use No       Social History:    Social History     Socioeconomic History    Marital status: Legally      Spouse name: Not on file    Number of children: 1    Years of education: some college    Highest education level: Some college, no degree   Occupational History    Occupation: on disability, works part time in food delivery   Tobacco Use    Smoking status: Never Smoker    Smokeless tobacco: Never Used   Vaping Use    Vaping Use: Never used   Substance and Sexual Activity    Alcohol use: Not Currently     Alcohol/week: 4 0 standard drinks     Types: 4 Cans of beer per week     Comment: Social alcohol use    Drug use: No    Sexual activity: Yes     Partners: Female     Birth control/protection: Condom Male   Other Topics Concern    Not on file   Social History Narrative    Education: some college    Learning Disabilities: none    Marital History:     Children: 1 adult son    Living Arrangement: lives in home with parents    Occupational History: on disability, employed part time in food delivery, 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    [facility]: good support system    Legal History: none     History: None        Most recent tobacco use screenin2018     Social Determinants of Health     Financial Resource Strain: Low Risk     Difficulty of Paying Living Expenses: Not hard at all   Food Insecurity: No Food Insecurity    Worried About Running Out of Food in the Last Year: Never true    [address] in the Last Year: Never true   Transportation Needs: No Transportation Needs    Lack of Transportation (Medical): No    Lack of Transportation (Non-Medical): No   Physical Activity: Insufficiently Active    Days of Exercise per Week: 1 day    Minutes of Exercise per Session: 60 min   Stress: Stress Concern Present    Feeling of Stress :  To some extent   Social Connections: Socially Isolated    Frequency of Communication with Friends and Family: More than three times a week    Frequency of Social Gatherings with Friends and Family: Twice a week    Attends Nondenominational Services: Never    Active Member of Clubs or Organizations: No    Attends Club or Organization Meetings: Never    Marital Status:    Intimate Partner Violence: Not At Risk    Fear of Current or Ex-Partner: No    Emotionally Abused: No    Physically Abused: No    Sexually Abused: No   Housing Stability: [address] Unable to Pay for Housing in the Last Year: No    Number of [place] in the Last Year: 1    Unstable Housing in the Last Year: No       Family Psychiatric History:     Family History   Problem Relation Age of Onset    Cancer Mother     Diabetes type II Father     Cancer Father     Depression Sister     Suicide Attempts Sister     No Known Problems Sister     Substance Abuse Neg Hx        History Review:  The following portions of the patient's history were reviewed and updated as appropriate: allergies, current medications, past family history, past medical history, past social history, past surgical history and problem list          OBJECTIVE:     Vital signs in last 24 hours:    Vitals:    05/12/22 1403   Weight: 135 kg (297 lb)   Height: 5' 10" (1 778 m)       Mental Status Evaluation:    Appearance age appropriate, casually dressed   Behavior cooperative, mildly anxious   Speech normal rate, normal volume, normal pitch   Mood mildly anxious   Affect normal range and intensity, appropriate   Thought Processes organized, goal directed   Associations intact associations   Thought Content no overt delusions   Perceptual Disturbances: no auditory hallucinations, no visual hallucinations   Abnormal Thoughts  Risk Potential Suicidal ideation - None  Homicidal ideation - None  Potential for aggression - No   Orientation oriented to person, place, time/date and situation   Memory recent and remote memory grossly intact   Consciousness alert and awake   Attention Span Concentration Span attention span and concentration are age appropriate   Intellect appears to be of average intelligence   Insight intact   Judgement intact   Muscle Strength and  Gait normal muscle strength and normal muscle tone, normal gait and normal balance   Motor activity no abnormal movements   Language no difficulty naming common objects, no difficulty repeating a phrase, no difficulty writing a sentence   Fund of Knowledge adequate knowledge of current events  adequate fund of knowledge regarding past history  adequate fund of knowledge regarding vocabulary    Pain moderate   Pain Scale 5       Laboratory Results: I have personally reviewed all pertinent laboratory/tests results    Recent Labs (last 6 months):   No visits with results within 6 Month(s) from this visit  Latest known visit with results is:   Appointment on 05/27/2021   Component Date Value    Hemoglobin A1C 05/27/2021 5 6     EAG 05/27/2021 114     Cholesterol 05/27/2021 144     Triglycerides 05/27/2021 89     HDL, Direct 05/27/2021 34 (A)    LDL Calculated 05/27/2021 92     Non-HDL-Chol (CHOL-HDL) 05/27/2021 110     PSA 05/27/2021 2 1     Glucose, Fasting 05/27/2021 109 (A)    Vit D, 25-Hydroxy 05/27/2021 32 8        Suicide/Homicide Risk Assessment:    Risk of Harm to Self:  Demographic risk factors include: , age: over 48 or older  Historical Risk Factors include: history of depression, history of anxiety  Recent Specific Risk Factors include: diagnosis of depression, current anxiety symptoms  Protective Factors: no current suicidal ideation, compliant with medications, compliant with mental health treatment, responsibilities and duties to others, stable living environment, supportive family  Weapons: gun   The following steps have been taken to ensure weapons are properly secured: locked  Based on today's assessment, Niki Matta presents the following risk of harm to self: low    Risk of Harm to Others: The following ratings are based on assessment at the time of the interview  Based on today's assessment, Niki Carrillozuri presents the following risk of harm to others: none    The following interventions are recommended: no intervention changes needed    Assessment/Plan:       Diagnoses and all orders for this visit:    Major depressive disorder, recurrent episode, in partial remission (HCC)  -     venlafaxine (EFFEXOR-XR) 150 mg 24 hr capsule; Take 1 capsule (150 mg total) by mouth in the morning  ALYSHA (generalized anxiety disorder)  -     gabapentin (NEURONTIN) 300 mg capsule; Take 1 capsule (300 mg total) by mouth in the morning and 1 capsule (300 mg total) in the evening and 1 capsule (300 mg total) before bedtime   -     venlafaxine (EFFEXOR-XR) 150 mg 24 hr capsule; Take 1 capsule (150 mg total) by mouth in the morning  Other insomnia  -     zolpidem (AMBIEN) 10 mg tablet;  Take 1 tablet (10 mg total) by mouth daily at bedtime as needed for sleep To be filled on or after 5/23/22          Treatment Recommendations/Precautions:    Continue Effexor  mg daily to improve depressive symptoms  Continue Neurontin 300 mg three times a day to improve anxiety symptoms  Continue Ambien 10 mg at bedtime as needed to help with insomnia  Medication management every 4 months  Continue psychotherapy with [facility] therapist [address] [place] [address] with family physician for yearly physical exam, arthritis, chronic pain, gastrointestinal issues and sleep apnea  Aware of 24 hour and weekend coverage for urgent situations accessed by calling [facility] main practice number    Medications Risks/Benefits      Risks, Benefits And Possible Side Effects Of Medications:    Risks, benefits, and possible side effects of medications explained to Niki Matta including risk of suicidality and serotonin syndrome related to treatment with antidepressants and risk of impaired next-day mental alertness, complex sleep-related behavior and dependence related to treatment with hypnotic medications  He verbalizes understanding and agreement for treatment  Controlled Medication Discussion:     Rabia Mendez has been filling controlled prescriptions on time as prescribed according to [facility] Monitoring Program    Psychotherapy Provided:     Individual psychotherapy provided: Yes  Counseling was provided during the session today for 16 minutes  Medications, treatment progress and treatment plan reviewed with Rabia Mendez  Goals discussed during in session: improve control of anxiety, maintain stability of depression and help with sleep  Discussed with Rabia Mendez coping with medical problems, chronic pain and occasional anxiety  Coping techniques including exercising and talking to a therapist reviewed with Rabia Mendez  Supportive therapy provided  Treatment Plan:    Completed and signed during the session: Not applicable - Treatment Plan to be completed by [address] [address] therapist    Note Share: This note was shared with patient  I spent 30 minutes with patient today in which greater than 50% of the time was spent in counseling/coordination of care regarding coping with recent medical issues    VIRTUAL VISIT [id number] [facility] verbally agrees to participate in [employer]  Pt is aware that [employer] could be limited without vital signs or the ability to perform a full hands-on physical exam  Eldon Alannahs understands he or the provider may request at any time to terminate the video visit and request the patient to seek care or treatment in person       Sarmad Johnson MD 05/12/22

## 2022-05-12 ENCOUNTER — TELEMEDICINE (OUTPATIENT)
Dept: PSYCHIATRY | Facility: CLINIC | Age: 57
End: 2022-05-12
Payer: MEDICARE

## 2022-05-12 VITALS — HEIGHT: 70 IN | WEIGHT: 297 LBS | BODY MASS INDEX: 42.52 KG/M2

## 2022-05-12 DIAGNOSIS — F41.1 GAD (GENERALIZED ANXIETY DISORDER): Chronic | ICD-10-CM

## 2022-05-12 DIAGNOSIS — F33.41 MAJOR DEPRESSIVE DISORDER, RECURRENT EPISODE, IN PARTIAL REMISSION (HCC): Primary | Chronic | ICD-10-CM

## 2022-05-12 DIAGNOSIS — G47.09 OTHER INSOMNIA: Chronic | ICD-10-CM

## 2022-05-12 PROCEDURE — 90833 PSYTX W PT W E/M 30 MIN: CPT | Performed by: PSYCHIATRY & NEUROLOGY

## 2022-05-12 PROCEDURE — 99214 OFFICE O/P EST MOD 30 MIN: CPT | Performed by: PSYCHIATRY & NEUROLOGY

## 2022-05-12 RX ORDER — ZOLPIDEM TARTRATE 10 MG/1
10 TABLET ORAL
Qty: 30 TABLET | Refills: 4 | Status: SHIPPED | OUTPATIENT
Start: 2022-05-23 | End: 2022-10-20

## 2022-05-12 RX ORDER — VENLAFAXINE HYDROCHLORIDE 150 MG/1
150 CAPSULE, EXTENDED RELEASE ORAL DAILY
Qty: 30 CAPSULE | Refills: 4 | Status: SHIPPED | OUTPATIENT
Start: 2022-05-12 | End: 2022-10-09

## 2022-05-12 RX ORDER — GABAPENTIN 300 MG/1
300 CAPSULE ORAL 3 TIMES DAILY
Qty: 90 CAPSULE | Refills: 4 | Status: SHIPPED | OUTPATIENT
Start: 2022-05-12 | End: 2022-10-09

## 2022-05-20 ENCOUNTER — TELEPHONE (OUTPATIENT)
Dept: BEHAVIORAL/MENTAL HEALTH CLINIC | Facility: CLINIC | Age: 57
End: 2022-05-20

## 2022-05-20 NOTE — TELEPHONE ENCOUNTER
Call patients to confirmed his appt for 5/24/22  He requested to change in office appt  to virtual my chart because he currently have Covid

## 2022-06-08 ENCOUNTER — TELEPHONE (OUTPATIENT)
Dept: PSYCHIATRY | Facility: CLINIC | Age: 57
End: 2022-06-08

## 2022-06-08 NOTE — TELEPHONE ENCOUNTER
Called Nazario Tejada due to not having a follow up appointment with this writer  Left a message with him around 10:25am to return phone call

## 2022-06-23 ENCOUNTER — OFFICE VISIT (OUTPATIENT)
Dept: FAMILY MEDICINE CLINIC | Facility: CLINIC | Age: 57
End: 2022-06-23
Payer: MEDICARE

## 2022-06-23 VITALS
BODY MASS INDEX: 43.95 KG/M2 | WEIGHT: 307 LBS | RESPIRATION RATE: 16 BRPM | HEART RATE: 80 BPM | HEIGHT: 70 IN | OXYGEN SATURATION: 98 % | DIASTOLIC BLOOD PRESSURE: 76 MMHG | SYSTOLIC BLOOD PRESSURE: 120 MMHG | TEMPERATURE: 97.5 F

## 2022-06-23 DIAGNOSIS — R43.9 DYSOSMIA: ICD-10-CM

## 2022-06-23 DIAGNOSIS — Z11.59 NEED FOR HEPATITIS C SCREENING TEST: Primary | ICD-10-CM

## 2022-06-23 DIAGNOSIS — E66.01 MORBID OBESITY (HCC): ICD-10-CM

## 2022-06-23 DIAGNOSIS — Z13.1 SCREENING FOR DIABETES MELLITUS: ICD-10-CM

## 2022-06-23 DIAGNOSIS — Z13.6 SCREENING FOR CARDIOVASCULAR CONDITION: ICD-10-CM

## 2022-06-23 DIAGNOSIS — M79.10 MYALGIA DUE TO VIRAL INFECTION: ICD-10-CM

## 2022-06-23 DIAGNOSIS — Z12.5 PROSTATE CANCER SCREENING: ICD-10-CM

## 2022-06-23 DIAGNOSIS — Z11.4 SCREENING FOR HIV (HUMAN IMMUNODEFICIENCY VIRUS): ICD-10-CM

## 2022-06-23 DIAGNOSIS — B97.89 MYALGIA DUE TO VIRAL INFECTION: ICD-10-CM

## 2022-06-23 DIAGNOSIS — R43.2 DYSGEUSIA: ICD-10-CM

## 2022-06-23 DIAGNOSIS — Z00.00 MEDICARE ANNUAL WELLNESS VISIT, SUBSEQUENT: ICD-10-CM

## 2022-06-23 DIAGNOSIS — F33.41 MAJOR DEPRESSIVE DISORDER, RECURRENT EPISODE, IN PARTIAL REMISSION (HCC): ICD-10-CM

## 2022-06-23 PROCEDURE — G0439 PPPS, SUBSEQ VISIT: HCPCS | Performed by: FAMILY MEDICINE

## 2022-06-23 PROCEDURE — 99214 OFFICE O/P EST MOD 30 MIN: CPT | Performed by: FAMILY MEDICINE

## 2022-06-23 RX ORDER — BUSPIRONE HYDROCHLORIDE 5 MG/1
5 TABLET ORAL 2 TIMES DAILY
Qty: 60 TABLET | Refills: 0 | Status: SHIPPED | OUTPATIENT
Start: 2022-06-23

## 2022-06-23 RX ORDER — TIZANIDINE 2 MG/1
2 TABLET ORAL EVERY 8 HOURS PRN
Qty: 20 TABLET | Refills: 0 | Status: SHIPPED | OUTPATIENT
Start: 2022-06-23

## 2022-06-23 RX ORDER — MELOXICAM 15 MG/1
15 TABLET ORAL DAILY
Qty: 30 TABLET | Refills: 0 | Status: SHIPPED | OUTPATIENT
Start: 2022-06-23

## 2022-06-23 NOTE — PROGRESS NOTES
Assessment and Plan:     Problem List Items Addressed This Visit        Other    Major depressive disorder, recurrent episode, in partial remission (HCC) (Chronic)    Relevant Medications    busPIRone (BUSPAR) 5 mg tablet    Morbid obesity ([address] )    Medicare annual wellness visit, subsequent      Other Visit Diagnoses     Need for hepatitis C screening test    -  Primary    Relevant Orders    Hepatitis C Antibody ([facility])    Screening for HIV (human immunodeficiency virus)        Relevant Orders    HIV 1/2 Antigen/Antibody (4th Generation) w Reflex [facility]    Dysosmia        Relevant Medications    busPIRone (BUSPAR) 5 mg tablet    Dysgeusia        Relevant Medications    busPIRone (BUSPAR) 5 mg tablet    Myalgia due to viral infection        Relevant Medications    meloxicam (Mobic) 15 mg tablet    tiZANidine (ZANAFLEX) 2 mg tablet        Try Buspar given it has showed some benefit in other COVID-19 patients  For myalgias- Meloxicam for pain daily for 5 days and as needed thereafter, warned about gastritis, take it with food  Tizanidine prn for muscle spasm- discussed can make her sedated and avoid driving and operating heavy machinery for 2 hours after taking the medication or if she feels that her reflexes are impaired  Comprehensive PT might help  BMI Counseling: Body mass index is 44 05 kg/m²  The BMI is above normal  Nutrition recommendations include decreasing portion sizes, encouraging healthy choices of fruits and vegetables, decreasing fast food intake, consuming healthier snacks, limiting drinks that contain sugar, moderation in carbohydrate intake and reducing intake of cholesterol  Exercise recommendations include moderate physical activity 150 minutes/week  No pharmacotherapy was ordered  Rationale for BMI follow-up plan is due to patient being overweight or obese         Preventive health issues were discussed with patient, and age appropriate screening tests were ordered as noted in patient's After Visit Summary  Personalized health advice and appropriate referrals for health education or preventive services given if needed, as noted in patient's After Visit Summary  History of Present Illness:     Patient presents for a Medicare Wellness Visit    Patient was scheduled for [address] [place] [address] however patient did have EM problems that were addressed and is aware that they will get 2 separate bills, and is agreeable to conducting the visit  LOSS OF TAST AND SMEEL- STATES 1 year ago he had loss of taste and smell , prior to that he did have a viral illness however was never tested for COVID-19 until 8 months later when he had a negative COVID antibody test   His taste and smell returned after year however 6 weeks ago he was diagnosed with COVID-19 infection and he has lost his taste and smell again which returned after tooth 3 weeks but now he experiences altered sensations of taste and smell  He also reports myalgias all over his body  He reports fatigue  He does have chronic arthritis  Patient Care Team:  Mercy Thomas MD as PCP - MD Ayan Cuevas MD Lucius Mannan, DO Merna Other, MD Earlean Jakob, MD Sherry Major, MD Yossi Ang MD as Endoscopist     Review of Systems:     Review of Systems   Constitutional: Positive for fatigue  Negative for chills and fever  HENT: Negative for congestion, rhinorrhea and sore throat  Eyes: Negative for discharge, redness and itching  Respiratory: Negative for chest tightness, shortness of breath and wheezing  Cardiovascular: Negative for chest pain and palpitations  Gastrointestinal: Negative for abdominal pain, constipation and diarrhea  Genitourinary: Negative for dysuria  Musculoskeletal: Positive for arthralgias and myalgias  Skin: Negative for pallor and rash  Neurological: Negative for dizziness, weakness, numbness and headaches          Problem List:     Patient Active Problem List   Diagnosis    Major depressive disorder, recurrent episode, in partial remission ([facility] [address] )    ALYSHA (generalized anxiety disorder)    Other insomnia    Adrenal adenoma    Chronic right-sided thoracic back pain    Cubital tunnel syndrome of both upper extremities    CMC arthritis, thumb, degenerative    Intercostal neuralgia    Low back pain    Morbid obesity (HCC)    Myofascial pain syndrome    Sleep apnea    Thoracic compression fracture (HCC)    Tinea capitis    Ulnar 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 of left upper extremity    Vitamin D deficiency    Neuropathy    Tension headache    Prediabetes    Medicare annual wellness visit, subsequent    Skin lesion    Anosmia    Arthritis of right knee      Past Medical and Surgical History:     Past Medical History:   Diagnosis Date    Adrenal adenoma     Arthritis     Carpal tunnel syndrome     Chronic back pain     Chronic pain disorder     COVID     CPAP (continuous positive airway pressure) dependence     Cubital tunnel syndrome     Depression     Fatigue     Fatty liver     Folliculitis     Hemorrhoid     Liver lesion     Lumbago     Morbid obesity (HCC)     Myofascial pain syndrome     Neuropathy     Obesity 2015    Scalp lesion     Sleep apnea     CPAP dependance    Sleep difficulties     Thickening of wall of gallbladder     Tinea capitis     Ulnar 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     Umbilical hernia     Vitamin D deficiency      Past Surgical History:   Procedure Laterality Date    CARPAL TUNNEL RELEASE Bilateral     CHOLECYSTECTOMY      COLONOSCOPY      CYST REMOVAL      Scalp    EGD      EYE MUSCLE SURGERY Bilateral     as a child for amblyopia    HAND SURGERY      HEMORROIDECTOMY      HERNIA REPAIR      KNEE ARTHROSCOPY Right     PATELLA SURGERY Right     scraped out scar tissue per pt     MT COLONOSCOPY FLX DX W/COLLJ SPEC WHEN PFRMD N/A 2/10/2016    Procedure: COLONOSCOPY;  Surgeon: Derik Freed Lisa Shanks MD;  [facility]; Service: Colorectal    NC EGD TRANSORAL BIOPSY SINGLE/MULTIPLE N/A 8/23/2017    Procedure: ESOPHAGOGASTRODUODENOSCOPY (EGD) with bx;  Surgeon: Linnette Anne MD;  [facility];   Service: Bariatrics    NC EXC SKIN BENIG 0 6-1 CM REMAINDR BODY N/A 12/8/2016    Procedure: EXCISION OF SCALP LESION;  Surgeon: Kelly Dalton MD;  [facility];  Service: General    NC HEMORRHOIDECTOMY,INT/EXT, 2+ COLUMNS/GROUPS N/A 2/12/2016    Procedure: HEMORRHOIDECTOMY EXCISION, EUA, Anal fistulotomy;  Surgeon: Philippe Perez MD;  [facility];  Service: Colorectal    NC LAP,CHOLECYSTECTOMY N/A 3/11/2021    Procedure: Jelani Vance;  Surgeon: Manny Mcclure MD;  [facility];  Service: General    NC REPAIR UMBILICAL IIOX,8+H/F,XGLFX N/A 3/11/2021    Procedure: REPAIR HERNIA UMBILICAL RECURRENT;  Surgeon: Manny Mcclure MD;  [facility];  Service: General    UMBILICAL HERNIA REPAIR        Family History:     Family History   Problem Relation Age of Onset    Cancer Mother     Diabetes type II Father     Cancer Father     Depression Sister     Suicide Attempts Sister     No Known Problems Sister     Substance Abuse Neg Hx       Social History:     Social History     Socioeconomic History    Marital status: Legally      Spouse name: None    Number of children: 1    Years of education: some college    Highest education level: Some college, no degree   Occupational History    Occupation: on disability, works part time in food delivery   Tobacco Use    Smoking status: Never Smoker    Smokeless tobacco: Never Used   Vaping Use    Vaping Use: Never used   Substance and Sexual Activity    Alcohol use: Not Currently     Comment: Social alcohol use    Drug use: No    Sexual activity: Yes     Partners: Female     Birth control/protection: Condom Male   Other Topics Concern    None   Social History Narrative Education: some college    Learning Disabilities: none    Marital History:     Children: 1 adult son    Living Arrangement: lives in home with parents    Occupational History: on disability, employed part time in food delivery, 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    [address]: good support system    Legal History: none     History: None        Most recent tobacco use screenin2018     Social Determinants of Health     Financial Resource Strain: Low Risk     Difficulty of Paying Living Expenses: Not hard at all   Food Insecurity: No Food Insecurity    Worried About Running Out of Food in the Last Year: Never true    Artemio of Food in the Last Year: Never true   Transportation Needs: No Transportation Needs    Lack of Transportation (Medical): No    Lack of Transportation (Non-Medical): No   Physical Activity: Insufficiently Active    Days of Exercise per Week: 1 day    Minutes of Exercise per Session: 60 min   Stress: Stress Concern Present    Feeling of Stress :  To some extent   Social Connections: Socially Isolated    Frequency of Communication with Friends and Family: More than three times a week    Frequency of Social Gatherings with Friends and Family: Twice a week    Attends Pentec[employer]al Services: Never    Active Member of Clubs or Organizations: No    Attends Club or Organization Meetings: Never    Marital Status:    Intimate Partner Violence: Not At Risk    Fear of Current or Ex-Partner: No    Emotionally Abused: No    Physically Abused: No    Sexually Abused: No   Housing Stability: [address] Unable to Pay for Housing in the Last Year: No    Number of [place] in the Last Year: 1    Unstable Housing in the Last Year: No      Medications and Allergies:     Current Outpatient Medications   Medication Sig Dispense Refill    busPIRone (BUSPAR) 5 mg tablet Take 1 tablet (5 mg total) by mouth 2 (two) times a day 60 tablet 0    gabapentin (NEURONTIN) 300 mg capsule Take 1 capsule (300 mg total) by mouth in the morning and 1 capsule (300 mg total) in the evening and 1 capsule (300 mg total) before bedtime  90 capsule 4    meloxicam (Mobic) 15 mg tablet Take 1 tablet (15 mg total) by mouth daily With meals 30 tablet 0    tiZANidine (ZANAFLEX) 2 mg tablet Take 1 tablet (2 mg total) by mouth every 8 (eight) hours as needed for muscle spasms 20 tablet 0    venlafaxine (EFFEXOR-XR) 150 mg 24 hr capsule Take 1 capsule (150 mg total) by mouth in the morning  30 capsule 4    zolpidem (AMBIEN) 10 mg tablet Take 1 tablet (10 mg total) by mouth daily at bedtime as needed for sleep To be filled on or after 5/23/22 30 tablet 4     No current facility-administered medications for this visit  Allergies   Allergen Reactions    Penicillins Rash      Immunizations:     Immunization History   Administered Date(s) Administered    COVID-19 PFIZER VACCINE 0 3 ML IM 03/19/2021, 04/09/2021      Health Maintenance:         Topic Date Due    Hepatitis C Screening  Never done    HIV Screening  Never done    Colorectal Cancer Screening  11/07/2024         Topic Date Due    DTaP,Tdap,and Td Vaccines (1 - Tdap) Never done    COVID-19 Vaccine (3 - Booster for Pfizer series) 09/09/2021    Influenza Vaccine (Season Ended) 09/01/2022      Medicare Screening Tests and Risk Assessments:     Rosi Platt is here for his Subsequent Wellness visit  Last Medicare Wellness visit information reviewed, patient interviewed, no change since last AWV  Health Risk Assessment:   Patient rates overall health as poor  Patient feels that their physical health rating is much worse  Patient is dissatisfied with their life  Eyesight was rated as slightly worse  Hearing was rated as slightly worse  Patient feels that their emotional and mental health rating is slightly worse  Patients states they are never, rarely angry   Patient states they are always unusually tired/fatigued  Pain experienced in the last 7 days has been a lot  Patient's pain rating has been 9/10  Patient states that he has experienced no weight loss or gain in last 6 months  Not sure    Depression Screening:   PHQ-9 Score: 20      Fall Risk Screening: In the past year, patient has experienced: no history of falling in past year      Home Safety:  Patient has trouble with stairs inside or outside of their home  Patient has working smoke alarms and has working carbon monoxide detector  Home safety hazards include: none and loose rugs on the floor  Nutrition:   Current diet is Unhealthy  Medications:   Patient is not currently taking any over-the-counter supplements  Patient is able to manage medications  Activities of Daily Living (ADLs)/Instrumental Activities of Daily Living (IADLs):   Walk and transfer into and out of bed and chair?: Yes  Dress and groom yourself?: Yes    Bathe or shower yourself?: Yes    Feed yourself?  Yes  Do your laundry/housekeeping?: Yes  Manage your money, pay your bills and track your expenses?: Yes  Make your own meals?: Yes    Do your own shopping?: Yes    Durable Medical Equipment Suppliers  Dme    Previous Hospitalizations:   Any hospitalizations or ED visits within the last 12 months?: No      Advance Care Planning:   Living will: No    Durable POA for healthcare: No    Advanced directive: No    Advanced directive counseling given: No    Five wishes given: No    Patient declined ACP directive: No      PREVENTIVE SCREENINGS      Cardiovascular Screening:    General: Screening Current      Colorectal Cancer Screening:     General: Screening Current      Osteoporosis Screening:    General: Screening Not Indicated      Abdominal Aortic Aneurysm (AAA) Screening:    Risk factors include: tobacco use        General: Screening Not Indicated      Lung Cancer Screening:     General: Screening Not Indicated      Hepatitis C Screening:    General: Risks and Benefits Discussed    Hep C Screening Accepted: Yes      Screening, Brief Intervention, and Referral to Treatment (SBIRT)    Screening  Typical number of drinks in a day: 0  Typical number of drinks in a week: 0  Interpretation: Low risk drinking behavior  AUDIT-C Screenin) How often did you have a drink containing alcohol in the past year? 2 to 3 times a week  2) How many drinks did you have on a typical day when you were drinking in the past year? 1 to 2  3) How often did you have 6 or more drinks on one occasion in the past year? never    AUDIT-C Score: 3  Interpretation: Score 0-3 (male): Negative screen for alcohol misuse    Single Item Drug Screening:  How often have you used an illegal drug (including marijuana) or a prescription medication for non-medical reasons in the past year? never    Single Item Drug Screen Score: 0  Interpretation: Negative screen for possible drug use disorder    No exam data present     Physical Exam:     /76 (BP Location: Left arm, Patient Position: Sitting, Cuff Size: Large)   Pulse 80   Temp 97 5 °F (36 4 °C) (Temporal)   Resp 16   Ht 5' 10" (1 778 m)   Wt (!) 139 kg (307 lb)   SpO2 98%   BMI 44 05 kg/m²     Physical Exam  Vitals and nursing note reviewed  Constitutional:       General: He is not in acute distress  Appearance: Normal appearance  He is well-developed  He is obese  He is not ill-appearing or toxic-appearing  HENT:      Head: Normocephalic and atraumatic  Right Ear: Tympanic membrane, ear canal and external ear normal       Left Ear: Tympanic membrane, ear canal and external ear normal       Nose: No mucosal edema or rhinorrhea  Mouth/Throat:      Mouth: Mucous membranes are not pale, dry and not cyanotic  Pharynx: Oropharynx is clear  No oropharyngeal exudate or posterior oropharyngeal erythema  Eyes:      General: No scleral icterus  Right eye: No discharge  Left eye: No discharge        Extraocular Movements: Extraocular movements intact  Conjunctiva/sclera: Conjunctivae normal       Pupils: Pupils are equal, round, and reactive to light  Cardiovascular:      Rate and Rhythm: Normal rate and regular rhythm  Heart sounds: Normal heart sounds  No murmur heard  No gallop  Pulmonary:      Effort: Pulmonary effort is normal  No respiratory distress  Breath sounds: Normal breath sounds  No wheezing or rales  Abdominal:      General: Abdomen is flat  Palpations: Abdomen is soft  Tenderness: There is no abdominal tenderness  Musculoskeletal:         General: No swelling, tenderness, deformity or signs of injury  Cervical back: Normal range of motion  Right lower leg: No edema  Left lower leg: No edema  Skin:     General: Skin is warm  Coloration: Skin is not jaundiced or pale  Findings: No rash  Neurological:      General: No focal deficit present  Mental Status: He is alert and oriented to person, place, and time  Gait: Gait normal    Psychiatric:         Mood and Affect: Mood normal          Behavior: Behavior normal          Thought Content:  Thought content normal          Judgment: Judgment normal           Rogerio Murillo MD

## 2022-06-23 NOTE — PATIENT INSTRUCTIONS

## 2022-07-11 ENCOUNTER — TELEPHONE (OUTPATIENT)
Dept: PSYCHIATRY | Facility: CLINIC | Age: 57
End: 2022-07-11

## 2022-07-13 ENCOUNTER — TELEPHONE (OUTPATIENT)
Dept: PSYCHIATRY | Facility: CLINIC | Age: 57
End: 2022-07-13

## 2022-07-13 NOTE — TELEPHONE ENCOUNTER
Pt missed provider's call and accidentally deleted the voicemail  Pt requested a call back at [phone]  Thank you

## 2022-07-25 ENCOUNTER — TELEMEDICINE (OUTPATIENT)
Dept: BEHAVIORAL/MENTAL HEALTH CLINIC | Facility: CLINIC | Age: 57
End: 2022-07-25

## 2022-07-25 DIAGNOSIS — F33.41 MAJOR DEPRESSIVE DISORDER, RECURRENT EPISODE, IN PARTIAL REMISSION (HCC): ICD-10-CM

## 2022-07-25 DIAGNOSIS — F41.1 GAD (GENERALIZED ANXIETY DISORDER): Primary | ICD-10-CM

## 2022-07-25 NOTE — BH TREATMENT PLAN
Leo Steinberg  [birth year]       Date of Initial Treatment Plan: 8/4/2021   Date of Current Treatment Plan: 07/25/22    Treatment Plan Number 3     Strengths/Personal Resources for Self Care: Excellent relationship with his girlfriend, engaging in social activities with his parents, children and friends, continuing his interest in golf, baseball      Diagnosis:   1  ALYSHA (generalized anxiety disorder)     2  Major depressive disorder, recurrent episode, in partial remission ([facility] [address] )       Area of Needs: Residual symptoms of depression that appear to be moderate, mild lethargy, previous thoughts of hopelessness and worthlessness, lower mood at times, currently experiencing long covid symptoms        Long Term Goal 1: Maintain or eliminate reduced symptoms of depression, currently 4-5/10 if 10 (7 5/10 on 7/25/2022, 4 5/10 on 1/25/2022) is "worst depression", bringing symptoms down to 1-2/10   "I think one of the biggest reasons for healing is to have closure of my issues with [employer] foods" "I'd like to heal naturally and eventually go off of my medications", will report managing long covid symptoms moderately easier       Target Date: 1/25/2023  Completion Date: N/A         Short Term Objectives for Goal 1: This writer will provide VENKATESH with PCT, SFBT, CBT and ET interventions through discussion about his history with Shayy Sher and attempting to find closure, utilizing empathic listening, reframing, decatastrophizing, looking into any guilt/blame regarding incident, encouraging his strengths, validation of feelings  Building coping skills with this writer through CBT interventions, including various mindfulness practices (including for pain from long covid symptoms)  Explore concepts of esteem, trust and intimacy through psychoeducation and socratic dialogue   EKMARCIA will enagage in any homework assigned, come to therapy sessions, discuss incident with Shayy Sher openly to process and engage with his psychiatrist  Psychiatrist will provide medication management       GOAL 1: Modality: Individual 2x per month   Completion Date n/a, Medication Management and The person(s) responsible for carrying out the plan is  this Madi Barrett and Dr Toledo [employer] and Treatment Plan explained to Carlito, Carlito relates understanding diagnosis and is agreeable to Treatment Plan          Client Comments : Please share your thoughts, feelings, need and/or experiences regarding your treatment plan: He is in agreement with this treatment plan      Nyasia Carrington, [birth year], actively participated in the review and update of this treatment plan during a virtual session, using the [employer]     Nyasia Carrington  provided verbal consent on 7/25/2022 at 11:22 AM  The treatment plan was transcribed into the [employer] Record at a later time

## 2022-07-25 NOTE — PSYCH
Virtual Regular Visit    Verification of patient location:    Patient is located in the following state in which I hold an active license [place]      Assessment/Plan:    Problem List Items Addressed This Visit    None         Goals addressed in session: Goal 1          Reason for visit is   Chief Complaint   Patient presents with    Virtual Regular Visit        Encounter provider Elaine Leach    Provider located at [address] [place] [id number]  [phone]      Recent Visits  No visits were found meeting these conditions  Showing recent visits within past 7 days and meeting all other requirements  Future Appointments  No visits were found meeting these conditions  Showing future appointments within next 150 days and meeting all other requirements       The patient was identified by name and date of birth  Lizzeth Rea was informed that this is a telemedicine visit and that the visit is being conducted throughEpic Embedded and patient was informed this is a secure, HIPAA-complaint platform  He agrees to proceed     My office door was closed  No one else was in the room  He acknowledged consent and understanding of privacy and security of the video platform  The patient has agreed to participate and understands they can discontinue the visit at any time  Patient is aware this is a billable service  Subjective  Lizzeth Rea is a 56 y o  male     Time: 11:04am - 11:44am  Previous session plan: Feels like a failure due to loss of his career, lawsuit - depressed about his future and is considering what he would like to do next, consider stuck points  Reports he has been utilizing putting earmuffs on to help him with visualization   CONTINUED: Check in with Brent Waterman practicing cognitive defusion using [employer] waves, using the prompting questions emailed to him regarding standing on the beach, using grounding and deep breathing  Consider vocational assistance referral   D: He reports he's been experiencing long covid from lacking taste and smell  Reports nerve pain, teeth pain, eye pain  Joined study group for long covid patients  He says he's working with a family physician right now for his symptoms  This writer provided PCT through empathic listening, validation of feelings, reflection of content and feelings, updating treatment plan  A: Lisette Freeman appeared to be in a depressed mood with congruent affect, seemed to be experiencing thoughts of hopelessness and worthlessness, lethargy, avolition, anhedonia, no SI/HI nor CAVAZOS risk apparent or reported, seemed to respond well to PCT  P: Long covid symptoms currently, skin doesn't feel right (even challenging taking a shower), nerve/teeth/eye pain, lack of taste and smell  Joined study group for long covid  Begin mindfulness work for pain, anxiety  Challenges being around others (talking about food at parties, etc)  Consider looking into long covid support groups           HPI     Past Medical History:   Diagnosis Date    Adrenal adenoma     Arthritis     Carpal tunnel syndrome     Chronic back pain     Chronic pain disorder     COVID     CPAP (continuous positive airway pressure) dependence     Cubital tunnel syndrome     Depression     Fatigue     Fatty liver     Folliculitis     Hemorrhoid     Liver lesion     Lumbago     Morbid obesity (HCC)     Myofascial pain syndrome     Neuropathy     Obesity 2015    Scalp lesion     Sleep apnea     CPAP dependance    Sleep difficulties     Thickening of wall of gallbladder     Tinea capitis     Ulnar 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     Umbilical hernia     Vitamin D deficiency        Past Surgical History:   Procedure Laterality Date    CARPAL TUNNEL RELEASE Bilateral     CHOLECYSTECTOMY      COLONOSCOPY      CYST REMOVAL      Scalp    EGD      EYE MUSCLE SURGERY Bilateral     as a child for amblyopia    HAND SURGERY      HEMORROIDECTOMY      HERNIA REPAIR      KNEE ARTHROSCOPY Right     PATELLA SURGERY Right     scraped out scar tissue per pt     [place] COLONOSCOPY FLX DX W/COLLJ SPEC WHEN PFRMD N/A 2/10/2016    Procedure: COLONOSCOPY;  Surgeon: Temo Gibbons MD;  [facility]; Service: Colorectal    [place] EGD TRANSORAL BIOPSY SINGLE/MULTIPLE N/A 8/23/2017    Procedure: ESOPHAGOGASTRODUODENOSCOPY (EGD) with bx;  Surgeon: Aamir Oneill MD;  [facility]; Service: Bariatrics    [place] EXC SKIN BENIG 0 6-1 CM REMAINDR BODY N/A 12/8/2016    Procedure: EXCISION OF SCALP LESION;  Surgeon: Mia Jones MD;  [facility];  Service: General    [place] HEMORRHOIDECTOMY,INT/EXT, 2+ COLUMNS/GROUPS N/A 2/12/2016    Procedure: HEMORRHOIDECTOMY EXCISION, EUA, Anal fistulotomy;  Surgeon: Temo Gibbons MD;  [facility];  Service: Colorectal    [place] LAP,CHOLECYSTECTOMY N/A 3/11/2021    Procedure: CHOLECYSTECTOMY LAPAROSCOPIC;  Surgeon: Chester Jones MD;  [facility];  Service: General    [place] REPAIR UMBILICAL SXRO,7+V/H,CXWHY N/A 3/11/2021    Procedure: REPAIR HERNIA UMBILICAL RECURRENT;  Surgeon: Chester Jones MD;  [facility];  Service: General    UMBILICAL HERNIA REPAIR         Current Outpatient Medications   Medication Sig Dispense Refill    busPIRone (BUSPAR) 5 mg tablet Take 1 tablet (5 mg total) by mouth 2 (two) times a day 60 tablet 0    gabapentin (NEURONTIN) 300 mg capsule Take 1 capsule (300 mg total) by mouth in the morning and 1 capsule (300 mg total) in the evening and 1 capsule (300 mg total) before bedtime   90 capsule 4    meloxicam (Mobic) 15 mg tablet Take 1 tablet (15 mg total) by mouth daily With meals 30 tablet 0    tiZANidine (ZANAFLEX) 2 mg tablet Take 1 tablet (2 mg total) by mouth every 8 (eight) hours as needed for muscle spasms 20 tablet 0    venlafaxine (EFFEXOR-XR) 150 mg 24 hr capsule Take 1 capsule (150 mg total) by mouth in the morning  30 capsule 4    zolpidem (AMBIEN) 10 mg tablet Take 1 tablet (10 mg total) by mouth daily at bedtime as needed for sleep To be filled on or after 5/23/22 30 tablet 4     No current facility-administered medications for this visit  Allergies   Allergen Reactions    Penicillins Rash       Review of Systems    Video Exam    There were no vitals filed for this visit  Physical Exam     I spent 40 minutes directly with the patient during this visit     It was my intent to perform this visit via video technology but the patient was not able to do a video connection so the visit was completed via audio telephone only  VIRTUAL VISIT DISCLAIMER    Mendel Sanjuanita verbally agrees to participate in [employer]  Pt is aware that [employer] could be limited without vital signs or the ability to perform a full hands-on physical exam  Carlito Palma understands he or the provider may request at any time to terminate the video visit and request the patient to seek care or treatment in person

## 2022-08-08 ENCOUNTER — TELEMEDICINE (OUTPATIENT)
Dept: BEHAVIORAL/MENTAL HEALTH CLINIC | Facility: CLINIC | Age: 57
End: 2022-08-08

## 2022-08-08 DIAGNOSIS — F41.1 GAD (GENERALIZED ANXIETY DISORDER): ICD-10-CM

## 2022-08-08 DIAGNOSIS — F33.1 MAJOR DEPRESSIVE DISORDER, RECURRENT EPISODE, MODERATE (HCC): Primary | ICD-10-CM

## 2022-08-08 NOTE — PSYCH
Virtual Regular Visit    Verification of patient location:    Patient is located in the following state in which I hold an active license [place]      Assessment/Plan:    Problem List Items Addressed This Visit        Other    ALYSHA (generalized anxiety disorder) (Chronic)      Other Visit Diagnoses     Major depressive disorder, recurrent episode, moderate ([facility] [address] )    -  Primary          Goals addressed in session: Goal 1          Reason for visit is   Chief Complaint   Patient presents with    Virtual Regular Visit        Encounter provider Rodgers Cranker    Provider located at [address] [place] [id number]  [phone]      Recent Visits  No visits were found meeting these conditions  Showing recent visits within past 7 days and meeting all other requirements  Today's Visits  Date Type Provider Dept   08/08/22 [address] [place] [address] today's visits and meeting all other requirements  Future Appointments  No visits were found meeting these conditions  Showing future appointments within next 150 days and meeting all other requirements       The patient was identified by name and date of birth  Casa Arriola was informed that this is a telemedicine visit and that the visit is being conducted throughEpic Embedded and patient was informed this is a secure, HIPAA-complaint platform  He agrees to proceed     My office door was closed  No one else was in the room  He acknowledged consent and understanding of privacy and security of the video platform  The patient has agreed to participate and understands they can discontinue the visit at any time  Patient is aware this is a billable service       Subjective  Casa Arriola is a 56 y o  male     Time: 9:03am - 9:52am  Previous session plan: Long covid symptoms currently, skin doesn't feel right (even challenging taking a shower), nerve/teeth/eye pain, lack of taste and smell  Joined study group for long covid  Begin mindfulness work for pain, anxiety  Challenges being around others (talking about food at parties, etc)  Consider looking into long covid support groups  D: He reports he's still not feeling well in regard to his long covid symptoms  He says he did visit his girlfriend this weekend and enjoyed his time with her, despite feeling exhausted  He says it's difficult for him to interact with his girlfriend at times due to his feeling "consumed" with how awful he feels in general  He says he did receive a new machine for his CPAP  Practiced pendulation in session today for his pain, debriefed on his experience  This writer provided CBT through pendulation psychoeducation and practice in session together, PCT through resource and referral, empathic listening, validation of feelings, reflection of content and feelings  A: Deleta Laser appeared to be in a mildly depressed mood with congruent affect, seemed to be experiencing depressed mood, lethargy, mild anhedonia, mild avolition, excessive worry, racing thoughts at times, mild insomnia, appeared to be kempt, no SI/HI nor CAVAZOS risk apparent or reported, seemed to respond well to PCT, CBT, practicing pendulation today  P: Gave covid support rhiannon  Has a new CPAP machine  Check in practicing pendulation for his pain, consider other forms of mindfulness if it isn't useful  CONTINUED: Long covid symptoms currently, skin doesn't feel right (even challenging taking a shower), nerve/teeth/eye pain, lack of taste and smell  Joined study group for long covid  Begin mindfulness work for pain, anxiety  Challenges being around others (talking about food at parties, etc)  Consider looking into long covid support groups           HPI     Past Medical History:   Diagnosis Date    Adrenal adenoma     Arthritis     Carpal tunnel syndrome     Chronic back pain  Chronic pain disorder     COVID     CPAP (continuous positive airway pressure) dependence     Cubital tunnel syndrome     Depression     Fatigue     Fatty liver     Folliculitis     Hemorrhoid     Liver lesion     Lumbago     Morbid obesity (HCC)     Myofascial pain syndrome     Neuropathy     Obesity 2015    Scalp lesion     Sleep apnea     CPAP dependance    Sleep difficulties     Thickening of wall of gallbladder     Tinea capitis     Ulnar 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     Umbilical hernia     Vitamin D deficiency        Past Surgical History:   Procedure Laterality Date    CARPAL TUNNEL RELEASE Bilateral     CHOLECYSTECTOMY      COLONOSCOPY      CYST REMOVAL      Scalp    EGD      EYE MUSCLE SURGERY Bilateral     as a child for amblyopia    HAND SURGERY      HEMORROIDECTOMY      HERNIA REPAIR      KNEE ARTHROSCOPY Right     PATELLA SURGERY Right     scraped out scar tissue per pt     [place] COLONOSCOPY FLX DX W/COLLJ SPEC WHEN PFRMD N/A 2/10/2016    Procedure: COLONOSCOPY;  Surgeon: Zenia Guido MD;  [facility]; Service: Colorectal    [place] EGD TRANSORAL BIOPSY SINGLE/MULTIPLE N/A 8/23/2017    Procedure: ESOPHAGOGASTRODUODENOSCOPY (EGD) with bx;  Surgeon: Augie Aldridge MD;  [facility];   Service: Bariatrics    [place] EXC SKIN BENIG 0 6-1 CM REMAINDR BODY N/A 12/8/2016    Procedure: EXCISION OF SCALP LESION;  Surgeon: Madeline Hernandez MD;  [facility];  Service: General    [place] HEMORRHOIDECTOMY,INT/EXT, 2+ COLUMNS/GROUPS N/A 2/12/2016    Procedure: HEMORRHOIDECTOMY EXCISION, EUA, Anal fistulotomy;  Surgeon: Zenia Guido MD;  [facility];  Service: Colorectal    [place] LAP,CHOLECYSTECTOMY N/A 3/11/2021    Procedure: CHOLECYSTECTOMY LAPAROSCOPIC;  Surgeon: Caroline Toscano MD;  [facility];  Service: General    [place] REPAIR UMBILICAL GGTX,8+Z/G,CHEPW N/A 3/11/2021    Procedure: REPAIR HERNIA UMBILICAL RECURRENT;  Surgeon: Caroline Toscano MD; Location:  MAIN OR;  Service: General    UMBILICAL HERNIA REPAIR         Current Outpatient Medications   Medication Sig Dispense Refill    busPIRone (BUSPAR) 5 mg tablet Take 1 tablet (5 mg total) by mouth 2 (two) times a day 60 tablet 0    gabapentin (NEURONTIN) 300 mg capsule Take 1 capsule (300 mg total) by mouth in the morning and 1 capsule (300 mg total) in the evening and 1 capsule (300 mg total) before bedtime  90 capsule 4    meloxicam (Mobic) 15 mg tablet Take 1 tablet (15 mg total) by mouth daily With meals 30 tablet 0    tiZANidine (ZANAFLEX) 2 mg tablet Take 1 tablet (2 mg total) by mouth every 8 (eight) hours as needed for muscle spasms 20 tablet 0    venlafaxine (EFFEXOR-XR) 150 mg 24 hr capsule Take 1 capsule (150 mg total) by mouth in the morning  30 capsule 4    zolpidem (AMBIEN) 10 mg tablet Take 1 tablet (10 mg total) by mouth daily at bedtime as needed for sleep To be filled on or after 5/23/22 30 tablet 4     No current facility-administered medications for this visit  Allergies   Allergen Reactions    Penicillins Rash       Review of Systems    Video Exam    There were no vitals filed for this visit  Physical Exam     I spent 49 minutes directly with the patient during this visit    VIRTUAL VISIT [address] [facility] verbally agrees to participate in [employer]  Pt is aware that [employer] could be limited without vital signs or the ability to perform a full hands-on physical exam  Carlito Palma understands he or the provider may request at any time to terminate the video visit and request the patient to seek care or treatment in person

## 2022-08-22 ENCOUNTER — TELEMEDICINE (OUTPATIENT)
Dept: BEHAVIORAL/MENTAL HEALTH CLINIC | Facility: CLINIC | Age: 57
End: 2022-08-22

## 2022-08-22 DIAGNOSIS — F41.1 GAD (GENERALIZED ANXIETY DISORDER): Primary | ICD-10-CM

## 2022-08-22 DIAGNOSIS — F33.1 MAJOR DEPRESSIVE DISORDER, RECURRENT EPISODE, MODERATE (HCC): ICD-10-CM

## 2022-08-22 NOTE — PSYCH
Virtual Regular Visit    Verification of patient location:    Patient is located in the following state in which I hold an active license [place]      Assessment/Plan:    Problem List Items Addressed This Visit        Other    ALYSHA (generalized anxiety disorder) - Primary (Chronic)      Other Visit Diagnoses     Major depressive disorder, recurrent episode, moderate ([facility] [address] )              Goals addressed in session: Goal 1          Reason for visit is   Chief Complaint   Patient presents with    Virtual Regular Visit        Encounter provider Rafael Villar    Provider located at [address] [place] [id number] [phone]      Recent Visits  No visits were found meeting these conditions  Showing recent visits within past 7 days and meeting all other requirements  Today's Visits  Date Type Provider Dept   08/22/22 [address] [place] [address] today's visits and meeting all other requirements  Future Appointments  No visits were found meeting these conditions  Showing future appointments within next 150 days and meeting all other requirements       The patient was identified by name and date of birth  Stefan Meza was informed that this is a telemedicine visit and that the visit is being conducted through[employer] Embedded and patient was informed this is a secure, HIPAA-complaint platform  He agrees to proceed     My office door was closed  No one else was in the room  He acknowledged consent and understanding of privacy and security of the video platform  The patient has agreed to participate and understands they can discontinue the visit at any time  Patient is aware this is a billable service  Subjective  Stefan Meza is a 56 y o  male     Time: 9:01am - 9:47am  Previous session plan:  Gave covid support rhiannon  Has a new CPAP machine   Check in practicing pendulation for his pain, consider other forms of mindfulness if it isn't useful  CONTINUED: Long covid symptoms currently, skin doesn't feel right (even challenging taking a shower), nerve/teeth/eye pain, lack of taste and smell  Joined study group for long covid  Begin mindfulness work for pain, anxiety  Challenges being around others (talking about food at parties, etc)  Consider looking into long covid support groups    D: He reports he's calling from the [employer]  "Weekend was ok" "Had a rough week"  He says he drove his father down to [place] to meet the oncologist to set up chemotherapy  Discussed the stressful nature of this  Says his "step-son", whose mother moved them to the [address] [place] [address], then they moved back to live with their mother in [facility]  He says things went bad, the police were involved  [place] protective services are involved  He says he is considering taking him into his girlfriend's home along with his own move  Strong focus on the importance of recognizing his thoughts about the situation, responsibility  This writer provided PCT through empathic listening, validation of feelings, reflection of content and feelings, CBT work through reframing, normalization, psychoeducation  A: Zoë Villegas appeared to be in a neutral, calm mood with congruent affect, seemed to be experiencing lower mood at times, excessive worry, mild lethargy, moderate anhedonia, appeared to be kempt, no SI/HI nor CAVAZOS risk apparent or reported, seemed to respond well to PCT, CBT work  P: Check in practicing pendulation for his pain, consider other forms of mindfulness if it isn't useful  Says he's going to be driving his father down to [facility] for chemotherapy 1 day every other week  Check in with "step-son" possibly living with he and his girlfriend", he's waiting to move in with his girlfriend until her ex- moves out  Gave covid support subreddit  Has a new CPAP machine   CONTINUED: Long covid symptoms currently, skin doesn't feel right (even challenging taking a shower), nerve/teeth/eye pain, lack of taste and smell  Joined study group for long covid  Begin mindfulness work for pain, anxiety  Challenges being around others (talking about food at parties, etc)  Consider looking into long covid support groups          HPI     Past Medical History:   Diagnosis Date    Adrenal adenoma     Arthritis     Carpal tunnel syndrome     Chronic back pain     Chronic pain disorder     COVID     CPAP (continuous positive airway pressure) dependence     Cubital tunnel syndrome     Depression     Fatigue     Fatty liver     Folliculitis     Hemorrhoid     Liver lesion     Lumbago     Morbid obesity (HCC)     Myofascial pain syndrome     Neuropathy     Obesity 2015    Scalp lesion     Sleep apnea     CPAP dependance    Sleep difficulties     Thickening of wall of gallbladder     Tinea capitis     Ulnar 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     Umbilical hernia     Vitamin D deficiency        Past Surgical History:   Procedure Laterality Date    CARPAL TUNNEL RELEASE Bilateral     CHOLECYSTECTOMY      COLONOSCOPY      CYST REMOVAL      Scalp    EGD      EYE MUSCLE SURGERY Bilateral     as a child for amblyopia    HAND SURGERY      HEMORROIDECTOMY      HERNIA REPAIR      KNEE ARTHROSCOPY Right     PATELLA SURGERY Right     scraped out scar tissue per pt     [place] COLONOSCOPY FLX DX W/COLLJ SPEC WHEN PFRMD N/A 2/10/2016    Procedure: COLONOSCOPY;  Surgeon: Travis Arellano MD;  [facility]; Service: Colorectal    [place] EGD TRANSORAL BIOPSY SINGLE/MULTIPLE N/A 8/23/2017    Procedure: ESOPHAGOGASTRODUODENOSCOPY (EGD) with bx;  Surgeon: Heather Bravo MD;  [facility];   Service: Bariatrics    [place] EXC SKIN BENIG 0 6-1 CM REMAINDR BODY N/A 12/8/2016    Procedure: EXCISION OF SCALP LESION;  Surgeon: Carol Leo MD;  [facility];  Service: General    [place] HEMORRHOIDECTOMY,INT/EXT, 2+ COLUMNS/GROUPS N/A 2/12/2016    Procedure: HEMORRHOIDECTOMY EXCISION, EUA, Anal fistulotomy;  Surgeon: Baldo George MD;  [facility];  Service: Colorectal    [place] LAP,CHOLECYSTECTOMY N/A 3/11/2021    Procedure: CHOLECYSTECTOMY LAPAROSCOPIC;  Surgeon: Tee Peters MD;  [facility];  Service: General    [place] REPAIR UMBILICAL VZFA,2+Y/K,PCXBO N/A 3/11/2021    Procedure: REPAIR HERNIA UMBILICAL RECURRENT;  Surgeon: Tee Peters MD;  [facility];  Service: General    UMBILICAL HERNIA REPAIR         Current Outpatient Medications   Medication Sig Dispense Refill    busPIRone (BUSPAR) 5 mg tablet Take 1 tablet (5 mg total) by mouth 2 (two) times a day 60 tablet 0    gabapentin (NEURONTIN) 300 mg capsule Take 1 capsule (300 mg total) by mouth in the morning and 1 capsule (300 mg total) in the evening and 1 capsule (300 mg total) before bedtime  90 capsule 4    meloxicam (Mobic) 15 mg tablet Take 1 tablet (15 mg total) by mouth daily With meals 30 tablet 0    tiZANidine (ZANAFLEX) 2 mg tablet Take 1 tablet (2 mg total) by mouth every 8 (eight) hours as needed for muscle spasms 20 tablet 0    venlafaxine (EFFEXOR-XR) 150 mg 24 hr capsule Take 1 capsule (150 mg total) by mouth in the morning  30 capsule 4    zolpidem (AMBIEN) 10 mg tablet Take 1 tablet (10 mg total) by mouth daily at bedtime as needed for sleep To be filled on or after 5/23/22 30 tablet 4     No current facility-administered medications for this visit  Allergies   Allergen Reactions    Penicillins Rash       Review of Systems    Video Exam    There were no vitals filed for this visit      Physical Exam     I spent 46 minutes directly with the patient during this visit

## 2022-09-19 ENCOUNTER — TELEMEDICINE (OUTPATIENT)
Dept: BEHAVIORAL/MENTAL HEALTH CLINIC | Facility: CLINIC | Age: 57
End: 2022-09-19

## 2022-09-19 DIAGNOSIS — Z91.199 NO-SHOW FOR APPOINTMENT: Primary | ICD-10-CM

## 2022-09-19 NOTE — PSYCH
No Call  No Show  No Charge    Vivi Guillaume no showed 29/44/92 appointment , staff called and was unable to leave message on full voice mailbox  Will discharge if not heard from by 11/2022    Treatment Plan not due at this session

## 2022-10-12 PROBLEM — Z00.00 MEDICARE ANNUAL WELLNESS VISIT, SUBSEQUENT: Status: RESOLVED | Noted: 2021-05-13 | Resolved: 2022-10-12

## 2022-10-17 DIAGNOSIS — G47.09 OTHER INSOMNIA: Primary | Chronic | ICD-10-CM

## 2022-10-17 RX ORDER — ZOLPIDEM TARTRATE 10 MG/1
10 TABLET ORAL
Qty: 30 TABLET | Refills: 0 | Status: SHIPPED | OUTPATIENT
Start: 2022-10-17 | End: 2022-11-16

## 2022-10-17 NOTE — TELEPHONE ENCOUNTER
Medication Refill Request     Name of Medication *Zolpidem  Dose/Frequency 10 mg 1 daily  Quantity 30  Verified pharmacy   [x]  Verified ordering Provider   [x]  Does patient have enough for the next 3 days? Yes [x] No []  Does patient have a follow-up appointment scheduled? Yes [x] No []   If so when is appointment: 11/4/2022  2:30 p m

## 2022-11-01 NOTE — PSYCH
MEDICATION MANAGEMENT NOTE        [facility]      Name and Date of Birth:  Shawn Mack 56 y o  [birth year] MRN: [id number]    Date of Visit: November 4, 2022    Reason for Visit:   Chief Complaint   Patient presents with   • Medication Management   • Follow-up       SUBJECTIVE:    Diya Puri is seen today for a follow up for Major Depressive Disorder, Generalized Anxiety Disorder and insomnia  He has decompensated since the last visit  He feels more depressed, rates mood as 8 on a scale of 1 (best mood) to 10 (worst mood)  He reports increased anxiety symptoms  He has been frustrated with symptoms of long COVID since his COVID infection in May 2022 "I have abnormal taste and smell, stomach problems, nerve pain, joint pain, muscle weakness, fatigue, rapid heart beat, sweating"  He states that he got accepted in study group in [facility], but is not sure if he will participate "they don't provide any treatment"  He denies any suicidal ideation, intent or plan at present; denies any homicidal ideation, intent or plan at present  He has no auditory hallucinations, denies any visual hallucinations, has no delusional thoughts  He denies any side effects from current psychiatric medications      HPI ROS Appetite Changes and Sleep:     He reports normal sleep, adequate number of sleep hours (7 hours), decreased appetite, recent weight gain (16 lbs), low energy    Current Rating Scores:     Current PHQ-9   PHQ-2/9 Depression Screening    Little interest or pleasure in doing things: 3 - nearly every day  Feeling down, depressed, or hopeless: 3 - nearly every day  Trouble falling or staying asleep, or sleeping too much: 1 - several days  Feeling tired or having little energy: 3 - nearly every day  Poor appetite or overeating: 3 - nearly every day  Feeling bad about yourself - or that you are a failure or have let yourself or your family down: 3 - nearly every day  Trouble concentrating on things, such as reading the newspaper or watching television: 3 - nearly every day  Moving or speaking so slowly that other people could have noticed  Or the opposite - being so fidgety or restless that you have been moving around a lot more than usual: 0 - not at all  Thoughts that you would be better off dead, or of hurting yourself in some way: 0 - not at all  PHQ-9 Score: 19   PHQ-9 Interpretation: Moderately severe depression        Current PHQ-9 score is increased from 8 at the last visit)  Review Of Systems:      Constitutional sweating, low energy and recent weight gain (16 lbs)   ENT loss of taste and smell   Cardiovascular palpitations   Respiratory dyspnea on exertion   Gastrointestinal abdominal discomfort, constipation and diarrhea   Genitourinary negative   Musculoskeletal joint pain   Integumentary negative   Neurological headache   Endocrine negative   Other Symptoms none, all other systems are negative       Past Psychiatric History: (unchanged information from previous note copied and updated)    Past Inpatient Psychiatric Treatment:   No history of past inpatient psychiatric admissions  Past Outpatient Psychiatric Treatment:    In outpatient treatment at [address] [place] [address] E since 2/2017    Past Suicide Attempts: no  Past Violent Behavior: no  Past Psychiatric Medication Trials: Lexapro, Zoloft, Wellbutrin XL, Trazodone, Abilify, Ambien, Cymbalta, Buspar and Melatonin    Traumatic History: (unchanged information from previous note copied and updated)    Abuse: no history of sexual abuse, no history of physical abuse  Other Traumatic Events: none     Past Medical History:    Past Medical History:   Diagnosis Date   • Adrenal adenoma    • Arthritis    • Carpal tunnel syndrome    • Chronic back pain    • Chronic pain disorder    • COVID    • CPAP (continuous positive airway pressure) dependence    • Cubital tunnel syndrome    • Depression    • Fatigue • Fatty liver    • Folliculitis    • Hemorrhoid    • Liver lesion    • Lumbago    • Morbid obesity ([address] )    • Myofascial pain syndrome    • Neuropathy    • Obesity 2015   • Scalp lesion    • Sleep apnea     CPAP dependance   • Sleep difficulties    • Thickening of wall of gallbladder    • Tinea capitis    • Ulnar 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    • Umbilical hernia    • Vitamin D deficiency         Past Surgical History:   Procedure Laterality Date   • CARPAL TUNNEL RELEASE Bilateral    • CHOLECYSTECTOMY     • COLONOSCOPY     • CYST REMOVAL      Scalp   • EGD     • EYE MUSCLE SURGERY Bilateral     as a child for amblyopia   • HAND SURGERY     • HEMORROIDECTOMY     • HERNIA REPAIR     • KNEE ARTHROSCOPY Right    • PATELLA SURGERY Right     scraped out scar tissue per pt    • [place] COLONOSCOPY FLX DX W/COLLJ SPEC WHEN PFRMD N/A 2/10/2016    Procedure: COLONOSCOPY;  Surgeon: Rogers Roberto MD;  Location: [facility]; Service: Colorectal   • [place] EGD TRANSORAL BIOPSY SINGLE/MULTIPLE N/A 8/23/2017    Procedure: ESOPHAGOGASTRODUODENOSCOPY (EGD) with bx;  Surgeon: Clinton Cespedes MD;  Location: [facility];   Service: Bariatrics   • [place] EXC SKIN BENIG 0 6-1 CM REMAINDR BODY N/A 12/8/2016    Procedure: EXCISION OF SCALP LESION;  Surgeon: Mo Kay MD;  [facility];  Service: General   • KY HEMORRHOIDECTOMY,INT/EXT, 2+ COLUMNS/GROUPS N/A 2/12/2016    Procedure: HEMORRHOIDECTOMY EXCISION, EUA, Anal fistulotomy;  Surgeon: Rogers Roberto MD;  [facility];  Service: Colorectal   • KY LAP,CHOLECYSTECTOMY N/A 3/11/2021    Procedure: Elverna Madelaine;  Surgeon: Buffy Swenson MD;  Location: [facility];  Service: General   • KY REPAIR UMBILICAL NZYL,7+C/Y,HQSST N/A 3/11/2021    Procedure: REPAIR HERNIA UMBILICAL RECURRENT;  Surgeon: Buffy Swenson MD;  Location: [facility];  Service: General   • UMBILICAL HERNIA REPAIR       Allergies   Allergen Reactions   • Penicillins Rash       Substance Abuse History:    Social History     Substance and Sexual Activity   Alcohol Use Not Currently    Comment: Social alcohol use     Social History     Substance and Sexual Activity   Drug Use No       Social History:    Social History     Socioeconomic History   • Marital status: Legally      Spouse name: Not on file   • Number of children: 1   • Years of education: some college   • Highest education level: Some college, no degree   Occupational History   • Occupation: on disability, works part time in food delivery   Tobacco Use   • Smoking status: Never Smoker   • Smokeless tobacco: Never Used   Vaping Use   • Vaping Use: Never used   Substance and Sexual Activity   • Alcohol use: Not Currently     Comment: Social alcohol use   • Drug use: No   • Sexual activity: Yes     Partners: Female     Birth control/protection: Condom Male   Other Topics Concern   • Not on file   Social History Narrative    Education: some college    Learning Disabilities: none    Marital History:     Children: 1 adult son    Living Arrangement: lives in home with parents    Occupational History: on disability, employed part time in food delivery, 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    [facility]: good support system    Legal History: none     History: None        Most recent tobacco use screenin2018     Social Determinants of Health     Financial Resource Strain: Low Risk    • Difficulty of Paying Living Expenses: Not hard at all   Food Insecurity: No Food Insecurity   • Worried About Running Out of Food in the Last Year: Never true   • Ran Out of Food in the Last Year: Never true   Transportation Needs: No Transportation Needs   • Lack of Transportation (Medical): No   • Lack of Transportation (Non-Medical):  No   Physical Activity: Inactive   • Days of Exercise per Week: 0 days   • Minutes of Exercise per Session: 0 min   Stress: Stress Concern Present • Feeling of Stress : To some extent   Social Connections: Socially Isolated   • Frequency of Communication with Friends and Family: More than three times a week   • Frequency of Social Gatherings with Friends and Family: Twice a week   • Attends Worship Services: Never   • Active Member of Clubs or Organizations: No   • Attends Club or Organization Meetings: Never   • Marital Status:    Intimate Partner Violence: Not At Risk   • Fear of Current or Ex-Partner: No   • Emotionally Abused: No   • Physically Abused: No   • Sexually Abused: No   Housing Stability: Low Risk    • Unable to Pay for Housing in the Last Year: No   • Number of Places Lived in the Last Year: 1   • Unstable Housing in the Last Year: No       Family Psychiatric History:     Family History   Problem Relation Age of Onset   • Cancer Mother    • Diabetes type II Father    • Cancer Father    • Depression Sister    • Suicide Attempts Sister    • No Known Problems Sister    • Substance Abuse Neg Hx        History Review:  The following portions of the patient's history were reviewed and updated as appropriate: allergies, current medications, past family history, past medical history, past social history, past surgical history and problem list          OBJECTIVE:     Vital signs in last 24 hours:    Vitals:    11/04/22 1440   BP: 132/86   Pulse: 91   Weight: (!) 142 kg (313 lb)   Height: 5' 10" (1 778 m)       Mental Status Evaluation:    Appearance age appropriate, casually dressed   Behavior cooperative, appears anxious   Speech normal rate, normal volume, normal pitch   Mood depressed, anxious   Affect constricted   Thought Processes organized, goal directed   Associations intact associations   Thought Content no overt delusions   Perceptual Disturbances: no auditory hallucinations, no visual hallucinations   Abnormal Thoughts  Risk Potential Suicidal ideation - None  Homicidal ideation - None  Potential for aggression - No   Orientation oriented to person, place, time/date and situation   Memory recent and remote memory grossly intact   Consciousness alert and awake   Attention Span Concentration Span decreased attention span  decreased concentration   Intellect appears to be of average intelligence   Insight intact   Judgement intact   Muscle Strength and  Gait normal muscle strength and normal muscle tone, normal gait and normal balance   Motor activity no abnormal movements   Language no difficulty naming common objects, no difficulty repeating a phrase, no difficulty writing a sentence   Fund of Knowledge adequate knowledge of current events  adequate fund of knowledge regarding past history  adequate fund of knowledge regarding vocabulary    Pain moderate   Pain Scale 6       Laboratory Results: I have personally reviewed all pertinent laboratory/tests results    Recent Labs (last 6 months):   No visits with results within 6 Month(s) from this visit  Latest known visit with results is:   Appointment on 05/27/2021   Component Date Value   • Hemoglobin A1C 05/27/2021 5 6    • EAG 05/27/2021 114    • Cholesterol 05/27/2021 144    • Triglycerides 05/27/2021 89    • HDL, Direct 05/27/2021 34 (A)   • LDL Calculated 05/27/2021 92    • Non-HDL-Chol (CHOL-HDL) 05/27/2021 110    • PSA 05/27/2021 2 1    • Glucose, Fasting 05/27/2021 109 (A)   • Vit D, 25-Hydroxy 05/27/2021 32 8        Suicide/Homicide Risk Assessment:    Risk of Harm to Self:  Demographic risk factors include: , , age: over 48 or older  Historical Risk Factors include: chronic depression, chronic anxiety symptoms  Recent Specific Risk Factors include: diagnosis of depression, current depressive symptoms, current anxiety symptoms, health problems  Protective Factors: no current suicidal ideation, compliant with medications, compliant with mental health treatment, responsibilities and duties to others, stable living environment, supportive family  Weapons: gun   The following steps have been taken to ensure weapons are properly secured: locked  Based on today's assessment, Del Perez presents the following risk of harm to self: low    Risk of Harm to Others: The following ratings are based on assessment at the time of the interview  Based on today's assessment, Del Perez presents the following risk of harm to others: none    The following interventions are recommended: no intervention changes needed    Assessment/Plan:       Diagnoses and all orders for this visit:    Major depressive disorder, recurrent episode, moderate (HCC)  -     venlafaxine (EFFEXOR-XR) 75 mg 24 hr capsule; Take 1 capsule (75 mg total) by mouth daily Take with Effexor  mg for a total dose of 225 mg daily  -     venlafaxine (EFFEXOR-XR) 150 mg 24 hr capsule; Take 1 capsule (150 mg total) by mouth daily Take with Effexor XR 75 mg for a total dose of 225 mg daily    ALYSHA (generalized anxiety disorder)  -     venlafaxine (EFFEXOR-XR) 150 mg 24 hr capsule; Take 1 capsule (150 mg total) by mouth daily Take with Effexor XR 75 mg for a total dose of 225 mg daily  -     gabapentin (NEURONTIN) 400 mg capsule; Take 1 capsule (400 mg total) by mouth 3 (three) times a day    Other insomnia  -     zolpidem (AMBIEN) 10 mg tablet; Take 1 tablet (10 mg total) by mouth daily at bedtime as needed for sleep Do not start before November 16, 2022            Treatment Recommendations/Precautions:    Increase Effexor XR to 225 mg daily to improve depressive symptoms  Increase Neurontin to 400 mg three times a day to improve anxiety symptoms  Continue Ambien 10 mg at bedtime as needed to help with insomnia  Medication management every 2 months  Continue psychotherapy with [facility] therapist [address] [place] [address] with family physician for yearly physical exam, arthritis, chronic pain, gastrointestinal issues, sleep apnea and long COVID  Aware of 24 hour and weekend coverage for urgent situations accessed by calling [facility] main practice number    Medications Risks/Benefits      Risks, Benefits And Possible Side Effects Of Medications:    Risks, benefits, and possible side effects of medications explained to Dilma including risk of suicidality and serotonin syndrome related to treatment with antidepressants and risk of impaired next-day mental alertness, complex sleep-related behavior and dependence related to treatment with hypnotic medications  He verbalizes understanding and agreement for treatment  Controlled Medication Discussion:     Dilma has been filling controlled prescriptions on time as prescribed according to [address] Monitoring Program    Psychotherapy Provided:     Individual psychotherapy provided: Yes  Counseling was provided during the session today for 16 minutes  Medications, treatment progress and treatment plan reviewed with Dilma  Medication changes discussed with Dilma  Medication education provided to Dilma  Goals discussed during in session: improve control of anxiety and help with depression  Discussed with Dilma coping with medical problems, ongoing anxiety and chronic mental illness  Coping strategies including talking to a therapist, starting to exercise and visualization techniques reviewed with Dilma  Supportive therapy provided  Treatment Plan:    Completed and signed during the session: Not applicable - Treatment Plan to be completed by [address] [place] [address] E therapist    Note Share: This note was shared with patient      Visit Time    Visit Start Time: 2:33 PM  Visit Stop Time: 3:07 PM  Total Visit Duration: 34 minutes    Linn Sheridan MD 11/04/22

## 2022-11-04 ENCOUNTER — OFFICE VISIT (OUTPATIENT)
Dept: PSYCHIATRY | Facility: CLINIC | Age: 57
End: 2022-11-04

## 2022-11-04 VITALS
SYSTOLIC BLOOD PRESSURE: 132 MMHG | BODY MASS INDEX: 44.81 KG/M2 | WEIGHT: 313 LBS | HEART RATE: 91 BPM | HEIGHT: 70 IN | DIASTOLIC BLOOD PRESSURE: 86 MMHG

## 2022-11-04 DIAGNOSIS — F41.1 GAD (GENERALIZED ANXIETY DISORDER): Chronic | ICD-10-CM

## 2022-11-04 DIAGNOSIS — G47.09 OTHER INSOMNIA: Chronic | ICD-10-CM

## 2022-11-04 DIAGNOSIS — F33.1 MAJOR DEPRESSIVE DISORDER, RECURRENT EPISODE, MODERATE (HCC): Primary | Chronic | ICD-10-CM

## 2022-11-04 RX ORDER — VENLAFAXINE HYDROCHLORIDE 150 MG/1
150 CAPSULE, EXTENDED RELEASE ORAL DAILY
Qty: 30 CAPSULE | Refills: 4 | Status: SHIPPED | OUTPATIENT
Start: 2022-11-04 | End: 2023-04-03

## 2022-11-04 RX ORDER — ZOLPIDEM TARTRATE 10 MG/1
10 TABLET ORAL
Qty: 30 TABLET | Refills: 4 | Status: SHIPPED | OUTPATIENT
Start: 2022-11-16 | End: 2023-04-15

## 2022-11-04 RX ORDER — GABAPENTIN 400 MG/1
400 CAPSULE ORAL 3 TIMES DAILY
Qty: 90 CAPSULE | Refills: 4 | Status: SHIPPED | OUTPATIENT
Start: 2022-11-04 | End: 2023-04-03

## 2022-11-04 RX ORDER — VENLAFAXINE HYDROCHLORIDE 75 MG/1
75 CAPSULE, EXTENDED RELEASE ORAL DAILY
Qty: 30 CAPSULE | Refills: 4 | Status: SHIPPED | OUTPATIENT
Start: 2022-11-04 | End: 2023-04-03

## 2022-11-07 ENCOUNTER — TELEPHONE (OUTPATIENT)
Dept: PSYCHIATRY | Facility: CLINIC | Age: 57
End: 2022-11-07

## 2022-11-07 NOTE — TELEPHONE ENCOUNTER
Called patient to schedule 2 month follow up from appt on 11/4/22  Please schedule for next available appt and place on wait list  Was unable to leave voice mail due to mailbox being full

## 2022-12-21 ENCOUNTER — DOCUMENTATION (OUTPATIENT)
Dept: PSYCHIATRY | Facility: CLINIC | Age: 57
End: 2022-12-21

## 2022-12-21 ENCOUNTER — TELEPHONE (OUTPATIENT)
Dept: BEHAVIORAL/MENTAL HEALTH CLINIC | Facility: CLINIC | Age: 57
End: 2022-12-21

## 2023-04-03 ENCOUNTER — OFFICE VISIT (OUTPATIENT)
Dept: FAMILY MEDICINE CLINIC | Facility: CLINIC | Age: 58
End: 2023-04-03

## 2023-04-03 VITALS
DIASTOLIC BLOOD PRESSURE: 90 MMHG | TEMPERATURE: 97.9 F | WEIGHT: 315 LBS | SYSTOLIC BLOOD PRESSURE: 140 MMHG | HEIGHT: 70 IN | BODY MASS INDEX: 45.1 KG/M2 | HEART RATE: 78 BPM | OXYGEN SATURATION: 98 %

## 2023-04-03 DIAGNOSIS — F33.1 MAJOR DEPRESSIVE DISORDER, RECURRENT EPISODE, MODERATE (HCC): ICD-10-CM

## 2023-04-03 DIAGNOSIS — R73.03 PREDIABETES: ICD-10-CM

## 2023-04-03 DIAGNOSIS — E66.01 MORBID OBESITY (HCC): ICD-10-CM

## 2023-04-03 DIAGNOSIS — U09.9 CHRONIC POST-COVID-19 SYNDROME: Primary | ICD-10-CM

## 2023-04-03 DIAGNOSIS — R03.0 ELEVATED BLOOD PRESSURE READING: ICD-10-CM

## 2023-04-03 DIAGNOSIS — R43.0 ANOSMIA: ICD-10-CM

## 2023-04-03 PROBLEM — G93.32 POST-COVID CHRONIC FATIGUE: Status: ACTIVE | Noted: 2023-04-03

## 2023-04-03 PROBLEM — G93.32 POST-COVID CHRONIC FATIGUE: Status: RESOLVED | Noted: 2023-04-03 | Resolved: 2023-04-03

## 2023-04-03 NOTE — PROGRESS NOTES
Name: Ramona Levin      : [birth year]      MRN: [id number]  Encounter Provider: TONO Brady  Encounter Date: 4/3/2023   Encounter department: [address] [place] [address] MEDICINE    Assessment & Plan     1  Chronic post-COVID-19 syndrome  Assessment & Plan:  Had COVID 2022 and has been experiencing fatigue, anosmia and nerve pain since  Has labs ordered CBC, CMP, TSH already ordered  Will add CRP and ANGELINA to r/o autoimmune etiology  He is on gabapentin 400 mg TID for neuropathy  He reports mylagia no longer talking meloxicam, has hx of arthritis  Educate ibuprofen as needed and will provide referral for pain management  Will f/u with results of labs  Orders:  -     C-reactive protein; Future  -     ANGELINA Screen w/ Reflex to Titer/Pattern; Future  -     Ambulatory Referral to Pain Management; Future    2  Major depressive disorder, recurrent episode, moderate (HCC)  Assessment & Plan: Foll with [address] [facility]  Continue medications as prescribed  3  Morbid obesity ([facility] [address] )  Assessment & Plan:  Discussed healthy eating habits and moderate exercise 150 min/week, labs ordered  4  Anosmia  Assessment & Plan:  Symptoms returned after having COVID for the second time last August  Will refer back to ENT  Orders:  -     Ambulatory Referral to Otolaryngology; Future    5  Elevated blood pressure reading  Assessment & Plan:  BP elevated in office not currently on medication states BP have been normal  Educate to monitor at home over next two weeks and f/u for consistently elevated readings  Recommend low sodium diet and increased exercise  6  Prediabetes  -     HEMOGLOBIN A1C W/ EAG ESTIMATION; Future         Subjective      F/u had COVID last August and still has not regained his smell and taste and continues with body aches, nerve pain and fatigue  He states he tried meloxicam, tizanidine and buspirone that was prescribed however was not effective so stopped medication  Discussed Post-Covid syndrome and encouraged to complete labs ordered  Will provide referral back to ENT and pain management and f/u with results of labs  Encouraged exercise as tolerated  Review of Systems   Constitutional: Positive for fatigue  Negative for activity change, appetite change, chills, diaphoresis, fever and unexpected weight change  HENT: Negative for congestion, dental problem, drooling, ear discharge, ear pain, facial swelling, hearing loss, mouth sores, nosebleeds, postnasal drip, rhinorrhea, sinus pressure, sinus pain, sneezing, sore throat, tinnitus, trouble swallowing and voice change  Eyes: Negative for photophobia, pain, discharge, redness, itching and visual disturbance  Respiratory: Positive for shortness of breath (with activity)  Negative for apnea, cough, choking, chest tightness, wheezing and stridor  Cardiovascular: Negative for chest pain, palpitations and leg swelling  Gastrointestinal: Negative for abdominal distention, abdominal pain, anal bleeding, blood in stool, constipation, diarrhea, nausea and vomiting  Endocrine: Negative for cold intolerance, heat intolerance, polydipsia, polyphagia and polyuria  Genitourinary: Negative for decreased urine volume, difficulty urinating, dysuria, flank pain, frequency, hematuria, penile discharge, scrotal swelling, testicular pain and urgency  Musculoskeletal: Positive for arthralgias and myalgias  Negative for back pain, gait problem, joint swelling, neck pain and neck stiffness  Skin: Negative for color change, rash and wound  Allergic/Immunologic: Negative for environmental allergies, food allergies and immunocompromised state  Neurological: Positive for headaches  Negative for dizziness, tremors, seizures, syncope, facial asymmetry, speech difficulty, weakness, light-headedness and numbness  Hematological: Negative for adenopathy  Does not bruise/bleed easily     Psychiatric/Behavioral: Positive for decreased "concentration and sleep disturbance  Negative for agitation, behavioral problems, confusion, dysphoric mood, hallucinations, self-injury and suicidal ideas  The patient is not nervous/anxious and is not hyperactive  All other systems reviewed and are negative  Current Outpatient Medications on File Prior to Visit   Medication Sig   • gabapentin (NEURONTIN) 400 mg capsule Take 1 capsule (400 mg total) by mouth 3 (three) times a day   • venlafaxine (EFFEXOR-XR) 150 mg 24 hr capsule Take 1 capsule (150 mg total) by mouth daily Take with Effexor XR 75 mg for a total dose of 225 mg daily   • venlafaxine (EFFEXOR-XR) 75 mg 24 hr capsule Take 1 capsule (75 mg total) by mouth daily Take with Effexor  mg for a total dose of 225 mg daily   • zolpidem (AMBIEN) 10 mg tablet Take 1 tablet (10 mg total) by mouth daily at bedtime as needed for sleep Do not start before November 16, 2022  Objective     /90 (BP Location: Right arm, Patient Position: Sitting, Cuff Size: Large)   Pulse 78   Temp 97 9 °F (36 6 °C) (Tympanic)   Ht 5' 10\" (1 778 m)   Wt (!) 144 kg (318 lb)   SpO2 98%   BMI 45 63 kg/m²     Physical Exam  Vitals and nursing note reviewed  Constitutional:       General: He is not in acute distress  Appearance: Normal appearance  He is obese  He is not ill-appearing, toxic-appearing or diaphoretic  HENT:      Head: Normocephalic and atraumatic  Right Ear: Tympanic membrane, ear canal and external ear normal  There is no impacted cerumen  Left Ear: Tympanic membrane, ear canal and external ear normal  There is no impacted cerumen  Nose: Nose normal  No congestion or rhinorrhea  Mouth/Throat:      Mouth: Mucous membranes are moist       Pharynx: No oropharyngeal exudate or posterior oropharyngeal erythema  Eyes:      General:         Right eye: No discharge  Left eye: No discharge  Extraocular Movements: Extraocular movements intact        " Conjunctiva/sclera: Conjunctivae normal       Pupils: Pupils are equal, round, and reactive to light  Neck:      Vascular: No carotid bruit  Cardiovascular:      Rate and Rhythm: Normal rate and regular rhythm  Pulses: Normal pulses  Heart sounds: Normal heart sounds  No murmur heard  Pulmonary:      Effort: Pulmonary effort is normal  No respiratory distress  Breath sounds: Normal breath sounds  No wheezing  Chest:      Chest wall: No tenderness  Abdominal:      General: Bowel sounds are normal  There is no distension  Palpations: Abdomen is soft  There is no mass  Tenderness: There is no abdominal tenderness  There is no right CVA tenderness, left CVA tenderness, guarding or rebound  Hernia: No hernia is present  Musculoskeletal:         General: No swelling or tenderness  Normal range of motion  Cervical back: Normal range of motion  No rigidity or tenderness  Right lower leg: No edema  Left lower leg: No edema  Lymphadenopathy:      Cervical: No cervical adenopathy  Skin:     General: Skin is warm  Capillary Refill: Capillary refill takes less than 2 seconds  Coloration: Skin is not jaundiced  Findings: No bruising, erythema or rash  Neurological:      General: No focal deficit present  Mental Status: He is alert and oriented to person, place, and time  Cranial Nerves: No cranial nerve deficit  Sensory: No sensory deficit  Coordination: Coordination normal    Psychiatric:         Attention and Perception: Attention and perception normal          Mood and Affect: Mood and affect normal  Mood is not anxious  Speech: Speech normal          Behavior: Behavior normal  Behavior is cooperative  Thought Content: Thought content normal  Thought content is not paranoid  Thought content does not include homicidal or suicidal ideation  Thought content does not include homicidal or suicidal plan           Cognition and Memory: Cognition and memory normal        TONO Lilly

## 2023-04-03 NOTE — ASSESSMENT & PLAN NOTE
Had COVID August 2022 and has been experiencing fatigue, anosmia and nerve pain since  Has labs ordered CBC, CMP, TSH already ordered  Will add CRP and ANGELINA to r/o autoimmune etiology  He is on gabapentin 400 mg TID for neuropathy  He reports mylagia no longer talking meloxicam, has hx of arthritis  Educate ibuprofen as needed and will provide referral for pain management  Will f/u with results of labs

## 2023-04-03 NOTE — ASSESSMENT & PLAN NOTE
BP elevated in office not currently on medication states BP have been normal  Educate to monitor at home over next two weeks and f/u for consistently elevated readings  Recommend low sodium diet and increased exercise

## 2023-04-04 ENCOUNTER — APPOINTMENT (OUTPATIENT)
Dept: LAB | Facility: AMBULARY SURGERY CENTER | Age: 58
End: 2023-04-04

## 2023-04-04 DIAGNOSIS — Z11.4 SCREENING FOR HIV (HUMAN IMMUNODEFICIENCY VIRUS): ICD-10-CM

## 2023-04-04 DIAGNOSIS — Z11.59 NEED FOR HEPATITIS C SCREENING TEST: ICD-10-CM

## 2023-04-04 DIAGNOSIS — Z12.5 PROSTATE CANCER SCREENING: ICD-10-CM

## 2023-04-04 DIAGNOSIS — E66.01 MORBID OBESITY (HCC): ICD-10-CM

## 2023-04-04 DIAGNOSIS — R73.03 PREDIABETES: ICD-10-CM

## 2023-04-04 DIAGNOSIS — Z13.1 SCREENING FOR DIABETES MELLITUS: ICD-10-CM

## 2023-04-04 DIAGNOSIS — U09.9 CHRONIC POST-COVID-19 SYNDROME: ICD-10-CM

## 2023-04-04 DIAGNOSIS — Z13.6 SCREENING FOR CARDIOVASCULAR CONDITION: ICD-10-CM

## 2023-04-04 DIAGNOSIS — R43.9 DYSOSMIA: ICD-10-CM

## 2023-04-04 DIAGNOSIS — R43.2 DYSGEUSIA: ICD-10-CM

## 2023-04-04 DIAGNOSIS — M79.10 MYALGIA DUE TO VIRAL INFECTION: ICD-10-CM

## 2023-04-04 DIAGNOSIS — B97.89 MYALGIA DUE TO VIRAL INFECTION: ICD-10-CM

## 2023-04-04 LAB
ALBUMIN SERPL BCP-MCNC: 3.5 G/DL (ref 3.5–5)
ALP SERPL-CCNC: 62 U/L (ref 46–116)
ALT SERPL W P-5'-P-CCNC: 28 U/L (ref 12–78)
ANA SER QL IA: NEGATIVE
ANION GAP SERPL CALCULATED.3IONS-SCNC: 1 MMOL/L (ref 4–13)
AST SERPL W P-5'-P-CCNC: 15 U/L (ref 5–45)
BASOPHILS # BLD AUTO: 0.05 THOUSANDS/ÂΜL (ref 0–0.1)
BASOPHILS NFR BLD AUTO: 1 % (ref 0–1)
BILIRUB SERPL-MCNC: 0.87 MG/DL (ref 0.2–1)
BUN SERPL-MCNC: 18 MG/DL (ref 5–25)
CALCIUM SERPL-MCNC: 8.5 MG/DL (ref 8.3–10.1)
CHLORIDE SERPL-SCNC: 110 MMOL/L (ref 96–108)
CHOLEST SERPL-MCNC: 159 MG/DL
CO2 SERPL-SCNC: 26 MMOL/L (ref 21–32)
CREAT SERPL-MCNC: 1.11 MG/DL (ref 0.6–1.3)
CRP SERPL QL: 3.8 MG/L
EOSINOPHIL # BLD AUTO: 0.15 THOUSAND/ÂΜL (ref 0–0.61)
EOSINOPHIL NFR BLD AUTO: 2 % (ref 0–6)
ERYTHROCYTE [DISTWIDTH] IN BLOOD BY AUTOMATED COUNT: 13.1 % (ref 11.6–15.1)
EST. AVERAGE GLUCOSE BLD GHB EST-MCNC: 123 MG/DL
GFR SERPL CREATININE-BSD FRML MDRD: 73 ML/MIN/1.73SQ M
GLUCOSE P FAST SERPL-MCNC: 116 MG/DL (ref 65–99)
HBA1C MFR BLD: 5.9 %
HCT VFR BLD AUTO: 43.2 % (ref 36.5–49.3)
HCV AB SER QL: NORMAL
HDLC SERPL-MCNC: 32 MG/DL
HGB BLD-MCNC: 14.7 G/DL (ref 12–17)
HIV 1+2 AB+HIV1 P24 AG SERPL QL IA: NORMAL
HIV 2 AB SERPL QL IA: NORMAL
HIV1 AB SERPL QL IA: NORMAL
HIV1 P24 AG SERPL QL IA: NORMAL
IMM GRANULOCYTES # BLD AUTO: 0.04 THOUSAND/UL (ref 0–0.2)
IMM GRANULOCYTES NFR BLD AUTO: 1 % (ref 0–2)
LDLC SERPL CALC-MCNC: 100 MG/DL (ref 0–100)
LYMPHOCYTES # BLD AUTO: 1.95 THOUSANDS/ÂΜL (ref 0.6–4.47)
LYMPHOCYTES NFR BLD AUTO: 29 % (ref 14–44)
MCH RBC QN AUTO: 29.3 PG (ref 26.8–34.3)
MCHC RBC AUTO-ENTMCNC: 34 G/DL (ref 31.4–37.4)
MCV RBC AUTO: 86 FL (ref 82–98)
MONOCYTES # BLD AUTO: 0.62 THOUSAND/ÂΜL (ref 0.17–1.22)
MONOCYTES NFR BLD AUTO: 9 % (ref 4–12)
NEUTROPHILS # BLD AUTO: 3.94 THOUSANDS/ÂΜL (ref 1.85–7.62)
NEUTS SEG NFR BLD AUTO: 58 % (ref 43–75)
NONHDLC SERPL-MCNC: 127 MG/DL
NRBC BLD AUTO-RTO: 0 /100 WBCS
PLATELET # BLD AUTO: 263 THOUSANDS/UL (ref 149–390)
PMV BLD AUTO: 9.6 FL (ref 8.9–12.7)
POTASSIUM SERPL-SCNC: 3.9 MMOL/L (ref 3.5–5.3)
PROT SERPL-MCNC: 6.7 G/DL (ref 6.4–8.4)
RBC # BLD AUTO: 5.01 MILLION/UL (ref 3.88–5.62)
SODIUM SERPL-SCNC: 137 MMOL/L (ref 135–147)
TRIGL SERPL-MCNC: 134 MG/DL
TSH SERPL DL<=0.05 MIU/L-ACNC: 1.77 UIU/ML (ref 0.45–4.5)
WBC # BLD AUTO: 6.75 THOUSAND/UL (ref 4.31–10.16)

## 2023-04-05 ENCOUNTER — TELEPHONE (OUTPATIENT)
Dept: FAMILY MEDICINE CLINIC | Facility: CLINIC | Age: 58
End: 2023-04-05

## 2023-04-05 LAB
PSA FREE MFR SERPL: 12.7 %
PSA FREE SERPL-MCNC: 0.28 NG/ML
PSA SERPL-MCNC: 2.2 NG/ML (ref 0–4)

## 2023-04-05 NOTE — TELEPHONE ENCOUNTER
Call pt  I reviewed his labs and nothing is that bad  His CRP is only mildly elevated and is lower than it was a few years ago  The anion gap is not anything to be concerned about  It fluctuates a lot   Recheck BMP in 1 month

## 2023-04-05 NOTE — TELEPHONE ENCOUNTER
Dr Pablito Ponce patient wants to know if you can look at his labs and advise if there is anything to worry about  He's concerned about the c-reactive protein and the angio gap

## 2023-04-06 DIAGNOSIS — R73.09 ELEVATED GLUCOSE: Primary | ICD-10-CM

## 2023-06-20 ENCOUNTER — RA CDI HCC (OUTPATIENT)
Dept: OTHER | Facility: HOSPITAL | Age: 58
End: 2023-06-20

## 2023-06-20 NOTE — PROGRESS NOTES
[address]  coding opportunities       Chart reviewed, no opportunity found: CHART REVIEWED, NO OPPORTUNITY FOUND        Patients Insurance     Medicare Insurance: Medicare

## 2023-06-25 PROBLEM — R03.0 ELEVATED BLOOD PRESSURE READING: Status: RESOLVED | Noted: 2023-04-03 | Resolved: 2023-06-25

## 2023-06-25 PROBLEM — L98.9 SKIN LESION: Status: RESOLVED | Noted: 2021-05-13 | Resolved: 2023-06-25

## 2023-06-26 ENCOUNTER — OFFICE VISIT (OUTPATIENT)
Dept: FAMILY MEDICINE CLINIC | Facility: CLINIC | Age: 58
End: 2023-06-26
Payer: MEDICARE

## 2023-06-26 VITALS
HEIGHT: 70 IN | RESPIRATION RATE: 20 BRPM | DIASTOLIC BLOOD PRESSURE: 84 MMHG | BODY MASS INDEX: 45.1 KG/M2 | HEART RATE: 90 BPM | TEMPERATURE: 97.9 F | WEIGHT: 315 LBS | OXYGEN SATURATION: 97 % | SYSTOLIC BLOOD PRESSURE: 128 MMHG

## 2023-06-26 DIAGNOSIS — R73.03 PREDIABETES: ICD-10-CM

## 2023-06-26 DIAGNOSIS — R51.9 CHRONIC NONINTRACTABLE HEADACHE, UNSPECIFIED HEADACHE TYPE: ICD-10-CM

## 2023-06-26 DIAGNOSIS — G89.29 CHRONIC NONINTRACTABLE HEADACHE, UNSPECIFIED HEADACHE TYPE: ICD-10-CM

## 2023-06-26 DIAGNOSIS — R53.82 CHRONIC FATIGUE: ICD-10-CM

## 2023-06-26 DIAGNOSIS — E55.9 VITAMIN D DEFICIENCY: ICD-10-CM

## 2023-06-26 DIAGNOSIS — E66.01 MORBID OBESITY (HCC): ICD-10-CM

## 2023-06-26 DIAGNOSIS — Z00.00 MEDICARE ANNUAL WELLNESS VISIT, SUBSEQUENT: Primary | ICD-10-CM

## 2023-06-26 DIAGNOSIS — R43.0 ANOSMIA: ICD-10-CM

## 2023-06-26 DIAGNOSIS — F33.1 MAJOR DEPRESSIVE DISORDER, RECURRENT EPISODE, MODERATE (HCC): Chronic | ICD-10-CM

## 2023-06-26 PROCEDURE — 99214 OFFICE O/P EST MOD 30 MIN: CPT | Performed by: FAMILY MEDICINE

## 2023-06-26 PROCEDURE — G0439 PPPS, SUBSEQ VISIT: HCPCS | Performed by: FAMILY MEDICINE

## 2023-06-26 NOTE — PATIENT INSTRUCTIONS
Medicare Preventive Visit Patient Instructions  Thank you for completing your Welcome to Medicare Visit or Medicare Annual Wellness Visit today  Your next wellness visit will be due in one year (6/26/2024)  The screening/preventive services that you may require over the next 5-10 years are detailed below  Some tests may not apply to you based off risk factors and/or age  Screening tests ordered at today's visit but not completed yet may show as past due  Also, please note that scanned in results may not display below  Preventive Screenings:  Service Recommendations Previous Testing/Comments   Colorectal Cancer Screening  · Colonoscopy    · Fecal Occult Blood Test (FOBT)/Fecal Immunochemical Test (FIT)  · Fecal DNA/Cologuard Test  · Flexible Sigmoidoscopy Age: 39-70 years old   Colonoscopy: every 10 years (May be performed more frequently if at higher risk)  OR  FOBT/FIT: every 1 year  OR  Cologuard: every 3 years  OR  Sigmoidoscopy: every 5 years  Screening may be recommended earlier than age 39 if at higher risk for colorectal cancer  Also, an individualized decision between you and your healthcare provider will decide whether screening between the ages of 74-80 would be appropriate   Colonoscopy: 11/07/2019  FOBT/FIT: Not on file  Cologuard: Not on file  Sigmoidoscopy: Not on file    Screening Current     Prostate Cancer Screening Individualized decision between patient and health care provider in men between ages of 53-78   Medicare will cover every 12 months beginning on the day after your 50th birthday PSA: 2 2 ng/mL     Screening Current     Hepatitis C Screening Once for adults born between 1945 and 1965  More frequently in patients at high risk for Hepatitis C Hep C Antibody: 04/04/2023    Screening Current   Diabetes Screening 1-2 times per year if you're at risk for diabetes or have pre-diabetes Fasting glucose: 116 mg/dL (4/4/2023)  A1C: 5 9 % (4/4/2023)  Screening Current   Cholesterol Screening Once every 5 years if you don't have a lipid disorder  May order more often based on risk factors  Lipid panel: 04/04/2023  Screening Current      Other Preventive Screenings Covered by Medicare:  1  Abdominal Aortic Aneurysm (AAA) Screening: covered once if your at risk  You're considered to be at risk if you have a family history of AAA or a male between the age of 73-68 who smoking at least 100 cigarettes in your lifetime  2  Lung Cancer Screening: covers low dose CT scan once per year if you meet all of the following conditions: (1) Age 50-69; (2) No signs or symptoms of lung cancer; (3) Current smoker or have quit smoking within the last 15 years; (4) You have a tobacco smoking history of at least 20 pack years (packs per day x number of years you smoked); (5) You get a written order from a healthcare provider  3  Glaucoma Screening: covered annually if you're considered high risk: (1) You have diabetes OR (2) Family history of glaucoma OR (3)  aged 48 and older OR (3)  American aged 72 and older  3  Osteoporosis Screening: covered every 2 years if you meet one of the following conditions: (1) Have a vertebral abnormality; (2) On glucocorticoid therapy for more than 3 months; (3) Have primary hyperparathyroidism; (4) On osteoporosis medications and need to assess response to drug therapy  5  HIV Screening: covered annually if you're between the age of 12-76  Also covered annually if you are younger than 13 and older than 72 with risk factors for HIV infection  For pregnant patients, it is covered up to 3 times per pregnancy      Immunizations:  Immunization Recommendations   Influenza Vaccine Annual influenza vaccination during flu season is recommended for all persons aged >= 6 months who do not have contraindications   Pneumococcal Vaccine   * Pneumococcal conjugate vaccine = PCV13 (Prevnar 13), PCV15 (Vaxneuvance), PCV20 (Prevnar 20)  * Pneumococcal polysaccharide vaccine = PPSV23 (Pneumovax) Adults 2364 years old: 1-3 doses may be recommended based on certain risk factors  Adults 72 years old: 1-2 doses may be recommended based off what pneumonia vaccine you previously received   Hepatitis B Vaccine 3 dose series if at intermediate or high risk (ex: diabetes, end stage renal disease, liver disease)   Tetanus (Td) Vaccine - COST NOT COVERED BY MEDICARE PART B Following completion of primary series, a booster dose should be given every 10 years to maintain immunity against tetanus  Td may also be given as tetanus wound prophylaxis  Tdap Vaccine - COST NOT COVERED BY MEDICARE PART B Recommended at least once for all adults  For pregnant patients, recommended with each pregnancy  Shingles Vaccine (Shingrix) - COST NOT COVERED BY MEDICARE PART B  2 shot series recommended in those aged 48 and above     Health Maintenance Due:      Topic Date Due   • Colorectal Cancer Screening  11/07/2024   • HIV Screening  Completed   • Hepatitis C Screening  Completed     Immunizations Due:      Topic Date Due   • COVID-19 Vaccine (3 - Pfizer series) 06/04/2021   • Influenza Vaccine (Season Ended) 09/01/2023     Advance Directives   What are advance directives? Advance directives are legal documents that state your wishes and plans for medical care  These plans are made ahead of time in case you lose your ability to make decisions for yourself  Advance directives can apply to any medical decision, such as the treatments you want, and if you want to donate organs  What are the types of advance directives? There are many types of advance directives, and each state has rules about how to use them  You may choose a combination of any of the following:  · Living will: This is a written record of the treatment you want  You can also choose which treatments you do not want, which to limit, and which to stop at a certain time  This includes surgery, medicine, IV fluid, and tube feedings     · Durable power of  for [facility]): This is a written record that states who you want to make healthcare choices for you when you are unable to make them for yourself  This person, called a proxy, is usually a family member or a friend  You may choose more than 1 proxy  · Do not resuscitate (DNR) order:  A DNR order is used in case your heart stops beating or you stop breathing  It is a request not to have certain forms of treatment, such as CPR  A DNR order may be included in other types of advance directives  · Medical directive: This covers the care that you want if you are in a coma, near death, or unable to make decisions for yourself  You can list the treatments you want for each condition  Treatment may include pain medicine, surgery, blood transfusions, dialysis, IV or tube feedings, and a ventilator (breathing machine)  · Values history: This document has questions about your views, beliefs, and how you feel and think about life  This information can help others choose the care that you would choose  Why are advance directives important? An advance directive helps you control your care  Although spoken wishes may be used, it is better to have your wishes written down  Spoken wishes can be misunderstood, or not followed  Treatments may be given even if you do not want them  An advance directive may make it easier for your family to make difficult choices about your care  Weight Management   Why it is important to manage your weight:  Being overweight increases your risk of health conditions such as heart disease, high blood pressure, type 2 diabetes, and certain types of cancer  It can also increase your risk for osteoarthritis, sleep apnea, and other respiratory problems  Aim for a slow, steady weight loss  Even a small amount of weight loss can lower your risk of health problems  How to lose weight safely:  A safe and healthy way to lose weight is to eat fewer calories and get regular exercise   You can lose up about 1 pound a week by decreasing the number of calories you eat by 500 calories each day  Healthy meal plan for weight management:  A healthy meal plan includes a variety of foods, contains fewer calories, and helps you stay healthy  A healthy meal plan includes the following:  · Eat whole-grain foods more often  A healthy meal plan should contain fiber  Fiber is the part of grains, fruits, and vegetables that is not broken down by your body  Whole-grain foods are healthy and provide extra fiber in your diet  Some examples of whole-grain foods are whole-wheat breads and pastas, oatmeal, brown rice, and bulgur  · Eat a variety of vegetables every day  Include dark, leafy greens such as spinach, kale, mahesh greens, and mustard greens  Eat yellow and orange vegetables such as carrots, sweet potatoes, and winter squash  · Eat a variety of fruits every day  Choose fresh or canned fruit (canned in its own juice or light syrup) instead of juice  Fruit juice has very little or no fiber  · Eat low-fat dairy foods  Drink fat-free (skim) milk or 1% milk  Eat fat-free yogurt and low-fat cottage cheese  Try low-fat cheeses such as mozzarella and other reduced-fat cheeses  · Choose meat and other protein foods that are low in fat  Choose beans or other legumes such as split peas or lentils  Choose fish, skinless poultry (chicken or turkey), or lean cuts of red meat (beef or pork)  Before you cook meat or poultry, cut off any visible fat  · Use less fat and oil  Try baking foods instead of frying them  Add less fat, such as margarine, sour cream, regular salad dressing and mayonnaise to foods  Eat fewer high-fat foods  Some examples of high-fat foods include french fries, doughnuts, ice cream, and cakes  · Eat fewer sweets  Limit foods and drinks that are high in sugar  This includes candy, cookies, regular soda, and sweetened drinks    Exercise:  Exercise at least 30 minutes per day on most days of the week  Some examples of exercise include walking, biking, dancing, and swimming  You can also fit in more physical activity by taking the stairs instead of the elevator or parking farther away from stores  Ask your healthcare provider about the best exercise plan for you  © Copyright [employer] 2018 Information is for End User's use only and may not be sold, redistributed or otherwise used for commercial purposes   All illustrations and images included in CareNotes® are the copyrighted property of [employer]  or [address] [place] [address] [employer]

## 2023-06-26 NOTE — ASSESSMENT & PLAN NOTE
Patient has had it since had COVID last year  Patient told to increase exercise and weight loss   Patient also told to take MVI, Vit C, Vit D, and Zinc

## 2023-06-26 NOTE — ASSESSMENT & PLAN NOTE
Wellness exam done  Had COVID vaccines  Recommend Tdap, shingrix, and yearly flu shot in the fall  Labs are UTD  Had colonoscopy in Nov 2019 by Dr Ludmila Hoffman and patient had 1 polyp  No recent falls  Mood ok

## 2023-06-26 NOTE — ASSESSMENT & PLAN NOTE
Gets them for days in a row and then none for a few weeks  Started after had COVID last year  Will refer to Neurology for evaluation and management

## 2023-06-26 NOTE — PROGRESS NOTES
Assessment and Plan:     Problem List Items Addressed This Visit        Other    Major depressive disorder, recurrent episode, moderate (HCC) (Chronic)     Stable  Continue medications and management per Psychiatry  Vitamin D deficiency     Recheck Vit D level  Not on medication at present  Relevant Orders    Vitamin D 25 hydroxy    Prediabetes     A1C 5 9 in April 2023  Advised pt to follow a low carb diet and to exercise on a regular basis  Morbid obesity ([address] )     Discussed smaller portions, healthy snacks, and regular exercise  Medicare annual wellness visit, subsequent - Primary     Wellness exam done  Had COVID vaccines  Recommend Tdap, shingrix, and yearly flu shot in the fall  Labs are UTD  Had colonoscopy in Nov 2019 by Dr Alonzo Spangler and patient had 1 polyp  No recent falls  Mood ok  Chronic headaches     Gets them for days in a row and then none for a few weeks  Started after had COVID last year  Will refer to Neurology for evaluation and management  Relevant Orders    Ambulatory Referral to Neurology    Chronic fatigue     Patient has had it since had COVID last year  Patient told to increase exercise and weight loss  Patient also told to take MVI, Vit C, Vit D, and Zinc           Anosmia     Has had it since had COVID last year  Will refer to ENT  Relevant Orders    Ambulatory Referral to Otolaryngology     BMI Counseling: Body mass index is 45 77 kg/m²  The BMI is above normal  Nutrition recommendations include decreasing portion sizes, encouraging healthy choices of fruits and vegetables, consuming healthier snacks and moderation in carbohydrate intake  Exercise recommendations include exercising 3-5 times per week  No pharmacotherapy was ordered  Rationale for BMI follow-up plan is due to patient being overweight or obese         Preventive health issues were discussed with patient, and age appropriate screening tests were ordered as noted in patient's After Visit Summary  Personalized health advice and appropriate referrals for health education or preventive services given if needed, as noted in patient's After Visit Summary  History of Present Illness:     Patient presents for a Medicare Wellness Visit    Patient here for wellness exam  Doing ok  No chest pain or shortness of breath  Patient has had fatigue, decreased taste and smell, headaches, sweating, abdominal issues  Patient has had these symptoms since had COVID in May 2022  Has brain fog as well  Thinks he has long COVID  Patient Care Team:  Ying Pond MD as PCP - Severiano Curl, MD Nancey Falter, MD Doria Lobos, DO Berna Pique, MD Jarold Almas, Owens Dance, MD Vivianne Eaton, MD Art Camarena MD as Endoscopist     Review of Systems:     Review of Systems   Constitutional: Positive for fatigue  Negative for unexpected weight change  HENT:        Decreased taste and smell   Respiratory: Negative for cough and shortness of breath  Cardiovascular: Negative for chest pain  Gastrointestinal: Negative for abdominal pain, constipation, diarrhea and vomiting  Endocrine:        Sweating   Musculoskeletal: Negative for arthralgias  Neurological: Positive for headaches  Negative for dizziness  Psychiatric/Behavioral: Positive for decreased concentration  Negative for dysphoric mood  The patient is not nervous/anxious           Problem List:     Patient Active Problem List   Diagnosis   • Major depressive disorder, recurrent episode, moderate (HCC)   • ALYSHA (generalized anxiety disorder)   • Other insomnia   • Adrenal adenoma   • Chronic right-sided thoracic back pain   • Cubital tunnel syndrome of both upper extremities   • CMC arthritis, thumb, degenerative   • Intercostal neuralgia   • Low back pain   • Morbid obesity (HCC)   • Myofascial pain syndrome   • Sleep apnea   • Thoracic compression fracture ([facility])   • Tinea capitis   • Ulnar 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 of left upper extremity   • Vitamin D deficiency   • Neuropathy   • Tension headache   • Prediabetes   • Medicare annual wellness visit, subsequent   • Anosmia   • Arthritis of right knee   • Chronic post-COVID-19 syndrome   • Chronic headaches   • Chronic fatigue      Past Medical and Surgical History:     Past Medical History:   Diagnosis Date   • Adrenal adenoma    • Arthritis    • Carpal tunnel syndrome    • Chronic back pain    • Chronic pain disorder    • COVID    • CPAP (continuous positive airway pressure) dependence    • Cubital tunnel syndrome    • Depression    • Fatigue    • Fatty liver    • Folliculitis    • Hemorrhoid    • Liver lesion    • Lumbago    • Morbid obesity (HCC)    • Myofascial pain syndrome    • Neuropathy    • Obesity 2015   • Scalp lesion    • Sleep apnea     CPAP dependance   • Sleep difficulties    • Thickening of wall of gallbladder    • Tinea capitis    • Ulnar 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    • Umbilical hernia    • Vitamin D deficiency      Past Surgical History:   Procedure Laterality Date   • CARPAL TUNNEL RELEASE Bilateral    • CHOLECYSTECTOMY     • COLONOSCOPY     • CYST REMOVAL      Scalp   • EGD     • EYE MUSCLE SURGERY Bilateral     as a child for amblyopia   • HAND SURGERY     • HEMORROIDECTOMY     • HERNIA REPAIR     • KNEE ARTHROSCOPY Right    • PATELLA SURGERY Right     scraped out scar tissue per pt    • OH COLONOSCOPY FLX DX W/COLLJ SPEC WHEN PFRMD N/A 2/10/2016    Procedure: COLONOSCOPY;  Surgeon: Heidy Ruiz MD;  [facility]: [facility]; Service: Colorectal   • OH EGD TRANSORAL BIOPSY SINGLE/MULTIPLE N/A 8/23/2017    Procedure: ESOPHAGOGASTRODUODENOSCOPY (EGD) with bx;  Surgeon: Debra Smith MD;  Location: [facility]; Service: Bariatrics   • OH EXC B9 LESION MRGN XCP SK TG S/N/H/F/G 0 6-1  0CM N/A 12/8/2016    Procedure: EXCISION OF SCALP LESION;  Surgeon: Enmanuel Santo MD;  [facility];  Service: General   • OH HEMORRHOIDECTOMY INT & XTRNL 2/> COLUMN/MICHAELLE N/A 2/12/2016    Procedure: HEMORRHOIDECTOMY EXCISION, EUA, Anal fistulotomy;  Surgeon: Gt Schuster MD;  [facility];  Service: Colorectal   • IA LAPAROSCOPY SURG CHOLECYSTECTOMY N/A 3/11/2021    Procedure: CHOLECYSTECTOMY LAPAROSCOPIC;  Surgeon: Jose Manuel Stokes MD;  Location: [facility];  Service: General   • IA RPR UMBILICAL HRNA 5 YRS/> REDUCIBLE N/A 3/11/2021    Procedure: REPAIR HERNIA UMBILICAL RECURRENT;  Surgeon: Jose Manuel Stokes MD;  [facility];  Service: General   • UMBILICAL HERNIA REPAIR        Family History:     Family History   Problem Relation Age of Onset   • Cancer Mother    • Diabetes type II Father    • Cancer Father    • Depression Sister    • Suicide Attempts Sister    • No Known Problems Sister    • Substance Abuse Neg Hx       Social History:     Social History     Socioeconomic History   • Marital status: Legally      Spouse name: None   • Number of children: 1   • Years of education: some college   • Highest education level: Some college, no degree   Occupational History   • Occupation: on disability, works part time in food delivery   Tobacco Use   • Smoking status: Never   • Smokeless tobacco: Never   Vaping Use   • Vaping Use: Never used   Substance and Sexual Activity   • Alcohol use: Not Currently     Comment: Social alcohol use   • Drug use: No   • Sexual activity: Yes     Partners: Female     Birth control/protection: Condom Male   Other Topics Concern   • None   Social History Narrative    Education: some college    Learning Disabilities: none    Marital History:     Children: 1 adult son    Living Arrangement: lives in home with parents    Occupational History: on disability, employed part time in food delivery, 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    Functioning Relationships: good support system    Legal History: none     History: None        Most recent tobacco use screenin2018     Social Determinants of Health     Financial Resource Strain: Medium Risk (2023)    Overall Financial Resource Strain (CARDIA)    • Difficulty of Paying Living Expenses: Somewhat hard   Food Insecurity: No Food Insecurity (2022)    Hunger Vital Sign    • Worried About Running Out of Food in the Last Year: Never true    • Ran Out of Food in the Last Year: Never true   Transportation Needs: No Transportation Needs (2023)    PRAPARE - Transportation    • Lack of Transportation (Medical): No    • Lack of Transportation (Non-Medical): No   Physical Activity: Inactive (2022)    Exercise Vital Sign    • Days of Exercise per Week: 0 days    • Minutes of Exercise per Session: 0 min   Stress: Stress Concern Present (2022)    [address]    • Feeling of Stress :  To some extent   Social Connections: Socially Isolated (2022)    Social Connection and Isolation Panel [NHANES]    • Frequency of Communication with Friends and Family: More than three times a week    • Frequency of Social Gatherings with Friends and Family: Twice a week    • Attends Adventism Services: Never    • Active Member of Clubs or Organizations: No    • Attends Club or Organization Meetings: Never    • Marital Status:    Intimate Partner Violence: Not At Risk (2022)    Humiliation, Afraid, Rape, and Kick questionnaire    • Fear of Current or Ex-Partner: No    • Emotionally Abused: No    • Physically Abused: No    • Sexually Abused: No   Housing Stability: Low Risk  (2022)    Housing Stability Vital Sign    • Unable to Pay for Housing in the Last Year: No    • Number of [place] in the Last Year: 1    • Unstable Housing in the Last Year: No      Medications and Allergies:     Current Outpatient Medications   Medication Sig Dispense Refill   • gabapentin (NEURONTIN) 400 mg capsule Take 1 capsule (400 mg total) by mouth 3 (three) times a day 90 capsule 4   • venlafaxine (EFFEXOR-XR) 150 mg 24 hr capsule Take 1 capsule (150 mg total) by mouth daily Take with Effexor XR 75 mg for a total dose of 225 mg daily 30 capsule 4   • venlafaxine (EFFEXOR-XR) 75 mg 24 hr capsule TAKE 1 CAPSULE (75 MG TOTAL) BY MOUTH DAILY TAKE WITH EFFEXOR  MG FOR A TOTAL DOSE  MG DAILY 30 capsule 4   • zolpidem (AMBIEN) 10 mg tablet Take 1 tablet (10 mg total) by mouth daily at bedtime as needed for sleep 30 tablet 4     No current facility-administered medications for this visit  Allergies   Allergen Reactions   • Penicillins Rash      Immunizations:     Immunization History   Administered Date(s) Administered   • COVID-19 PFIZER VACCINE 0 3 ML IM 03/19/2021, 04/09/2021      Health Maintenance:         Topic Date Due   • Colorectal Cancer Screening  11/07/2024   • HIV Screening  Completed   • Hepatitis C Screening  Completed         Topic Date Due   • COVID-19 Vaccine (3 - Pfizer series) 06/04/2021   • Influenza Vaccine (Season Ended) 09/01/2023      Medicare Screening Tests and Risk Assessments:     Suly Chávez is here for his Subsequent Wellness visit  Health Risk Assessment:   Patient rates overall health as poor  Patient feels that their physical health rating is slightly worse  Patient is very dissatisfied with their life  Eyesight was rated as slightly worse  Hearing was rated as slightly worse  Patient feels that their emotional and mental health rating is slightly worse  Patients states they are never, rarely angry  Patient states they are always unusually tired/fatigued  Pain experienced in the last 7 days has been some  Patient's pain rating has been 4/10  headache    Depression Screening:   PHQ-9 Score: 13      Fall Risk Screening: In the past year, patient has experienced: no history of falling in past year      Home Safety:  Patient has trouble with stairs inside or outside of their home   Patient has working smoke alarms and has working carbon monoxide detector  Home safety hazards include: none  Nutrition:   Current diet is Regular  Medications:   Patient is not currently taking any over-the-counter supplements  Patient is able to manage medications  Activities of Daily Living (ADLs)/Instrumental Activities of Daily Living (IADLs):   Walk and transfer into and out of bed and chair?: Yes  Dress and groom yourself?: Yes    Bathe or shower yourself?: Yes    Feed yourself? Yes  Do your laundry/housekeeping?: Yes  Manage your money, pay your bills and track your expenses?: Yes  Make your own meals?: Yes    Do your own shopping?: Yes    Previous Hospitalizations:   Any hospitalizations or ED visits within the last 12 months?: No      Advance Care Planning:   Living will: No    Durable POA for healthcare: No    Advanced directive: Yes    Advanced directive counseling given: Yes    Five wishes given: Yes    Patient declined ACP directive: No      Cognitive Screening:   Provider or family/friend/caregiver concerned regarding cognition?: No    PREVENTIVE SCREENINGS      Cardiovascular Screening:    General: Screening Current      Diabetes Screening:     General: Screening Current      Colorectal Cancer Screening:     General: Screening Current      Prostate Cancer Screening:    General: Screening Current      Osteoporosis Screening:    General: Screening Not Indicated      Abdominal Aortic Aneurysm (AAA) Screening:        General: Screening Not Indicated      Lung Cancer Screening:     General: Screening Not Indicated      Hepatitis C Screening:    General: Screening Current    Screening, Brief Intervention, and Referral to Treatment (SBIRT)    Screening  Typical number of drinks in a day: 0  Typical number of drinks in a week: 0  Interpretation: Low risk drinking behavior      AUDIT-C Screenin) How often did you have a drink containing alcohol in the past year? never  2) How many drinks did you have on a typical day "when you were drinking in the past year? 0  3) How often did you have 6 or more drinks on one occasion in the past year? never    AUDIT-C Score: 0  Interpretation: Score 0-3 (male): Negative screen for alcohol misuse    Single Item Drug Screening:  How often have you used an illegal drug (including marijuana) or a prescription medication for non-medical reasons in the past year? never    Single Item Drug Screen Score: 0  Interpretation: Negative screen for possible drug use disorder    Brief Intervention  Alcohol & drug use screenings were reviewed  No concerns regarding substance use disorder identified  No results found  Physical Exam:     /84 (BP Location: Left arm, Patient Position: Sitting, Cuff Size: Large)   Pulse 90   Temp 97 9 °F (36 6 °C) (Tympanic)   Resp 20   Ht 5' 10\" (1 778 m)   Wt (!) 145 kg (319 lb)   SpO2 97%   BMI 45 77 kg/m²     Physical Exam  Vitals and nursing note reviewed  Constitutional:       Appearance: Normal appearance  He is obese  Neck:      Vascular: No carotid bruit  Cardiovascular:      Rate and Rhythm: Normal rate and regular rhythm  Heart sounds: Normal heart sounds  No murmur heard  Pulmonary:      Effort: Pulmonary effort is normal       Breath sounds: Normal breath sounds  No wheezing  Musculoskeletal:      Cervical back: Normal range of motion and neck supple  No muscular tenderness  Right lower leg: No edema  Left lower leg: No edema  Lymphadenopathy:      Cervical: No cervical adenopathy  Neurological:      Mental Status: He is alert  Psychiatric:         Mood and Affect: Mood normal          Behavior: Behavior normal          Thought Content:  Thought content normal          Judgment: Judgment normal           Jenni Lombardo MD  "

## 2023-08-21 ENCOUNTER — OFFICE VISIT (OUTPATIENT)
Dept: SLEEP CENTER | Facility: CLINIC | Age: 58
End: 2023-08-21
Payer: MEDICARE

## 2023-08-21 VITALS
DIASTOLIC BLOOD PRESSURE: 82 MMHG | SYSTOLIC BLOOD PRESSURE: 132 MMHG | HEIGHT: 70 IN | WEIGHT: 299 LBS | BODY MASS INDEX: 42.8 KG/M2

## 2023-08-21 DIAGNOSIS — E66.01 MORBID OBESITY (HCC): ICD-10-CM

## 2023-08-21 DIAGNOSIS — G47.09 OTHER INSOMNIA: ICD-10-CM

## 2023-08-21 DIAGNOSIS — G44.209 TENSION HEADACHE: ICD-10-CM

## 2023-08-21 DIAGNOSIS — G47.33 OSA (OBSTRUCTIVE SLEEP APNEA): Primary | ICD-10-CM

## 2023-08-21 DIAGNOSIS — F41.9 ANXIETY AND DEPRESSION: ICD-10-CM

## 2023-08-21 DIAGNOSIS — M79.18 MYOFASCIAL PAIN SYNDROME: ICD-10-CM

## 2023-08-21 DIAGNOSIS — F32.A ANXIETY AND DEPRESSION: ICD-10-CM

## 2023-08-21 DIAGNOSIS — F43.9 STRESS AT HOME: ICD-10-CM

## 2023-08-21 PROCEDURE — 99214 OFFICE O/P EST MOD 30 MIN: CPT | Performed by: INTERNAL MEDICINE

## 2023-08-21 NOTE — PATIENT INSTRUCTIONS
What you can do to improve your sleep: (Sleep Hygiene) Basic rules for a good night's sleep  Create a regular sleep schedule. This will help you form a sleep routine. Keep a record of your sleep patterns, and any sleeping problems you have. Bring the record to follow-up visits with healthcare providers. Avoid prolonged use of light-emitting screens before bedtime or watching TV in bed. Avoid forcing sleep. Do not take naps. Naps could make it hard for you to fall asleep at bedtime. Deal with your worries before bedtime. Keep your bedroom cool, quiet, and dark. Turn on white noise, such as a fan, to help you relax. Do not use your bed for any activity that will keep you awake. Do not read, exercise, eat, or watch TV in your bedroom. Get up if you do not fall asleep within 20 minutes. Move to another room and do something relaxing until you become sleepy. Limit caffeine, alcohol, nicotine and food to earlier in the day. Only drink caffeine in the morning. Do not drink alcohol within 6 hours of bedtime. Do not eat a heavy meal right before you go to bed. Avoid smoking, especially in the evening. Exercise regularly. Daily exercise will help you sleep better. Do not exercise within 4 hours of bedtime. Stimulus control therapy rules  1. Go to bed only when sleepy. 2. Do not watch television, read, eat, or worry while in bed. Use bed only for sleep and sex. 3. Get out of bed if unable to fall asleep within 20 minutes and go to another room. Return to bed only when sleepy. Repeat this step as many times as necessary throughout the night. 4. Set an alarm clock to wake up at a fixed time each morning, including weekends. 5. Do not take a nap during the day. Data from: [address], 1957 [facility] Nonpharmacologic treatments of insomnia. J Clin Psychiatry 4502; 53:37. Go to AASM website for more information: Sleepeducation. org  Recommended Reading: Book by shell Chau   No More sleepless nights    Nursing Support:  When: Monday through Friday 7A-5PM except holidays  Where: Our direct line is [phone]. If you are having a true emergency please call 911. In the event that the line is busy or it is after hours please leave a voice message and we will return your call. Please speak clearly, leaving your full name, birth date, best number to reach you and the reason for your call. Medication refills: We will need the name of the medication, the dosage, the ordering provider, whether you get a 30 or 90 day refill, and the pharmacy name and address. Medications will be ordered by the provider only. Nurses cannot call in prescriptions. Please allow 7 days for medication refills. Physician requested updates: If your provider requested that you call with an update after starting medication, please be ready to provide us the medication and dosage, what time you take your medication, the time you attempt to fall asleep, time you fall asleep, when you wake up, and what time you get out of bed. Sleep Study Results: We will contact you with sleep study results and/or next steps after the physician has reviewed your testing.

## 2023-08-21 NOTE — PROGRESS NOTES
Follow-Up Note - [address] [facility]  66 y.o. male  YAF:[id number]  MKQ:[id number]  DOS:8/21/2023    CC: I saw this patient for follow-up in clinic today for Sleep disordered breathing, Coexisting Sleep and Medical Problems. He was last seen in November 2021. Interval changes: Patient received ot a  Dream [employer] Version 2 machine from [place] several months ago. Results of prior studies in 2016: PSG demonstrated AHI of 76/h. Minimum oxygen saturation was 69% and 71.9% of total sleep time spent with saturations below 88%. During the subsequent therapeutic study, sleep disordered breathing appeared to be adequately remediated with CPAP at 14 cm H2O. PFSH, Problem List, Medications & Allergies were reviewed in EMR. He  has a past medical history of Adrenal adenoma, Arthritis, Carpal tunnel syndrome, Chronic back pain, Chronic pain disorder, COVID, CPAP (continuous positive airway pressure) dependence, Cubital tunnel syndrome, Depression, Fatigue, Fatty liver, Folliculitis, Hemorrhoid, Liver lesion, Lumbago, Morbid obesity (HCC), Myofascial pain syndrome, Neuropathy, Obesity (2015), Scalp lesion, Sleep apnea, Sleep difficulties, Thickening of wall of gallbladder, Tinea capitis, Ulnar 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, Umbilical hernia, and Vitamin D deficiency. He has a current medication list which includes the following prescription(s): gabapentin, venlafaxine, venlafaxine, and zolpidem. PHYSIOLOGICAL DATA REVIEW : Using PAP > 4 hours/night 100%. Estimated JADIEL 3/hour with pressure of 14cm H2O ; patient has not been using non FDA approved devices to sanitize the machine. INTERPRETATION: Compliance is excellent; Pressure setting is:optimal; ;   SUBJECTIVE: With respect to use of PAP, Ottoniel Wright  is experiencing minimal adverse effects:dry mouth/throat. He derives benefit. Is satisfied with sleep and daytime function. Sleep Routine: Ottoniel Wright reports getting 7 hrs sleep.   He continues to use Ambien 5 mg as a sleep aid.; he has no difficulty initiating or maintaining sleep . He arises spontaneously and feels refreshed. Valentine Grover denies excessive daytime sleepiness,. He rated [himself] at Total score: 6 /24 on the [place] Sleepiness Scale. Ever, recently he has stress at home taking care of father who is terminal and having difficulty maintaining  Other issues: On medication for anxiety and depression. He had few episodes of acting out dream content few years ago but there has been no recurrence. .     Habits:[ reports that he has never smoked. He has never used smokeless tobacco.], [ reports that he does not currently use alcohol.], [ reports no history of drug use.], Caffeine use:limited; Exercise routine: regular. ROS: Significant for around 21 pounds weight loss since he had COVID. A 10 point review of systems was otherwise negative. EXAM: /82   Ht 5' 10" (1.778 m)   Wt 136 kg (299 lb)   BMI 42.90 kg/m²     Wt Readings from Last 3 Encounters:   08/21/23 136 kg (299 lb)   06/26/23 (!) 145 kg (319 lb)   04/03/23 (!) 144 kg (318 lb)      Patient is well groomed; well appearing. CNS: Alert, orientated, speech clear & coherent  Psych: cooperative and in no distress. Mental state: Appears normal.  H&N: EOMI; NC/AT: No facial pressure marks, no rashes. Skin/Extrem: col & hydration normal; no edema  Resp: Respiratory effort is normal  Physical findings otherwise essentially unchanged from previous. IMPRESSION: Problem List Items & Comorbidities Addressed this Visit    1. LI (obstructive sleep apnea)  PAP DME Resupply/Reorder      2. Other insomnia        3. Myofascial pain syndrome        4. Tension headache        5. Anxiety and depression        6. Stress at home        7. Morbid obesity ([address])        1-4 improved    PLAN:  1. I reviewed results of prior studies and physiologic data with the patient. 2. I discussed treatment options with risks and benefits.   3. Treatment with  PAP is medically necessary and Londell Lovings is agreable to continue use. 4. Care of equipment, methods to improve comfort using PAP and importance of compliance with therapy were discussed. 5. Pressure setting: continue 14 cmH2O.    6. Rx provided to replace supplies and Care coordinated with DME provider. 7. Multi component Cognitive behavioral therapy for Insomnia undertaken - Sleep Restriction, Stimulus control, Relaxation techniques and Sleep hygiene were discussed. With these strategies, he should be able to wean off Ambien. 8. Discussed strategies for weight and stress reduction. 9. Follow-up is advised in 1 year or sooner if needed to monitor progress, compliance and to adjust therapy. Thank you for allowing me to participate in the care of this patient. Sincerely,     Authenticated electronically on 10/17/10   Board Certified Specialist     Portions of the record may have been created with voice recognition software. Occasional wrong word or "sound a like" substitutions may have occurred due to the inherent limitations of voice recognition software. There may also be notations and random deletions of words or characters from malfunctioning software. Read the chart carefully and recognize, using context, where substitutions/deletions have occurred.

## 2023-08-22 ENCOUNTER — TELEPHONE (OUTPATIENT)
Dept: SLEEP CENTER | Facility: CLINIC | Age: 58
End: 2023-08-22

## 2023-08-23 LAB

## 2023-08-24 PROBLEM — Z00.00 MEDICARE ANNUAL WELLNESS VISIT, SUBSEQUENT: Status: RESOLVED | Noted: 2021-05-13 | Resolved: 2023-08-24

## 2023-10-05 ENCOUNTER — TELEPHONE (OUTPATIENT)
Dept: PSYCHIATRY | Facility: CLINIC | Age: 58
End: 2023-10-05

## 2023-10-05 DIAGNOSIS — F33.1 MAJOR DEPRESSIVE DISORDER, RECURRENT EPISODE, MODERATE (HCC): Primary | Chronic | ICD-10-CM

## 2023-10-05 DIAGNOSIS — G47.09 OTHER INSOMNIA: Chronic | ICD-10-CM

## 2023-10-05 DIAGNOSIS — F41.1 GAD (GENERALIZED ANXIETY DISORDER): Chronic | ICD-10-CM

## 2023-10-05 RX ORDER — VENLAFAXINE HYDROCHLORIDE 75 MG/1
75 CAPSULE, EXTENDED RELEASE ORAL DAILY
Qty: 30 CAPSULE | Refills: 3 | Status: SHIPPED | OUTPATIENT
Start: 2023-10-05 | End: 2024-02-02

## 2023-10-05 RX ORDER — GABAPENTIN 400 MG/1
400 CAPSULE ORAL 3 TIMES DAILY
Qty: 90 CAPSULE | Refills: 3 | Status: SHIPPED | OUTPATIENT
Start: 2023-10-05 | End: 2024-02-02

## 2023-10-05 RX ORDER — ZOLPIDEM TARTRATE 5 MG/1
5 TABLET ORAL
Qty: 30 TABLET | Refills: 3 | Status: SHIPPED | OUTPATIENT
Start: 2023-10-05 | End: 2024-02-02

## 2023-10-05 RX ORDER — VENLAFAXINE HYDROCHLORIDE 150 MG/1
150 CAPSULE, EXTENDED RELEASE ORAL DAILY
Qty: 30 CAPSULE | Refills: 3 | Status: SHIPPED | OUTPATIENT
Start: 2023-10-05 | End: 2024-02-02

## 2023-10-05 NOTE — TELEPHONE ENCOUNTER
Spoke with Emily Magdaleno - he stated that he had been doing well and was asking for decrease in Ambien dose. Plan: decrease Ambien to 5 mg at bedtime as needed, escripted for 30 days with 1 RF. Due now as per PDMP  Effexor  mg, Effexor XR 75 mg and Neurontin were also escripted for 30 days with 3 RF    Diagnoses and all orders for this visit:    Major depressive disorder, recurrent episode, moderate (HCC)  -     venlafaxine (EFFEXOR-XR) 150 mg 24 hr capsule; Take 1 capsule (150 mg total) by mouth daily Take with Effexor XR 75 mg for a total dose of 225 mg daily  -     venlafaxine (EFFEXOR-XR) 75 mg 24 hr capsule; Take 1 capsule (75 mg total) by mouth daily Take with Effexor  mg for a total dose of 225 mg daily    ALYSHA (generalized anxiety disorder)  -     gabapentin (NEURONTIN) 400 mg capsule; Take 1 capsule (400 mg total) by mouth 3 (three) times a day  -     venlafaxine (EFFEXOR-XR) 150 mg 24 hr capsule; Take 1 capsule (150 mg total) by mouth daily Take with Effexor XR 75 mg for a total dose of 225 mg daily    Other insomnia  -     zolpidem (AMBIEN) 5 mg tablet;  Take 1 tablet (5 mg total) by mouth daily at bedtime as needed for sleep

## 2023-10-05 NOTE — TELEPHONE ENCOUNTER
Pt called the office and left a voicemail wanting to reschedule his missed appt. Writer returned the call and was able to schedule the pt for 1/24/2024. Pt is seeking a return call from Dr Lizzeth Mejia in regards to his medication.

## 2023-10-10 ENCOUNTER — TELEPHONE (OUTPATIENT)
Dept: PSYCHIATRY | Facility: CLINIC | Age: 58
End: 2023-10-10

## 2023-10-10 NOTE — TELEPHONE ENCOUNTER
Called and left message for patient to call back if he would like to see provider same day 10/10 330pm. Pls cancel 1/24/24 if patient accepts. IF 10/10 is taken please schedule patient for sooner appt than 1/24 if possible. Thank you.

## 2023-11-27 ENCOUNTER — TELEPHONE (OUTPATIENT)
Dept: PSYCHIATRY | Facility: CLINIC | Age: 58
End: 2023-11-27

## 2023-11-27 DIAGNOSIS — G47.09 OTHER INSOMNIA: Primary | Chronic | ICD-10-CM

## 2023-11-27 NOTE — TELEPHONE ENCOUNTER
Called and spoke with patient to inform 11/28 2pm appt was cancelled due to provider being out of the office. Patient has follow up scheduled for 1/24/24 230pm in office. Writer placed patient on cancellation list for sooner appt. Patient stated he was going to speak to provider regarding his Zolpidem 5mg. He said he recently went from 10mg to 5mg and it is not working. He requested to go back to 10mg. He currently has scripts for refill at the pharmacy however would need new scripts with the change if dosage. Advised patient message would be sent and staff would reach out to inform of response from provider.

## 2023-12-01 NOTE — TELEPHONE ENCOUNTER
LM for Blanca Escobar-  Dr Chitra Lynn has been out all week and will RTO on Monday. She will review this message at that time.        For review on RTO

## 2023-12-01 NOTE — TELEPHONE ENCOUNTER
Alphia Cinnamon called and LM stating he never heard back from the nurse he spoke to when his appointment was scheduled. He is looking to go back to the Zolpidem 10 mg instead of 5 mg.        Blanca Escobar- [phone]

## 2023-12-04 RX ORDER — ZOLPIDEM TARTRATE 10 MG/1
10 TABLET ORAL
Qty: 30 TABLET | Refills: 1 | Status: SHIPPED | OUTPATIENT
Start: 2023-12-04 | End: 2024-02-02

## 2023-12-04 NOTE — TELEPHONE ENCOUNTER
Another message was left on voicemail for Nicki Sandoval to call back regarding discussing medication adjustment

## 2023-12-04 NOTE — TELEPHONE ENCOUNTER
The patient contacted the office, seeking an update on his dosage increase. The writer transferred the call to the clinical staff for assistance.

## 2023-12-04 NOTE — TELEPHONE ENCOUNTER
I called Olivia Porter again and he answered the phone this time. He has not been sleeping well again after he was again diagnosed with COVID last week and would like to go back to taking Ambien 10 mg at bedtime as needed (he requested a lower dose in October 2023 after he no longer had long COVID)    Plan: increase Ambien to 10 mg at bedtime PRN. New Rx escripted for 30 days with 1 RF    Diagnoses and all orders for this visit:    Other insomnia  -     zolpidem (AMBIEN) 10 mg tablet;  Take 1 tablet (10 mg total) by mouth daily at bedtime as needed for sleep

## 2023-12-04 NOTE — TELEPHONE ENCOUNTER
Message left for Kamilah Montoya on his voicemail regarding the need to review his medications before the higher dose is considered.

## 2024-01-14 NOTE — PSYCH
"MEDICATION MANAGEMENT NOTE        [facility]      Name and Date of Birth:  Carlito Palma 58 y.o. 1965 MRN: [id number]    Insurance: Payor: MEDICARE / Plan: MEDICARE A AND B / Product Type: Medicare A & B Fee for Service /     Date of Visit: 2024    Reason for Visit:   Chief Complaint   Patient presents with    Medication Management    Follow-up       SUBJECTIVE:    Carlito is seen today for a follow up for Major Depressive Disorder, Generalized Anxiety Disorder, and insomnia. He has not been seen for follow up since 2022 - states that he had to cancel several appointments due to family issues.  He states that he was doing well last year until he had COVID again in 2023. Since then he has been again experiencing long COVID symptoms and his mood worsened. He feels more depressed, rates mood as 6 on a scale of 1 (best mood) to 10 (worst mood). He reports on and off anxiety symptoms. His father  in August \"he was on home hospice. He had liver cancer\". He has not been able to work since 2023 due to his medical issues. He is glad that he was able to move in with his girlfriend recently to their own home. He also filed for divorce (is still ) and for custody of his stepson who is currently living with him.    He denies any suicidal ideation, intent or plan at present; denies any homicidal ideation, intent or plan at present.    He has no auditory hallucinations, denies any visual hallucinations, has no delusional thoughts.    He denies any side effects from current psychiatric medications.    HPI ROS Appetite Changes and Sleep:     He reports difficulty sleeping, frequent awakenings, decrease in number of sleep hours (4 hours), decreased appetite, recent weight loss (17 lbs), low energy    Current Rating Scores:     Current PHQ-9   PHQ-2/9 Depression Screening    Little interest or pleasure in doing things: 3 - nearly " every day  Feeling down, depressed, or hopeless: 3 - nearly every day  Trouble falling or staying asleep, or sleeping too much: 3 - nearly every day  Feeling tired or having little energy: 3 - nearly every day  Poor appetite or overeating: 3 - nearly every day  Feeling bad about yourself - or that you are a failure or have let yourself or your family down: 3 - nearly every day  Trouble concentrating on things, such as reading the newspaper or watching television: 3 - nearly every day  Moving or speaking so slowly that other people could have noticed. Or the opposite - being so fidgety or restless that you have been moving around a lot more than usual: 3 - nearly every day  Thoughts that you would be better off dead, or of hurting yourself in some way: 0 - not at all  PHQ-9 Score: 24  PHQ-9 Interpretation: Severe depression       Current PHQ-9 score is increased from 19 at the last visit).    Review Of Systems:      Constitutional low energy and recent weight loss (17 lbs)   ENT negative   Cardiovascular negative   Respiratory negative   Gastrointestinal abdominal discomfort   Genitourinary negative   Musculoskeletal muscle aches   Integumentary negative   Neurological negative   Endocrine negative   Other Symptoms none, all other systems are negative       Past Psychiatric History: (unchanged information from previous note copied and updated)    Past Inpatient Psychiatric Treatment:   No history of past inpatient psychiatric admissions.  Past Outpatient Psychiatric Treatment:    In outpatient treatment at [facility] since 2/2017.  Past Suicide Attempts: no  Past Violent Behavior: no  Past Psychiatric Medication Trials: Lexapro, Zoloft, Wellbutrin XL, Trazodone, Abilify, Ambien, Cymbalta, Buspar and Melatonin    Traumatic History: (unchanged information from previous note copied and updated)    Abuse: no history of sexual abuse, no history of physical abuse  Other Traumatic Events: none      Past Medical History:    Past Medical History:   Diagnosis Date    Adrenal adenoma     Arthritis     Carpal tunnel syndrome     Chronic back pain     Chronic pain disorder     COVID     CPAP (continuous positive airway pressure) dependence     Cubital tunnel syndrome     Depression     Fatigue     Fatty liver     Folliculitis     Hemorrhoid     Liver lesion     Lumbago     Morbid obesity (HCC)     Myofascial pain syndrome     Neuropathy     Obesity 2015    Scalp lesion     Sleep apnea     CPAP dependance    Sleep difficulties     Thickening of wall of gallbladder     Tinea capitis     Ulnar 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     Umbilical hernia     Vitamin D deficiency         Past Surgical History:   Procedure Laterality Date    CARPAL TUNNEL RELEASE Bilateral     CHOLECYSTECTOMY      COLONOSCOPY      CYST REMOVAL      Scalp    EGD      EYE MUSCLE SURGERY Bilateral     as a child for amblyopia    HAND SURGERY      HEMORROIDECTOMY      HERNIA REPAIR      KNEE ARTHROSCOPY Right     PATELLA SURGERY Right     scraped out scar tissue per pt     GA COLONOSCOPY FLX DX W/COLLJ SPEC WHEN PFRMD N/A 2/10/2016    Procedure: COLONOSCOPY;  Surgeon: YANIRA Gastelum MD;  Location: [facility];  Service: Colorectal    GA EGD TRANSORAL BIOPSY SINGLE/MULTIPLE N/A 8/23/2017    Procedure: ESOPHAGOGASTRODUODENOSCOPY (EGD) with bx;  Surgeon: Sung Riley MD;  Location: [facility];  Service: Bariatrics    GA EXC B9 LESION MRGN XCP SK TG S/N/H/F/G 0.6-1.0CM N/A 12/8/2016    Procedure: EXCISION OF SCALP LESION;  Surgeon: Will Toledo MD;  [facility];  Service: General    GA HEMORRHOIDECTOMY INT & XTRNL 2/> COLUMN/MICHAELLE N/A 2/12/2016    Procedure: HEMORRHOIDECTOMY EXCISION, EUA, Anal fistulotomy;  Surgeon: YANIRA Gastelum MD;  [facility];  Service: Colorectal    GA LAPAROSCOPY SURG CHOLECYSTECTOMY N/A 3/11/2021    Procedure: CHOLECYSTECTOMY LAPAROSCOPIC;  Surgeon: Charbel Durbin MD;  Location: [facility];  Service: General     AR RPR UMBILICAL HRNA 5 YRS/> REDUCIBLE N/A 3/11/2021    Procedure: REPAIR HERNIA UMBILICAL RECURRENT;  Surgeon: Charbel Durbin MD;  Location: [facility];  Service: General    UMBILICAL HERNIA REPAIR       Allergies   Allergen Reactions    Penicillins Rash       Substance Abuse History:    Social History     Substance and Sexual Activity   Alcohol Use Not Currently    Comment: Social alcohol use     Social History     Substance and Sexual Activity   Drug Use No       Social History:    Social History     Socioeconomic History    Marital status: Legally      Spouse name: Not on file    Number of children: 1    Years of education: some college    Highest education level: Some college, no degree   Occupational History    Occupation: on disability, works part time in food delivery   Tobacco Use    Smoking status: Never    Smokeless tobacco: Never   Vaping Use    Vaping status: Never Used   Substance and Sexual Activity    Alcohol use: Not Currently     Comment: Social alcohol use    Drug use: No    Sexual activity: Yes     Partners: Female     Birth control/protection: Condom Male   Other Topics Concern    Not on file   Social History Narrative    Education: some college    Learning Disabilities: none    Marital History:     Children: 1 adult son    Living Arrangement: lives in home with stepson, girlfriend and girlfriend's son    Occupational History: on disability, employed part time in food delivery, 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    Functioning Relationships: good support system    Legal History: none     History: None        Most recent tobacco use screenin2018     Social Determinants of Health     Financial Resource Strain: Medium Risk (2024)    Overall Financial Resource Strain (CARDIA)     Difficulty of Paying Living Expenses: Somewhat hard   Food Insecurity: No Food Insecurity (2024)    Hunger Vital Sign     Worried  About Running Out of Food in the Last Year: Never true     Ran Out of Food in the Last Year: Never true   Transportation Needs: No Transportation Needs (1/24/2024)    PRAPARE - Transportation     Lack of Transportation (Medical): No     Lack of Transportation (Non-Medical): No   Physical Activity: Inactive (1/24/2024)    Exercise Vital Sign     Days of Exercise per Week: 0 days     Minutes of Exercise per Session: 0 min   Stress: Stress Concern Present (1/24/2024)    [place] [employer] - Occupational Stress Questionnaire     Feeling of Stress : To some extent   Social Connections: Socially Isolated (1/24/2024)    Social Connection and Isolation Panel [NHANES]     Frequency of Communication with Friends and Family: More than three times a week     Frequency of Social Gatherings with Friends and Family: Twice a week     Attends Scientology Services: Never     Active Member of Clubs or Organizations: No     Attends Club or Organization Meetings: Never     Marital Status:    Intimate Partner Violence: Not At Risk (1/24/2024)    Humiliation, Afraid, Rape, and Kick questionnaire     Fear of Current or Ex-Partner: No     Emotionally Abused: No     Physically Abused: No     Sexually Abused: No   Housing Stability: Low Risk  (1/24/2024)    Housing Stability Vital Sign     Unable to Pay for Housing in the Last Year: No     Number of Places Lived in the Last Year: 2     Unstable Housing in the Last Year: No       Family Psychiatric History:     Family History   Problem Relation Age of Onset    Cancer Mother     Diabetes type II Father     Cancer Father     Depression Sister     Suicide Attempts Sister     No Known Problems Sister     Substance Abuse Neg Hx        History Review: The following portions of the patient's history were reviewed and updated as appropriate: allergies, current medications, past family history, past medical history, past social history, past surgical history, and problem  "list.         OBJECTIVE:     Vital signs in last 24 hours:    Vitals:    01/24/24 1428   BP: 146/79   Pulse: 84   Weight: 134 kg (296 lb)   Height: 5' 10\" (1.778 m)       Mental Status Evaluation:    Appearance age appropriate, casually dressed   Behavior cooperative, appears anxious   Speech normal rate, normal volume, normal pitch   Mood depressed, anxious   Affect constricted   Thought Processes organized, goal directed   Associations intact associations   Thought Content no overt delusions   Perceptual Disturbances: no auditory hallucinations, no visual hallucinations   Abnormal Thoughts  Risk Potential Suicidal ideation - None  Homicidal ideation - None  Potential for aggression - No   Orientation oriented to person, place, time/date, and situation   Memory recent and remote memory grossly intact   Consciousness alert and awake   Attention Span Concentration Span decreased attention span  decreased concentration   Intellect appears to be of average intelligence   Insight intact   Judgement intact   Muscle Strength and  Gait normal muscle strength and normal muscle tone, normal gait and normal balance   Motor activity no abnormal movements   Language no difficulty naming common objects, no difficulty repeating a phrase, no difficulty writing a sentence   Fund of Knowledge adequate knowledge of current events  adequate fund of knowledge regarding past history  adequate fund of knowledge regarding vocabulary    Pain moderate   Pain Scale 6       Laboratory Results: I have personally reviewed all pertinent laboratory/tests results    Recent Labs (last 18 months):   Telephone on 08/22/2023   Component Date Value    Supplier Name 08/22/2023 [employer]     Supplier Phone Number 08/22/2023 [phone]     Order Status 08/22/2023 Delivery Successful     Delivery Request Date 08/22/2023 08/22/2023     Date Delivered  08/22/2023 08/23/2023     Item Description 08/22/2023 PAP Accessory     Item " Description 08/22/2023 PAP Mask, Full Face, Fit Upon Setup, N/A, 1 per 3 months     Item Description 08/22/2023 Humidifier Water Chamber, 1 per 6 months     Item Description 08/22/2023 PAP Headgear, 1 per 6 months     Item Description 08/22/2023 PAP Chinstrap, 1 per 6 months     Item Description 08/22/2023 PAP Humidifier, Heated     Item Description 08/22/2023 Heated PAP Tubing, 1 per 3 months     Item Description 08/22/2023 Disposable PAP Filter, 2 per 1 month     Item Description 08/22/2023 Non-Disposable PAP Filter, 1 per 6 months     Item Description 08/22/2023 PAP Mask Interface Cushion, Full Face, 1 per 1 month    Appointment on 04/04/2023   Component Date Value    Hepatitis C Ab 04/04/2023 Non-reactive     WBC 04/04/2023 6.75     RBC 04/04/2023 5.01     Hemoglobin 04/04/2023 14.7     Hematocrit 04/04/2023 43.2     MCV 04/04/2023 86     MCH 04/04/2023 29.3     MCHC 04/04/2023 34.0     RDW 04/04/2023 13.1     MPV 04/04/2023 9.6     Platelets 04/04/2023 263     nRBC 04/04/2023 0     Neutrophils Relative 04/04/2023 58     Immat GRANS % 04/04/2023 1     Lymphocytes Relative 04/04/2023 29     Monocytes Relative 04/04/2023 9     Eosinophils Relative 04/04/2023 2     Basophils Relative 04/04/2023 1     Neutrophils Absolute 04/04/2023 3.94     Immature Grans Absolute 04/04/2023 0.04     Lymphocytes Absolute 04/04/2023 1.95     Monocytes Absolute 04/04/2023 0.62     Eosinophils Absolute 04/04/2023 0.15     Basophils Absolute 04/04/2023 0.05     Sodium 04/04/2023 137     Potassium 04/04/2023 3.9     Chloride 04/04/2023 110 (H)     CO2 04/04/2023 26     ANION GAP 04/04/2023 1 (L)     BUN 04/04/2023 18     Creatinine 04/04/2023 1.11     Glucose, Fasting 04/04/2023 116 (H)     Calcium 04/04/2023 8.5     AST 04/04/2023 15     ALT 04/04/2023 28     Alkaline Phosphatase 04/04/2023 62     Total Protein 04/04/2023 6.7     Albumin 04/04/2023 3.5     Total Bilirubin 04/04/2023 0.87     eGFR 04/04/2023 73     Cholesterol  04/04/2023 159     Triglycerides 04/04/2023 134     HDL, Direct 04/04/2023 32 (L)     LDL Calculated 04/04/2023 100     Non-HDL-Chol (CHOL-HDL) 04/04/2023 127     TSH 3RD GENERATON 04/04/2023 1.770     Prostate Specific Antige* 04/04/2023 2.2     PSA, Free 04/04/2023 0.28     PSA, Free Pct 04/04/2023 12.7     CRP 04/04/2023 3.8 (H)     ANGELINA 04/04/2023 Negative     Hemoglobin A1C 04/04/2023 5.9 (H)     EAG 04/04/2023 123     HIV-1 p24 Antigen 04/04/2023 Non-Reactive     HIV-1 Antibody 04/04/2023 Non-Reactive     HIV-2 Antibody 04/04/2023 Non-Reactive     HIV Ag-Ab 5th Gen 04/04/2023 Non-Reactive        Suicide/Homicide Risk Assessment:    Risk of Harm to Self:  Demographic risk factors include: , , age: over 50 or older  Historical Risk Factors include: chronic depressive symptoms, chronic anxiety symptoms  Recent Specific Risk Factors include: diagnosis of depression, current depressive symptoms, current anxiety symptoms, health problems  Protective Factors: no current suicidal ideation, compliant with medications, compliant with mental health treatment, responsibilities and duties to others, stable living environment, supportive girlfriend  Weapons: gun. The following steps have been taken to ensure weapons are properly secured: locked  Based on today's assessment, Carlito presents the following risk of harm to self: low    Risk of Harm to Others:  The following ratings are based on assessment at the time of the interview  Based on today's assessment, Carlito presents the following risk of harm to others: none    The following interventions are recommended: no intervention changes needed    Assessment/Plan:       Diagnoses and all orders for this visit:    Major depressive disorder, recurrent, severe without psychotic features (HCC)  -     venlafaxine (EFFEXOR-XR) 150 mg 24 hr capsule; Take 1 capsule (150 mg total) by mouth daily Take with Effexor XR 75 mg for a total dose of 225 mg daily  -      venlafaxine (EFFEXOR-XR) 75 mg 24 hr capsule; Take 1 capsule (75 mg total) by mouth daily Take with Effexor  mg for a total dose of 225 mg daily  -     buPROPion (Wellbutrin XL) 150 mg 24 hr tablet; Take 1 tablet (150 mg total) by mouth daily    ALYSHA (generalized anxiety disorder)  -     venlafaxine (EFFEXOR-XR) 150 mg 24 hr capsule; Take 1 capsule (150 mg total) by mouth daily Take with Effexor XR 75 mg for a total dose of 225 mg daily  -     gabapentin (NEURONTIN) 400 mg capsule; Take 1 capsule (400 mg total) by mouth 3 (three) times a day  -     venlafaxine (EFFEXOR-XR) 75 mg 24 hr capsule; Take 1 capsule (75 mg total) by mouth daily Take with Effexor  mg for a total dose of 225 mg daily    Other insomnia  -     zolpidem (AMBIEN) 10 mg tablet; Take 1 tablet (10 mg total) by mouth daily at bedtime as needed for sleep Do not start before February 2, 2024.          Treatment Recommendations/Precautions:    Continue Effexor  mg daily to improve depressive symptoms  Continue Neurontin 400 mg three times a day to improve anxiety symptoms  Add Wellbutrin  mg daily also to help with depressive symptoms  Continue Ambien 10 mg at bedtime as needed to help with insomnia  Medication management every 2 months  No longer sees a therapist and does not want to restart therapy at this time  Follows with family physician for yearly physical exam, arthritis, chronic pain, gastrointestinal issues, sleep apnea, and long COVID  Aware of 24 hour and weekend coverage for urgent situations accessed by calling [facility] main practice number  Crisis Plan was completed during the session and Crisis Plan Note was provided to the patient  I am scheduling this patient out for greater than 3 months: No    Medications Risks/Benefits      Risks, Benefits And Possible Side Effects Of Medications:    Risks, benefits, and possible side effects of medications explained to Carlito including risk of  suicidality and serotonin syndrome related to treatment with antidepressants and risk of impaired next-day mental alertness, complex sleep-related behavior and dependence related to treatment with hypnotic medications. He verbalizes understanding and agreement for treatment.    Controlled Medication Discussion:     Carlito has been filling controlled prescriptions on time as prescribed according to [place] Prescription Drug Monitoring Program    Psychotherapy Provided:     Individual psychotherapy provided: Yes  Counseling was provided during the session today for 16 minutes.  Medications, treatment progress and treatment plan reviewed with Carlito.  Medication changes discussed with Carlito.  Medication education provided to Carlito.  Goals discussed during in session: improve control of anxiety, improve control of depression, and help with sleep.   Discussed with Carlito coping with father's death, health issues, and chronic pain.   Coping mechanisms including listening to music, spending time with family, and taking time for self reviewed with Carlito.   Supportive therapy provided.      Treatment Plan:    Completed and signed during the session: Yes - with Carlito    Note Share:    This note was shared with patient.    Visit Time    Visit Start Time: 2:29 PM  Visit Stop Time: 3:00 PM  Total Visit Duration:  31 minutes    Ivette Bass MD 01/24/24

## 2024-01-24 ENCOUNTER — OFFICE VISIT (OUTPATIENT)
Dept: PSYCHIATRY | Facility: CLINIC | Age: 59
End: 2024-01-24
Payer: MEDICARE

## 2024-01-24 VITALS
HEIGHT: 70 IN | HEART RATE: 84 BPM | SYSTOLIC BLOOD PRESSURE: 146 MMHG | BODY MASS INDEX: 42.37 KG/M2 | WEIGHT: 296 LBS | DIASTOLIC BLOOD PRESSURE: 79 MMHG

## 2024-01-24 DIAGNOSIS — G47.09 OTHER INSOMNIA: Chronic | ICD-10-CM

## 2024-01-24 DIAGNOSIS — F41.1 GAD (GENERALIZED ANXIETY DISORDER): Chronic | ICD-10-CM

## 2024-01-24 DIAGNOSIS — F33.2 MAJOR DEPRESSIVE DISORDER, RECURRENT, SEVERE WITHOUT PSYCHOTIC FEATURES (HCC): Primary | Chronic | ICD-10-CM

## 2024-01-24 PROCEDURE — 90833 PSYTX W PT W E/M 30 MIN: CPT | Performed by: PSYCHIATRY & NEUROLOGY

## 2024-01-24 PROCEDURE — 99214 OFFICE O/P EST MOD 30 MIN: CPT | Performed by: PSYCHIATRY & NEUROLOGY

## 2024-01-24 RX ORDER — VENLAFAXINE HYDROCHLORIDE 75 MG/1
75 CAPSULE, EXTENDED RELEASE ORAL DAILY
Qty: 30 CAPSULE | Refills: 4 | Status: SHIPPED | OUTPATIENT
Start: 2024-01-24 | End: 2024-06-22

## 2024-01-24 RX ORDER — BUPROPION HYDROCHLORIDE 150 MG/1
150 TABLET ORAL DAILY
Qty: 30 TABLET | Refills: 4 | Status: SHIPPED | OUTPATIENT
Start: 2024-01-24 | End: 2024-06-22

## 2024-01-24 RX ORDER — ZOLPIDEM TARTRATE 10 MG/1
10 TABLET ORAL
Qty: 30 TABLET | Refills: 4 | Status: SHIPPED | OUTPATIENT
Start: 2024-02-02 | End: 2024-07-01

## 2024-01-24 RX ORDER — GABAPENTIN 400 MG/1
400 CAPSULE ORAL 3 TIMES DAILY
Qty: 90 CAPSULE | Refills: 4 | Status: SHIPPED | OUTPATIENT
Start: 2024-01-24 | End: 2024-06-22

## 2024-01-24 RX ORDER — VENLAFAXINE HYDROCHLORIDE 150 MG/1
150 CAPSULE, EXTENDED RELEASE ORAL DAILY
Qty: 30 CAPSULE | Refills: 4 | Status: SHIPPED | OUTPATIENT
Start: 2024-01-24 | End: 2024-06-22

## 2024-01-24 NOTE — BH TREATMENT PLAN
"TREATMENT PLAN (Medication Management Only)        [facility]    Name/Date of Birth/MRN:  Carlito Palma 58 y.o. 1965 MRN: [id number]  Date of Treatment Plan: January 24, 2024  Diagnosis/Diagnoses:   1. Major depressive disorder, recurrent, severe without psychotic features (HCC)    2. ALYSHA (generalized anxiety disorder)    3. Other insomnia      Strengths/Personal Resources for Self-Care: \"not giving up\".  Area/Areas of need (in own words): \"anxiety, depression, I want to get back to exercising\".  1. Long Term Goal:   improve control of anxiety, improve control of depression, help with sleep  Target Date: 2 months - 3/24/2024  Person/Persons responsible for completion of goal: Carlito  2.  Short Term Objective (s) - How will we reach this goal?:   A.  Provider new recommended medication/dosage changes and/or continue medication(s): start Wellbutrin XL, continue all other medications (Effexor XR, Neurontin, and Ambien).  B.  N/A.  C.  N/A.  Target Date: 2 months - 3/24/2024  Person/Persons Responsible for Completion of Goal: Carlito   Progress Towards Goals: regressed  Treatment Modality: medication management every 2 months  Review due 180 days from date of this plan: 6 months - 7/24/2024  Expected length of service: ongoing treatment unless revised  My Physician/PA/NP and I have developed this plan together and I agree to work on the goals and objectives. I understand the treatment goals that were developed for my treatment.  Electronic Signatures: on file (unless signed below)    Ivette Bass MD 01/24/24  "

## 2024-01-24 NOTE — BH CRISIS PLAN
Client Name: Carlito Palma       Client YOB: 1965    [facility] Safety Plan      Creation Date: 1/24/24 Update Date: 1/24/24   Created By: Ivette Bass MD       Step 1: Warning Signs:   Warning Signs   stomach pain            Step 2: Internal Coping Strategies:   Internal Coping Strategies   listen to music            Step 3: People and social settings that provide distraction:   Name Contact Information   girlfriend Ashlee in cell phone    in cell phone            Step 4: People whom I can ask for help during a crisis:      Name Contact Information    As above people       Step 5: Professionals or agencies I can contact during a crisis:      Clinican/Agency Name Phone Emergency Contact    [facility] [phone]       [facility] Emergency Department Emergency Department Phone Emergency Department Address    [facility] [phone]         Crisis Phone Numbers:   Suicide Prevention Lifeline: Call or Text  985 Crisis Text Line: Text HOME to 332-612   Please note: Some [facility] do not have a separate number for Child/Adolescent specific crisis. If your county is not listed under Child/Adolescent, please call the adult number for your county      Adult Crisis Numbers: Child/Adolescent Crisis Numbers   [facility]: [phone] [facility]: [phone]   [facility]: [phone] [facility]: [phone]   [facility]: [phone] [place]: [phone]   [facility]: [phone] [place]: [phone]   [facility]: [phone]   [facility]: [phone]   [facility]: [phone]   [facility]: [phone] (daytime) [phone] (after hours, weekends, holidays)      Step 6: Making the environment safer (plan for lethal means safety):   Plan: Gun - locked     Optional: What is most important to me and worth living for?   Helping family     [employer] Safety Plan.  Mamta Rosado and Gary Fritz. Used with permission of the authors.

## 2024-04-09 ENCOUNTER — TELEPHONE (OUTPATIENT)
Dept: PSYCHIATRY | Facility: CLINIC | Age: 59
End: 2024-04-09

## 2024-04-15 NOTE — PROGRESS NOTES
Assessment/Plan:   1  ALYSHA (generalized anxiety disorder)      2  Major depressive disorder, recurrent episode, in partial remission ([facility])           Subjective:     Patient ID: Guera Hernandez is a 56 y o  male  Outpatient Discharge Summary:   Admission Date: 2021   Alesha Miller was referred by self  Discharge Date: 2022    Discharge Diagnosis:    1  ALYSHA (generalized anxiety disorder)      2  Major depressive disorder, recurrent episode, in partial remission [facility])         Treating Physician: Tiffany Gottron  Treatment Complications: Frequent no shows, lost contact  Presenting Problem: Reported trauma from legal stressors related to being let go from a long time job  Resulted in symptoms of depression, anxiety  Grief for brother who  suddenly  Course of treatment includes:    medication management, psychiatric evaluation and individual therapy   Treatment Progress: fair  Criteria for Discharge: No communication for reschedule     Aftercare recommendations include Continue with medication management and counseling  Discharge Medications include:  Current Outpatient Medications:   •  gabapentin (NEURONTIN) 400 mg capsule, Take 1 capsule (400 mg total) by mouth 3 (three) times a day, Disp: 90 capsule, Rfl: 4  •  venlafaxine (EFFEXOR-XR) 150 mg 24 hr capsule, Take 1 capsule (150 mg total) by mouth daily Take with Effexor XR 75 mg for a total dose of 225 mg daily, Disp: 30 capsule, Rfl: 4  •  venlafaxine (EFFEXOR-XR) 75 mg 24 hr capsule, Take 1 capsule (75 mg total) by mouth daily Take with Effexor  mg for a total dose of 225 mg daily, Disp: 30 capsule, Rfl: 4  •  zolpidem (AMBIEN) 10 mg tablet, Take 1 tablet (10 mg total) by mouth daily at bedtime as needed for sleep Do not start before 2022 , Disp: 30 tablet, Rfl: 4    Prognosis: fair Last Appt. 11/7/2023   Next Appt. 5/7/2024   RX Pended

## 2024-04-23 ENCOUNTER — TELEPHONE (OUTPATIENT)
Dept: PSYCHIATRY | Facility: CLINIC | Age: 59
End: 2024-04-23

## 2024-04-23 NOTE — TELEPHONE ENCOUNTER
Patient called office wanting to r/s appointment. Writer informed patient that due to no show and multiple cancellations the message would be sent to provider to see if were able to continue care.

## 2024-06-25 DIAGNOSIS — F41.1 GAD (GENERALIZED ANXIETY DISORDER): Chronic | ICD-10-CM

## 2024-06-25 DIAGNOSIS — F33.2 MAJOR DEPRESSIVE DISORDER, RECURRENT, SEVERE WITHOUT PSYCHOTIC FEATURES (HCC): Primary | Chronic | ICD-10-CM

## 2024-06-25 RX ORDER — VENLAFAXINE HYDROCHLORIDE 150 MG/1
150 CAPSULE, EXTENDED RELEASE ORAL DAILY
Qty: 30 CAPSULE | Refills: 2 | Status: SHIPPED | OUTPATIENT
Start: 2024-06-25 | End: 2024-09-23

## 2024-06-25 RX ORDER — VENLAFAXINE HYDROCHLORIDE 75 MG/1
75 CAPSULE, EXTENDED RELEASE ORAL DAILY
Qty: 30 CAPSULE | Refills: 2 | Status: SHIPPED | OUTPATIENT
Start: 2024-06-25 | End: 2024-09-23

## 2024-06-26 DIAGNOSIS — G47.09 OTHER INSOMNIA: Primary | Chronic | ICD-10-CM

## 2024-06-26 RX ORDER — ZOLPIDEM TARTRATE 10 MG/1
10 TABLET ORAL
Qty: 30 TABLET | Refills: 2 | Status: SHIPPED | OUTPATIENT
Start: 2024-07-01 | End: 2024-09-29

## 2024-06-26 NOTE — TELEPHONE ENCOUNTER
Medication Refill Request     Name of Medication Zolpidem  Dose/Frequency 10 mg/ Take 1 tablet by mouth daily at bedtime as needed for sleep.  Quantity 30  Verified pharmacy   [x]  Verified ordering Provider   [x]  Does patient have enough for the next 3 days? Yes [] No [x]  Does patient have a follow-up appointment scheduled? Yes [x] No []   If so when is appointment: 9/19/2024

## 2024-09-09 NOTE — PSYCH
MEDICATION MANAGEMENT NOTE        [facility]      Name and Date of Birth:  Carlito Palma 59 y.o. 1965 MRN: [id number]    Insurance: Payor: MEDICARE / Plan: MEDICARE A AND B / Product Type: Medicare A & B Fee for Service /     Date of Visit: September 19, 2024    Reason for Visit:   Chief Complaint   Patient presents with    Medication Management    Follow-up    Virtual Regular Visit     Assessment & Plan  Major depressive disorder, recurrent, severe without psychotic features (HCC)  Ongoing symptoms  Continue Effexor  mg daily to control depressive symptoms  Increase Wellbutrin XL to 300 mg daily also to control depressive symptoms  Orders:    venlafaxine (EFFEXOR-XR) 75 mg 24 hr capsule; Take 1 capsule (75 mg total) by mouth daily Take with Effexor  mg for a total dose of 225 mg daily    venlafaxine (EFFEXOR-XR) 150 mg 24 hr capsule; Take 1 capsule (150 mg total) by mouth daily Take with Effexor XR 75 mg for a total dose of 225 mg daily    buPROPion (Wellbutrin XL) 300 mg 24 hr tablet; Take 1 tablet (300 mg total) by mouth daily    ALYSHA (generalized anxiety disorder)  Continues to have anxiety symptoms  Continue Neurontin 400 mg three times a day to control anxiety symptoms  Orders:    gabapentin (NEURONTIN) 400 mg capsule; Take 1 capsule (400 mg total) by mouth 3 (three) times a day    venlafaxine (EFFEXOR-XR) 75 mg 24 hr capsule; Take 1 capsule (75 mg total) by mouth daily Take with Effexor  mg for a total dose of 225 mg daily    venlafaxine (EFFEXOR-XR) 150 mg 24 hr capsule; Take 1 capsule (150 mg total) by mouth daily Take with Effexor XR 75 mg for a total dose of 225 mg daily    Other insomnia  Continue Ambien 10 mg at bedtime as needed to help with insomnia  Orders:    zolpidem (AMBIEN) 10 mg tablet; Take 1 tablet (10 mg total) by mouth daily at bedtime as needed for sleep Do not start before September 29, 2024.         Treatment  "Recommendations/Precautions:    Follows with family physician for yearly physical exam, arthritis, chronic pain, gastrointestinal issues, sleep apnea, and COVID symptoms  Aware of 24 hour and weekend coverage for urgent situations accessed by calling [facility] main practice number  Medication management every 2 months  I am scheduling this patient out for greater than 3 months: No    Medications Risks/Benefits:      Risks, Benefits And Possible Side Effects Of Medications:    Risks, benefits, and possible side effects of medications explained to Carlito including risk of suicidality and serotonin syndrome related to treatment with antidepressants and risk of impaired next-day mental alertness, complex sleep-related behavior and dependence related to treatment with hypnotic medications. He verbalizes understanding and agreement for treatment.    Controlled Medication Discussion:     Carlito has been filling controlled prescriptions on time as prescribed according to [place] Prescription Drug Monitoring Program    SUBJECTIVE:    Carlito is seen today for a follow up for Major Depressive Disorder, Generalized Anxiety Disorder, and insomnia. He continues to experience on and off symptoms since the last visit. He still feels depressed \"I struggle with everything\". He continues to experience significant anxiety symptoms. He is still frustrated with long COVID symptoms and now also has been concerned with potential new COVID infection. He reports some difficulty in relationship with his girlfriend who has autistic son \"I have to take care of him when she goes to [place]\".    He denies any suicidal ideation, intent or plan at present; denies any homicidal ideation, intent or plan at present.    He has no auditory hallucinations, denies any visual hallucinations, has no delusional thinking.    He denies any side effects from current psychiatric medications.    HPI ROS Appetite Changes and Sleep: "     He reports difficulty sleeping, decrease in number of sleep hours (4 hours), normal appetite, recent weight loss (6 lbs), low energy    Current Rating Scores:     Current PHQ-9   PHQ-2/9 Depression Screening    Little interest or pleasure in doing things: 3 - nearly every day  Feeling down, depressed, or hopeless: 3 - nearly every day  Trouble falling or staying asleep, or sleeping too much: 3 - nearly every day  Feeling tired or having little energy: 3 - nearly every day  Poor appetite or overeatin - more than half the days  Feeling bad about yourself - or that you are a failure or have let yourself or your family down: 3 - nearly every day  Trouble concentrating on things, such as reading the newspaper or watching television: 3 - nearly every day  Moving or speaking so slowly that other people could have noticed. Or the opposite - being so fidgety or restless that you have been moving around a lot more than usual: 0 - not at all  Thoughts that you would be better off dead, or of hurting yourself in some way: 0 - not at all  PHQ-9 Score: 20  PHQ-9 Interpretation: Severe depression       Current PHQ-9 score is decreased from 24 at the last visit).    Review Of Systems:      Constitutional low energy and recent weight loss (6 lbs)   ENT nasal congestion   Cardiovascular negative   Respiratory cough   Gastrointestinal negative   Genitourinary negative   Musculoskeletal leg pain   Integumentary negative   Neurological negative   Endocrine negative   Other Symptoms none, all other systems are negative       Past Psychiatric History: (unchanged information from previous note copied and updated)    Past Inpatient Psychiatric Treatment:   No history of past inpatient psychiatric admissions.  Past Outpatient Psychiatric Treatment:    In outpatient treatment at [facility] since 2017.  Past Suicide Attempts: no  Past Violent Behavior: no  Past Psychiatric Medication Trials: Lexapro, Zoloft,  Wellbutrin XL, Trazodone, Abilify, Ambien, Cymbalta, Buspar and Melatonin    Traumatic History: (unchanged information from previous note copied and updated)    Abuse: no history of sexual abuse, no history of physical abuse  Other Traumatic Events: none     Past Medical History:    Past Medical History:   Diagnosis Date    Adrenal adenoma     Arthritis     Carpal tunnel syndrome     Chronic back pain     Chronic pain disorder     COVID     CPAP (continuous positive airway pressure) dependence     Cubital tunnel syndrome     Depression     Fatigue     Fatty liver     Folliculitis     Hemorrhoid     Liver lesion     Lumbago     Morbid obesity (HCC)     Myofascial pain syndrome     Neuropathy     Obesity 2015    Scalp lesion     Sleep apnea     CPAP dependance    Sleep difficulties     Thickening of wall of gallbladder     Tinea capitis     Ulnar 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     Umbilical hernia     Vitamin D deficiency         Past Surgical History:   Procedure Laterality Date    CARPAL TUNNEL RELEASE Bilateral     CHOLECYSTECTOMY      COLONOSCOPY      CYST REMOVAL      Scalp    EGD      EYE MUSCLE SURGERY Bilateral     as a child for amblyopia    HAND SURGERY      HEMORROIDECTOMY      HERNIA REPAIR      KNEE ARTHROSCOPY Right     PATELLA SURGERY Right     scraped out scar tissue per pt     OR COLONOSCOPY FLX DX W/COLLJ SPEC WHEN PFRMD N/A 2/10/2016    Procedure: COLONOSCOPY;  Surgeon: YANIRA Gastelum MD;  Location: [facility];  Service: Colorectal    OR EGD TRANSORAL BIOPSY SINGLE/MULTIPLE N/A 8/23/2017    Procedure: ESOPHAGOGASTRODUODENOSCOPY (EGD) with bx;  Surgeon: Sung Riley MD;  Location: [facility];  Service: Bariatrics    OR EXC B9 LESION MRGN XCP SK TG S/N/H/F/G 0.6-1.0CM N/A 12/8/2016    Procedure: EXCISION OF SCALP LESION;  Surgeon: Will Toledo MD;  Location: [facility];  Service: General    OR HEMORRHOIDECTOMY INT & XTRNL 2/> COLUMN/MICHAELLE N/A 2/12/2016    Procedure: HEMORRHOIDECTOMY EXCISION, EUA, Anal  fistulotomy;  Surgeon: YANIRA Gastelum MD;  [facility];  Service: Colorectal    HI LAPAROSCOPY SURG CHOLECYSTECTOMY N/A 3/11/2021    Procedure: CHOLECYSTECTOMY LAPAROSCOPIC;  Surgeon: Charbel Durbin MD;  Location: [facility];  Service: General    HI RPR UMBILICAL HRNA 5 YRS/> REDUCIBLE N/A 3/11/2021    Procedure: REPAIR HERNIA UMBILICAL RECURRENT;  Surgeon: Charbel Durbin MD;  Location: [facility];  Service: General    UMBILICAL HERNIA REPAIR       Allergies   Allergen Reactions    Penicillins Rash       Current Outpatient Medications:    Current Outpatient Medications   Medication Sig Dispense Refill    buPROPion (Wellbutrin XL) 300 mg 24 hr tablet Take 1 tablet (300 mg total) by mouth daily 30 tablet 4    gabapentin (NEURONTIN) 400 mg capsule Take 1 capsule (400 mg total) by mouth 3 (three) times a day 90 capsule 4    venlafaxine (EFFEXOR-XR) 150 mg 24 hr capsule Take 1 capsule (150 mg total) by mouth daily Take with Effexor XR 75 mg for a total dose of 225 mg daily 30 capsule 4    venlafaxine (EFFEXOR-XR) 75 mg 24 hr capsule Take 1 capsule (75 mg total) by mouth daily Take with Effexor  mg for a total dose of 225 mg daily 30 capsule 4    [START ON 9/29/2024] zolpidem (AMBIEN) 10 mg tablet Take 1 tablet (10 mg total) by mouth daily at bedtime as needed for sleep Do not start before September 29, 2024. 30 tablet 4     No current facility-administered medications for this visit.       Substance Abuse History:    Social History     Substance and Sexual Activity   Alcohol Use Not Currently    Comment: Social alcohol use     Social History     Substance and Sexual Activity   Drug Use No       Social History:    Social History     Socioeconomic History    Marital status: Legally      Spouse name: Not on file    Number of children: 1    Years of education: some college    Highest education level: Some college, no degree   Occupational History    Occupation: on disability, works part time in  food delivery   Tobacco Use    Smoking status: Never    Smokeless tobacco: Never   Vaping Use    Vaping status: Never Used   Substance and Sexual Activity    Alcohol use: Not Currently     Comment: Social alcohol use    Drug use: No    Sexual activity: Yes     Partners: Female     Birth control/protection: Condom Male   Other Topics Concern    Not on file   Social History Narrative    Education: some college    Learning Disabilities: none    Marital History:     Children: 1 adult son    Living Arrangement: lives in home with stepson, girlfriend and girlfriend's son    Occupational History: on disability, employed part time in food delivery, 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    Functioning Relationships: good support system    Legal History: none     History: None        Most recent tobacco use screenin2018     Social Determinants of Health     Financial Resource Strain: Medium Risk (2024)    Overall Financial Resource Strain (CARDIA)     Difficulty of Paying Living Expenses: Somewhat hard   Food Insecurity: No Food Insecurity (2024)    Hunger Vital Sign     Worried About Running Out of Food in the Last Year: Never true     Ran Out of Food in the Last Year: Never true   Transportation Needs: No Transportation Needs (2024)    PRAPARE - Transportation     Lack of Transportation (Medical): No     Lack of Transportation (Non-Medical): No   Physical Activity: Inactive (2024)    Exercise Vital Sign     Days of Exercise per Week: 0 days     Minutes of Exercise per Session: 0 min   Stress: Stress Concern Present (2024)    [employer] [employer] - Occupational Stress Questionnaire     Feeling of Stress : To some extent   Social Connections: Socially Isolated (2024)    Social Connection and Isolation Panel [NHANES]     Frequency of Communication with Friends and Family: More than three times a week      "Frequency of Social Gatherings with Friends and Family: Twice a week     Attends Confucianist Services: Never     Active Member of Clubs or Organizations: No     Attends Club or Organization Meetings: Never     Marital Status:    Intimate Partner Violence: Not At Risk (9/19/2024)    Humiliation, Afraid, Rape, and Kick questionnaire     Fear of Current or Ex-Partner: No     Emotionally Abused: No     Physically Abused: No     Sexually Abused: No   Housing Stability: Low Risk  (9/19/2024)    Housing Stability Vital Sign     Unable to Pay for Housing in the Last Year: No     Number of Times Moved in the Last Year: 1     Homeless in the Last Year: No       Family Psychiatric History:     Family History   Problem Relation Age of Onset    Cancer Mother     Diabetes type II Father     Cancer Father     Depression Sister     Suicide Attempts Sister     No Known Problems Sister     Substance Abuse Neg Hx        History Review: The following portions of the patient's history were reviewed and updated as appropriate: allergies, current medications, past family history, past medical history, past social history, past surgical history, and problem list.         OBJECTIVE:     Vital signs in last 24 hours:    Vitals:    09/19/24 1438   Weight: 132 kg (290 lb)   Height: 5' 10\" (1.778 m)       Mental Status Evaluation:    Appearance age appropriate, casually dressed   Behavior cooperative, appears anxious   Speech normal rate, normal volume, normal pitch   Mood depressed, anxious   Affect blunted   Thought Processes organized, goal directed   Associations intact associations   Thought Content no overt delusions   Perceptual Disturbances: no auditory hallucinations, no visual hallucinations   Abnormal Thoughts  Risk Potential Suicidal ideation - None  Homicidal ideation - None  Potential for aggression - No   Orientation oriented to person, place, time/date, and situation   Memory recent and remote memory grossly intact "   Consciousness alert and awake   Attention Span Concentration Span attention span and concentration appear shorter than expected for age   Intellect appears to be of average intelligence   Insight intact   Judgement intact   Muscle Strength and  Gait normal muscle strength and normal muscle tone, normal gait and normal balance   Motor activity no abnormal movements   Language no difficulty naming common objects, no difficulty repeating a phrase, no difficulty writing a sentence   Fund of Knowledge adequate knowledge of current events  adequate fund of knowledge regarding past history  adequate fund of knowledge regarding vocabulary    Pain moderate   Pain Scale 5       Laboratory Results: I have personally reviewed all pertinent laboratory/tests results    Recent Labs (last 24 months):   Telephone on 08/22/2023   Component Date Value    Supplier Name 08/22/2023 [employer]     Supplier Phone Number 08/22/2023 [phone]     Order Status 08/22/2023 Delivery Successful     Delivery Request Date 08/22/2023 08/22/2023     Date Delivered  08/22/2023 08/23/2023     Item Description 08/22/2023 PAP Accessory     Item Description 08/22/2023 PAP Mask, Full Face, Fit Upon Setup, N/A, 1 per 3 months     Item Description 08/22/2023 Humidifier Water Chamber, 1 per 6 months     Item Description 08/22/2023 PAP Headgear, 1 per 6 months     Item Description 08/22/2023 PAP Chinstrap, 1 per 6 months     Item Description 08/22/2023 PAP Humidifier, Heated     Item Description 08/22/2023 Heated PAP Tubing, 1 per 3 months     Item Description 08/22/2023 Disposable PAP Filter, 2 per 1 month     Item Description 08/22/2023 Non-Disposable PAP Filter, 1 per 6 months     Item Description 08/22/2023 PAP Mask Interface Cushion, Full Face, 1 per 1 month    Appointment on 04/04/2023   Component Date Value    Hepatitis C Ab 04/04/2023 Non-reactive     WBC 04/04/2023 6.75     RBC 04/04/2023 5.01     Hemoglobin 04/04/2023  14.7     Hematocrit 04/04/2023 43.2     MCV 04/04/2023 86     MCH 04/04/2023 29.3     MCHC 04/04/2023 34.0     RDW 04/04/2023 13.1     MPV 04/04/2023 9.6     Platelets 04/04/2023 263     nRBC 04/04/2023 0     Segmented % 04/04/2023 58     Immature Grans % 04/04/2023 1     Lymphocytes % 04/04/2023 29     Monocytes % 04/04/2023 9     Eosinophils Relative 04/04/2023 2     Basophils Relative 04/04/2023 1     Absolute Neutrophils 04/04/2023 3.94     Absolute Immature Grans 04/04/2023 0.04     Absolute Lymphocytes 04/04/2023 1.95     Absolute Monocytes 04/04/2023 0.62     Eosinophils Absolute 04/04/2023 0.15     Basophils Absolute 04/04/2023 0.05     Sodium 04/04/2023 137     Potassium 04/04/2023 3.9     Chloride 04/04/2023 110 (H)     CO2 04/04/2023 26     ANION GAP 04/04/2023 1 (L)     BUN 04/04/2023 18     Creatinine 04/04/2023 1.11     Glucose, Fasting 04/04/2023 116 (H)     Calcium 04/04/2023 8.5     AST 04/04/2023 15     ALT 04/04/2023 28     Alkaline Phosphatase 04/04/2023 62     Total Protein 04/04/2023 6.7     Albumin 04/04/2023 3.5     Total Bilirubin 04/04/2023 0.87     eGFR 04/04/2023 73     Cholesterol 04/04/2023 159     Triglycerides 04/04/2023 134     HDL, Direct 04/04/2023 32 (L)     LDL Calculated 04/04/2023 100     Non-HDL-Chol (CHOL-HDL) 04/04/2023 127     TSH 3RD GENERATON 04/04/2023 1.770     Prostate Specific Antige* 04/04/2023 2.2     PSA, Free 04/04/2023 0.28     PSA, Free Pct 04/04/2023 12.7     CRP 04/04/2023 3.8 (H)     ANGELINA 04/04/2023 Negative     Hemoglobin A1C 04/04/2023 5.9 (H)     EAG 04/04/2023 123     HIV-1 p24 Antigen 04/04/2023 Non-Reactive     HIV-1 Antibody 04/04/2023 Non-Reactive     HIV-2 Antibody 04/04/2023 Non-Reactive     HIV Ag-Ab 5th Gen 04/04/2023 Non-Reactive        Suicide/Homicide Risk Assessment:    Risk of Harm to Self:  Demographic risk factors include: , , male, age: over 50 or older  Historical Risk Factors include: chronic depressive symptoms, chronic  anxiety symptoms  Recent Specific Risk Factors include: diagnosis of depression, current depressive symptoms, current anxiety symptoms, health problems  Protective Factors: no current suicidal ideation, compliant with medications, compliant with mental health treatment, responsibilities and duties to others, stable living environment, supportive girlfriend  Weapons: gun. The following steps have been taken to ensure weapons are properly secured: locked  Based on today's assessment, Carlito presents the following risk of harm to self: low    Risk of Harm to Others:  The following ratings are based on assessment at the time of the interview  Based on today's assessment, Carlito presents the following risk of harm to others: none    The following interventions are recommended: no intervention changes needed    Psychotherapy Provided:     Individual psychotherapy provided: Yes  Counseling was provided during the session today for 17 minutes.  Medications, treatment progress and treatment plan reviewed with Carlito.  Medication changes discussed with Carliot.  Medication education provided to Carlito.  Goals discussed during in session: improve control of anxiety, improve control of depression, and improve sleep.   Discussed with Carlito coping with relationship problems, medical problems, chronic anxiety, and chronic mental illness.   Coping techniques including reducing time spent worrying, relaxation, and spending time with girlfriend reviewed with Carlito.   Supportive therapy provided.      Treatment Plan:    Completed and signed during the session: Yes - Treatment Plan done but not signed at time of office visit due to:  Plan reviewed by video and verbal consent given due to virtual visit. Treatment Plan sent to patient via [employer] for signature.    Note Share:    This note was shared with patient.    Visit Time    Visit Start Time: 2:38 PM  Visit Stop Time: 3:10 PM  Total Visit Duration:  32 minutes    Ivette Bass MD  09/19/24    Virtual Regular Visit    Verification of patient location:    Patient is located at Other (car - not driving) in the following state in which I hold an active license [place]    Assessment/Plan:    Problem List Items Addressed This Visit          Behavioral Health    Major depressive disorder, recurrent, severe without psychotic features (HCC) - Primary (Chronic)       Orders:    venlafaxine (EFFEXOR-XR) 75 mg 24 hr capsule; Take 1 capsule (75 mg total) by mouth daily Take with Effexor  mg for a total dose of 225 mg daily    venlafaxine (EFFEXOR-XR) 150 mg 24 hr capsule; Take 1 capsule (150 mg total) by mouth daily Take with Effexor XR 75 mg for a total dose of 225 mg daily    buPROPion (Wellbutrin XL) 300 mg 24 hr tablet; Take 1 tablet (300 mg total) by mouth daily           Relevant Medications    venlafaxine (EFFEXOR-XR) 75 mg 24 hr capsule    venlafaxine (EFFEXOR-XR) 150 mg 24 hr capsule    buPROPion (Wellbutrin XL) 300 mg 24 hr tablet    zolpidem (AMBIEN) 10 mg tablet (Start on 9/29/2024)    ALYSHA (generalized anxiety disorder) (Chronic)       Orders:    gabapentin (NEURONTIN) 400 mg capsule; Take 1 capsule (400 mg total) by mouth 3 (three) times a day    venlafaxine (EFFEXOR-XR) 75 mg 24 hr capsule; Take 1 capsule (75 mg total) by mouth daily Take with Effexor  mg for a total dose of 225 mg daily    venlafaxine (EFFEXOR-XR) 150 mg 24 hr capsule; Take 1 capsule (150 mg total) by mouth daily Take with Effexor XR 75 mg for a total dose of 225 mg daily           Relevant Medications    gabapentin (NEURONTIN) 400 mg capsule    venlafaxine (EFFEXOR-XR) 75 mg 24 hr capsule    venlafaxine (EFFEXOR-XR) 150 mg 24 hr capsule    buPROPion (Wellbutrin XL) 300 mg 24 hr tablet    zolpidem (AMBIEN) 10 mg tablet (Start on 9/29/2024)       Neurology/Sleep    Other insomnia (Chronic)       Orders:    zolpidem (AMBIEN) 10 mg tablet; Take 1 tablet (10 mg total) by mouth daily at bedtime as needed for sleep Do not  start before September 29, 2024.           Relevant Medications    zolpidem (AMBIEN) 10 mg tablet (Start on 9/29/2024)     Reason for visit is   Chief Complaint   Patient presents with    Medication Management    Follow-up    Virtual Regular Visit     Encounter provider Ivette Bass MD    Recent Visits  No visits were found meeting these conditions.  Showing recent visits within past 7 days and meeting all other requirements  Today's Visits  Date Type Provider Dept   09/19/24 Telephone Ivette Bass MD [facility]   09/19/24 [facility]   Showing today's visits and meeting all other requirements  Future Appointments  No visits were found meeting these conditions.  Showing future appointments within next 150 days and meeting all other requirements     The patient was identified by name and date of birth. Carlito FINN Minerva was informed that this is a telemedicine visit and that the visit is being conducted throughthe Epic Embedded platform. He agrees to proceed..  My office door was closed. No one else was in the room.  He acknowledged consent and understanding of privacy and security of the video platform. The patient has agreed to participate and understands they can discontinue the visit at any time.    Patient is aware this is a billable service.

## 2024-09-19 ENCOUNTER — TELEPHONE (OUTPATIENT)
Dept: PSYCHIATRY | Facility: CLINIC | Age: 59
End: 2024-09-19

## 2024-09-19 ENCOUNTER — TELEPHONE (OUTPATIENT)
Age: 59
End: 2024-09-19

## 2024-09-19 ENCOUNTER — TELEMEDICINE (OUTPATIENT)
Dept: PSYCHIATRY | Facility: CLINIC | Age: 59
End: 2024-09-19
Payer: MEDICARE

## 2024-09-19 VITALS — WEIGHT: 290 LBS | HEIGHT: 70 IN | BODY MASS INDEX: 41.52 KG/M2

## 2024-09-19 DIAGNOSIS — F41.1 GAD (GENERALIZED ANXIETY DISORDER): Chronic | ICD-10-CM

## 2024-09-19 DIAGNOSIS — F33.2 MAJOR DEPRESSIVE DISORDER, RECURRENT, SEVERE WITHOUT PSYCHOTIC FEATURES (HCC): Primary | Chronic | ICD-10-CM

## 2024-09-19 DIAGNOSIS — G47.09 OTHER INSOMNIA: Chronic | ICD-10-CM

## 2024-09-19 PROCEDURE — 90833 PSYTX W PT W E/M 30 MIN: CPT | Performed by: PSYCHIATRY & NEUROLOGY

## 2024-09-19 PROCEDURE — G2211 COMPLEX E/M VISIT ADD ON: HCPCS | Performed by: PSYCHIATRY & NEUROLOGY

## 2024-09-19 PROCEDURE — 99214 OFFICE O/P EST MOD 30 MIN: CPT | Performed by: PSYCHIATRY & NEUROLOGY

## 2024-09-19 RX ORDER — VENLAFAXINE HYDROCHLORIDE 150 MG/1
150 CAPSULE, EXTENDED RELEASE ORAL DAILY
Qty: 30 CAPSULE | Refills: 4 | Status: SHIPPED | OUTPATIENT
Start: 2024-09-19 | End: 2025-02-16

## 2024-09-19 RX ORDER — GABAPENTIN 400 MG/1
400 CAPSULE ORAL 3 TIMES DAILY
Qty: 90 CAPSULE | Refills: 4 | Status: SHIPPED | OUTPATIENT
Start: 2024-09-19 | End: 2025-02-16

## 2024-09-19 RX ORDER — ZOLPIDEM TARTRATE 10 MG/1
10 TABLET ORAL
Qty: 30 TABLET | Refills: 4 | Status: SHIPPED | OUTPATIENT
Start: 2024-09-29 | End: 2025-02-26

## 2024-09-19 RX ORDER — BUPROPION HYDROCHLORIDE 300 MG/1
300 TABLET ORAL DAILY
Qty: 30 TABLET | Refills: 4 | Status: SHIPPED | OUTPATIENT
Start: 2024-09-19 | End: 2025-02-16

## 2024-09-19 RX ORDER — VENLAFAXINE HYDROCHLORIDE 75 MG/1
75 CAPSULE, EXTENDED RELEASE ORAL DAILY
Qty: 30 CAPSULE | Refills: 4 | Status: SHIPPED | OUTPATIENT
Start: 2024-09-19 | End: 2025-02-16

## 2024-09-19 NOTE — TELEPHONE ENCOUNTER
Please add patient to wait list for talk therapy per provider note below.    Please refer patient for psychotherapy

## 2024-09-19 NOTE — BH TREATMENT PLAN
"TREATMENT PLAN (Medication Management Only)        [facility]    Name/Date of Birth/MRN:  Carlito Palma 59 y.o. 1965 MRN: [id number]  Date of Treatment Plan: September 19, 2024  Diagnosis/Diagnoses:   1. Major depressive disorder, recurrent, severe without psychotic features (HCC)    2. ALYSHA (generalized anxiety disorder)    3. Other insomnia      Strengths/Personal Resources for Self-Care: \"being resilient\".  Area/Areas of need (in own words): \"staying away from COVID\".  1. Long Term Goal:   improve control of anxiety, improve control of depression  Target Date: 2 months - 11/19/2024  Person/Persons responsible for completion of goal: Carlito  2.  Short Term Objective (s) - How will we reach this goal?:   A.  Provider new recommended medication/dosage changes and/or continue medication(s): increase Wellbutrin XL, continue all other medications (Effexor XR, Neurontin, and Ambien).  B.  N/A.  C.  N/A.  Target Date: 2 months - 11/19/2024  Person/Persons Responsible for Completion of Goal: Carlito   Progress Towards Goals: minimal progress  Treatment Modality: medication management every 2 months, referral for individual psychotherapy  Review due 180 days from date of this plan: 6 months - 3/19/2025  Expected length of service: ongoing treatment unless revised  My Physician/PA/NP and I have developed this plan together and I agree to work on the goals and objectives. I understand the treatment goals that were developed for my treatment.  Electronic Signatures: on file (unless signed below)    Ivette Bass MD 09/19/24  "

## 2024-09-19 NOTE — ASSESSMENT & PLAN NOTE
Continues to have anxiety symptoms  Continue Neurontin 400 mg three times a day to control anxiety symptoms  Orders:    gabapentin (NEURONTIN) 400 mg capsule; Take 1 capsule (400 mg total) by mouth 3 (three) times a day    venlafaxine (EFFEXOR-XR) 75 mg 24 hr capsule; Take 1 capsule (75 mg total) by mouth daily Take with Effexor  mg for a total dose of 225 mg daily    venlafaxine (EFFEXOR-XR) 150 mg 24 hr capsule; Take 1 capsule (150 mg total) by mouth daily Take with Effexor XR 75 mg for a total dose of 225 mg daily

## 2024-09-19 NOTE — ASSESSMENT & PLAN NOTE
Ongoing symptoms  Continue Effexor  mg daily to control depressive symptoms  Increase Wellbutrin XL to 300 mg daily also to control depressive symptoms  Orders:    venlafaxine (EFFEXOR-XR) 75 mg 24 hr capsule; Take 1 capsule (75 mg total) by mouth daily Take with Effexor  mg for a total dose of 225 mg daily    venlafaxine (EFFEXOR-XR) 150 mg 24 hr capsule; Take 1 capsule (150 mg total) by mouth daily Take with Effexor XR 75 mg for a total dose of 225 mg daily    buPROPion (Wellbutrin XL) 300 mg 24 hr tablet; Take 1 tablet (300 mg total) by mouth daily

## 2024-09-19 NOTE — ASSESSMENT & PLAN NOTE
Continue Ambien 10 mg at bedtime as needed to help with insomnia  Orders:    zolpidem (AMBIEN) 10 mg tablet; Take 1 tablet (10 mg total) by mouth daily at bedtime as needed for sleep Do not start before September 29, 2024.

## 2024-09-30 ENCOUNTER — TELEPHONE (OUTPATIENT)
Dept: PSYCHIATRY | Facility: CLINIC | Age: 59
End: 2024-09-30

## 2024-09-30 NOTE — TELEPHONE ENCOUNTER
Spoke with patient and scheduled 2 month follow up for 11/22 2PM and another 2 months for 1/20 230PM.

## 2024-10-07 ENCOUNTER — TELEPHONE (OUTPATIENT)
Dept: FAMILY MEDICINE CLINIC | Facility: CLINIC | Age: 59
End: 2024-10-07

## 2024-11-13 NOTE — PSYCH
MEDICATION MANAGEMENT NOTE        [facility]      Name and Date of Birth:  Carlito Palma 59 y.o. 1965 MRN: [id number]    Insurance: Payor: MEDICARE / Plan: MEDICARE A AND B / Product Type: Medicare A & B Fee for Service /     Date of Visit: November 22, 2024    Reason for Visit:   Chief Complaint   Patient presents with    Medication Management    Follow-up    Virtual Regular Visit     Assessment & Plan  Major depressive disorder, recurrent, severe without psychotic features (HCC)  Continues to report depressive symptoms  Continue Effexor  mg daily to control depressive symptoms  Continue Wellbutrin  mg daily also to control depressive symptoms       ALYSHA (generalized anxiety disorder)  Still has anxiety  Continue Neurontin 400 mg three times a day to control anxiety symptoms       Other insomnia  Sleep is improved  Continue Ambien 10 mg at bedtime as needed to help with insomnia       Treatment Recommendations/Precautions:    Follows with family physician for yearly physical exam, arthritis, chronic pain, gastrointestinal issues, sleep apnea, and long COVID symptoms  Aware of 24 hour and weekend coverage for urgent situations accessed by calling [facility] main practice number  Medication management every 2 months  I am scheduling this patient out for greater than 3 months: No    Medications Risks/Benefits:      Risks, Benefits And Possible Side Effects Of Medications:    Risks, benefits, and possible side effects of medications explained to Carlito and he verbalizes understanding and agreement for treatment.    Controlled Medication Discussion:     Carlito has been filling controlled prescriptions on time as prescribed according to [place] Prescription Drug Monitoring Program    SUBJECTIVE:    Carlito is seen today for a follow up for Major Depressive Disorder, Generalized Anxiety Disorder, and insomnia. He has experienced  ongoing symptoms since the last visit. He still feels depressed, rates mood as 9 on a scale of 1 (best mood) to 10 (worst mood). He continues to experience anxiety symptoms. He still has concerns about long COVID symptoms.    He denies any suicidal ideation, intent or plan at present; denies any homicidal ideation, intent or plan at present.    He has no auditory hallucinations, denies any visual hallucinations, has no delusional thinking.    He denies any side effects from current psychiatric medications.    After reporting his current symptoms Carlito got disconnected form video khalif and could not connect back despite 3 additional links sent. Also, he did not answer his phone when phone call was placed 3 times to him and his voicemail box was full, so message could not be left.     HPI ROS Appetite Changes and Sleep:     He reports normal sleep, normal appetite, recent weight gain (10 lbs), low energy    Current Rating Scores:     None completed today. Patient got disconnected from video call and could not connect back    Review Of Systems: Could not be completed due to poor video connection     Past Psychiatric History: (unchanged information from previous note copied and updated)    Past Inpatient Psychiatric Treatment:   No history of past inpatient psychiatric admissions.  Past Outpatient Psychiatric Treatment:    In outpatient treatment at [facility] since 2/2017.  Past Suicide Attempts: no  Past Violent Behavior: no  Past Psychiatric Medication Trials: Lexapro, Zoloft, Wellbutrin XL, Trazodone, Abilify, Ambien, Cymbalta, Buspar and Melatonin    Traumatic History: (unchanged information from previous note copied and updated)    Abuse: no history of sexual abuse, no history of physical abuse  Other Traumatic Events: none     Past Medical History:    Past Medical History:   Diagnosis Date    Adrenal adenoma     Arthritis     Carpal tunnel syndrome     Chronic back pain     Chronic pain  disorder     COVID     CPAP (continuous positive airway pressure) dependence     Cubital tunnel syndrome     Depression     Fatigue     Fatty liver     Folliculitis     Hemorrhoid     Liver lesion     Lumbago     Morbid obesity (HCC)     Myofascial pain syndrome     Neuropathy     Obesity 2015    Scalp lesion     Sleep apnea     CPAP dependance    Sleep difficulties     Thickening of wall of gallbladder     Tinea capitis     Ulnar 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     Umbilical hernia     Vitamin D deficiency         Past Surgical History:   Procedure Laterality Date    CARPAL TUNNEL RELEASE Bilateral     CHOLECYSTECTOMY      COLONOSCOPY      CYST REMOVAL      Scalp    EGD      EYE MUSCLE SURGERY Bilateral     as a child for amblyopia    HAND SURGERY      HEMORROIDECTOMY      HERNIA REPAIR      KNEE ARTHROSCOPY Right     PATELLA SURGERY Right     scraped out scar tissue per pt     TN COLONOSCOPY FLX DX W/COLLJ SPEC WHEN PFRMD N/A 2/10/2016    Procedure: COLONOSCOPY;  Surgeon: YANIRA Gastelum MD;  [facility]: [facility];  Service: Colorectal    TN EGD TRANSORAL BIOPSY SINGLE/MULTIPLE N/A 8/23/2017    Procedure: ESOPHAGOGASTRODUODENOSCOPY (EGD) with bx;  Surgeon: Sung Riley MD;  Location: [facility];  Service: Bariatrics    TN EXC B9 LESION MRGN XCP SK TG S/N/H/F/G 0.6-1.0CM N/A 12/8/2016    Procedure: EXCISION OF SCALP LESION;  Surgeon: Will Toledo MD;  Location: [facility];  Service: General    TN HEMORRHOIDECTOMY INT & XTRNL 2/> COLUMN/MICHAELLE N/A 2/12/2016    Procedure: HEMORRHOIDECTOMY EXCISION, EUA, Anal fistulotomy;  Surgeon: YANIRA Gastelum MD;  [facility];  Service: Colorectal    TN LAPAROSCOPY SURG CHOLECYSTECTOMY N/A 3/11/2021    Procedure: CHOLECYSTECTOMY LAPAROSCOPIC;  Surgeon: Charbel Durbin MD;  Location: [facility];  Service: General    TN RPR UMBILICAL HRNA 5 YRS/> REDUCIBLE N/A 3/11/2021    Procedure: REPAIR HERNIA UMBILICAL RECURRENT;  Surgeon: Charbel Durbin MD;  Location: [facility];  Service:  General    UMBILICAL HERNIA REPAIR       Allergies   Allergen Reactions    Penicillins Rash       Current Outpatient Medications:    Current Outpatient Medications   Medication Sig Dispense Refill    buPROPion (Wellbutrin XL) 300 mg 24 hr tablet Take 1 tablet (300 mg total) by mouth daily 30 tablet 4    gabapentin (NEURONTIN) 400 mg capsule Take 1 capsule (400 mg total) by mouth 3 (three) times a day 90 capsule 4    venlafaxine (EFFEXOR-XR) 150 mg 24 hr capsule Take 1 capsule (150 mg total) by mouth daily Take with Effexor XR 75 mg for a total dose of 225 mg daily 30 capsule 4    venlafaxine (EFFEXOR-XR) 75 mg 24 hr capsule Take 1 capsule (75 mg total) by mouth daily Take with Effexor  mg for a total dose of 225 mg daily 30 capsule 4    zolpidem (AMBIEN) 10 mg tablet Take 1 tablet (10 mg total) by mouth daily at bedtime as needed for sleep Do not start before September 29, 2024. 30 tablet 4     No current facility-administered medications for this visit.       Substance Abuse History:    Social History     Substance and Sexual Activity   Alcohol Use Not Currently    Comment: Social alcohol use     Social History     Substance and Sexual Activity   Drug Use No       Social History:    Social History     Socioeconomic History    Marital status: Legally      Spouse name: Not on file    Number of children: 1    Years of education: some college    Highest education level: Some college, no degree   Occupational History    Occupation: on disability, works part time in food delivery   Tobacco Use    Smoking status: Never    Smokeless tobacco: Never   Vaping Use    Vaping status: Never Used   Substance and Sexual Activity    Alcohol use: Not Currently     Comment: Social alcohol use    Drug use: No    Sexual activity: Yes     Partners: Female     Birth control/protection: Condom Male   Other Topics Concern    Not on file   Social History Narrative    Education: some college    Learning Disabilities: none     Marital History:     Children: 1 adult son    Living Arrangement: lives in home with stepson, girlfriend and girlfriend's son    Occupational History: on disability, employed part time in food delivery, 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    Functioning Relationships: good support system    Legal History: none     History: None        Most recent tobacco use screenin2018     Social Drivers of Health     Financial Resource Strain: Medium Risk (2024)    Overall Financial Resource Strain (CARDIA)     Difficulty of Paying Living Expenses: Somewhat hard   Food Insecurity: No Food Insecurity (2024)    Hunger Vital Sign     Worried About Running Out of Food in the Last Year: Never true     Ran Out of Food in the Last Year: Never true   Transportation Needs: No Transportation Needs (2024)    PRAPARE - Transportation     Lack of Transportation (Medical): No     Lack of Transportation (Non-Medical): No   Physical Activity: Inactive (2024)    Exercise Vital Sign     Days of Exercise per Week: 0 days     Minutes of Exercise per Session: 0 min   Stress: Stress Concern Present (2024)    [employer] - Occupational Stress Questionnaire     Feeling of Stress : To some extent   Social Connections: Socially Isolated (2024)    Social Connection and Isolation Panel [NHANES]     Frequency of Communication with Friends and Family: More than three times a week     Frequency of Social Gatherings with Friends and Family: Twice a week     Attends Anglican Services: Never     Active Member of Clubs or Organizations: No     Attends Club or Organization Meetings: Never     Marital Status:    Intimate Partner Violence: Not At Risk (2024)    Humiliation, Afraid, Rape, and Kick questionnaire     Fear of Current or Ex-Partner: No     Emotionally Abused: No     Physically Abused: No     Sexually  "Abused: No   Housing Stability: Low Risk  (11/22/2024)    Housing Stability Vital Sign     Unable to Pay for Housing in the Last Year: No     Number of Times Moved in the Last Year: 1     Homeless in the Last Year: No       Family Psychiatric History:     Family History   Problem Relation Age of Onset    Cancer Mother     Diabetes type II Father     Cancer Father     Depression Sister     Suicide Attempts Sister     No Known Problems Sister     Substance Abuse Neg Hx        History Review: The following portions of the patient's history were reviewed and updated as appropriate: allergies, current medications, past family history, past medical history, past social history, past surgical history, and problem list.         OBJECTIVE:     Vital signs in last 24 hours:    Vitals:    11/22/24 1358   Weight: 136 kg (300 lb)   Height: 5' 10\" (1.778 m)       Mental Status Evaluation:    Appearance age appropriate, casually dressed   Behavior cooperative, appears anxious   Speech normal rate, normal volume, normal pitch   Mood depressed, anxious   Affect blunted   Thought Processes organized, goal directed   Associations intact associations   Thought Content no overt delusions   Perceptual Disturbances: no auditory hallucinations, no visual hallucinations   Abnormal Thoughts  Risk Potential Suicidal ideation - None  Homicidal ideation - None  Potential for aggression - No   Orientation oriented to person, place, time/date, and situation   Memory recent and remote memory grossly intact   Consciousness alert and awake   Attention Span Concentration Span attention span and concentration appear shorter than expected for age   Intellect appears to be of average intelligence   Insight intact   Judgement intact   Muscle Strength and  Gait normal muscle strength and normal muscle tone, normal gait and normal balance   Motor activity no abnormal movements   Language no difficulty naming common objects, no difficulty repeating a " phrase, no difficulty writing a sentence   Fund of Knowledge adequate knowledge of current events  adequate fund of knowledge regarding past history  adequate fund of knowledge regarding vocabulary    Pain Not assessed   Pain Scale N/A       Laboratory Results: I have personally reviewed all pertinent laboratory/tests results    CBC:   Lab Results   Component Value Date    WBC 6.75 04/04/2023    RBC 5.01 04/04/2023    HGB 14.7 04/04/2023    HCT 43.2 04/04/2023    MCV 86 04/04/2023     04/04/2023    MCH 29.3 04/04/2023    MCHC 34.0 04/04/2023    RDW 13.1 04/04/2023    MPV 9.6 04/04/2023    NEUTROABS 3.94 04/04/2023   CMP:   Lab Results   Component Value Date    SODIUM 137 04/04/2023    K 3.9 04/04/2023     (H) 04/04/2023    CO2 26 04/04/2023    AGAP 1 (L) 04/04/2023    BUN 18 04/04/2023    CREATININE 1.11 04/04/2023    GLUC 109 06/03/2020    GLUF 116 (H) 04/04/2023    CALCIUM 8.5 04/04/2023    AST 15 04/04/2023    ALT 28 04/04/2023    ALKPHOS 62 04/04/2023    TP 6.7 04/04/2023    ALB 3.5 04/04/2023    TBILI 0.87 04/04/2023    EGFR 73 04/04/2023   Lipid Profile:   Lab Results   Component Value Date    CHOLESTEROL 159 04/04/2023    HDL 32 (L) 04/04/2023    TRIG 134 04/04/2023    LDLCALC 100 04/04/2023    NONHDLC 127 04/04/2023   Hemoglobin A1C:   Lab Results   Component Value Date    HGBA1C 5.9 (H) 04/04/2023     04/04/2023     Psychotherapy Provided:     Individual psychotherapy provided: No     Treatment Plan:    Completed and signed during the session: Not applicable - Treatment Plan not due at this session    Note Share:    This note was shared with patient.    Visit Time    Visit Start Time: 2:01 PM  Visit Stop Time: 2:16 PM  Total Visit Duration:  15 minutes    Ivette Bass MD 11/22/24    Virtual Regular Visit    Verification of patient location:    Patient is located at Other (car - not driving) in the following state in which I hold an active license [place]    Assessment/Plan:    Problem  List Items Addressed This Visit          Behavioral Health    Major depressive disorder, recurrent, severe without psychotic features (HCC) - Primary (Chronic)    ALYSHA (generalized anxiety disorder) (Chronic)       Neurology/Sleep    Other insomnia (Chronic)     Reason for visit is   Chief Complaint   Patient presents with    Medication Management    Follow-up    Virtual Regular Visit     Encounter provider Ivette Bass MD    Recent Visits  No visits were found meeting these conditions.  Showing recent visits within past 7 days and meeting all other requirements  Today's Visits  Date Type Provider Dept   11/22/24 Telemedicine Ivette Bass MD [facility]   Showing today's visits and meeting all other requirements  Future Appointments  No visits were found meeting these conditions.  Showing future appointments within next 150 days and meeting all other requirements     The patient was identified by name and date of birth. Carlito FINN Minerva was informed that this is a telemedicine visit and that the visit is being conducted through the Epic Embedded platform. He agrees to proceed..  My office door was closed. No one else was in the room.  He acknowledged consent and understanding of privacy and security of the video platform. The patient has agreed to participate and understands they can discontinue the visit at any time.    Patient is aware this is a billable service.

## 2024-11-22 ENCOUNTER — TELEMEDICINE (OUTPATIENT)
Dept: PSYCHIATRY | Facility: CLINIC | Age: 59
End: 2024-11-22
Payer: MEDICARE

## 2024-11-22 VITALS — BODY MASS INDEX: 42.95 KG/M2 | HEIGHT: 70 IN | WEIGHT: 300 LBS

## 2024-11-22 DIAGNOSIS — F33.2 MAJOR DEPRESSIVE DISORDER, RECURRENT, SEVERE WITHOUT PSYCHOTIC FEATURES (HCC): Primary | Chronic | ICD-10-CM

## 2024-11-22 DIAGNOSIS — G47.09 OTHER INSOMNIA: Chronic | ICD-10-CM

## 2024-11-22 DIAGNOSIS — F41.1 GAD (GENERALIZED ANXIETY DISORDER): Chronic | ICD-10-CM

## 2024-11-22 PROCEDURE — 99213 OFFICE O/P EST LOW 20 MIN: CPT | Performed by: PSYCHIATRY & NEUROLOGY

## 2024-11-22 NOTE — ASSESSMENT & PLAN NOTE
Continues to report depressive symptoms  Continue Effexor  mg daily to control depressive symptoms  Continue Wellbutrin  mg daily also to control depressive symptoms

## 2024-11-25 ENCOUNTER — TELEPHONE (OUTPATIENT)
Dept: PSYCHIATRY | Facility: CLINIC | Age: 59
End: 2024-11-25

## 2024-11-25 NOTE — TELEPHONE ENCOUNTER
Called and was unable to leave message for patient as voicemail box is full to return a call to [phone] and schedule 4 month VIRTUAL follow up with provider (Dr Bass). Please schedule upon return call. Thank you.

## 2024-11-26 ENCOUNTER — TELEPHONE (OUTPATIENT)
Dept: GASTROENTEROLOGY | Facility: CLINIC | Age: 59
End: 2024-11-26

## 2024-11-26 NOTE — TELEPHONE ENCOUNTER
I tried to lmm for pt to schedule a recall colon but mailbox is full . Will send a recall ltr. Sb

## 2024-12-05 ENCOUNTER — TELEPHONE (OUTPATIENT)
Age: 59
End: 2024-12-05

## 2024-12-05 NOTE — TELEPHONE ENCOUNTER
"I do not have any sooner openings available at this time. Please call Carlito and let him know that he needs to call frequently for cancellations, please also place him on a cancellation list for a sooner appointment and let him know that I will be watching for any cancellations for a sooner appointment.     Note from the interrupted Virtual Visit on 11/22/24 indicates that he was called and sent multiple links to reconnect \"After reporting his current symptoms Carlito got disconnected from video call and could not connect back despite 3 additional links sent. Also, he did not answer his phone when phone call was placed 3 times to him and his voicemail box was full, so message could not be left\".   "

## 2024-12-05 NOTE — TELEPHONE ENCOUNTER
Pt called and states he lost connection with Dr Bass at last appt and did not get to complete his appt. Pt is scheduled for 1/20/25. He requested an appt sooner than that. Writer informed provider's schedule is booked until his next appt. Writer informed that a message would be put through to the provider and the office. Pt requesting if Provider could return his call.

## 2024-12-09 ENCOUNTER — TELEPHONE (OUTPATIENT)
Age: 59
End: 2024-12-09

## 2024-12-09 NOTE — TELEPHONE ENCOUNTER
Patient is calling regarding cancelling an appointment.    Date/Time: 12/9 3 pm    Reason: not available    Patient was rescheduled: YES [x] NO []  If yes, when was Patient reschedule for: 1/30    Patient requesting call back to reschedule: YES [] NO [x]

## 2024-12-09 NOTE — TELEPHONE ENCOUNTER
Patient called to cancel appt made by provider on 12/9/24. Patient was rs, but provider asked that the appt be canceled.

## 2024-12-09 NOTE — TELEPHONE ENCOUNTER
Attempted to call twice and lvm but voicemail is full.  Writer message pt via [facility] and inform that Dr Bass have an open slot today 12/9/2024 @ 3:00 pm. We reserved that slot for him,  so as of now  he is scheduled to see Dr Bass at 3PM virtual. He can also come in office he want.       If pt calls back please informed.

## 2025-02-25 DIAGNOSIS — G47.09 OTHER INSOMNIA: Primary | Chronic | ICD-10-CM

## 2025-02-25 RX ORDER — ZOLPIDEM TARTRATE 10 MG/1
10 TABLET ORAL
Qty: 30 TABLET | Refills: 0 | OUTPATIENT
Start: 2025-02-25 | End: 2025-07-25

## 2025-02-25 NOTE — TELEPHONE ENCOUNTER
Reason for call:   [x] Refill   [] Prior Auth  [] Other:     Office:   [] PCP/Provider -   [x] Specialty/Provider -     Medication: zolpidem (AMBIEN) 10 mg       Dose/Frequency: Take 1 tablet (10 mg total) by mouth daily at bedtime as needed for sleep Do not start before September 29, 2024.     Quantity: 30    Pharmacy: [facility]/pharm[facility] #[id number]  [place] - [address]        Does the patient have enough for 3 days?   [x] Yes   [] No - Send as HP to POD

## 2025-02-26 RX ORDER — ZOLPIDEM TARTRATE 10 MG/1
10 TABLET ORAL
Qty: 30 TABLET | Refills: 0 | Status: SHIPPED | OUTPATIENT
Start: 2025-02-26 | End: 2025-03-06 | Stop reason: SDUPTHER

## (undated) DEVICE — VISUALIZATION SYSTEM: Brand: CLEARIFY

## (undated) DEVICE — SUT VICRYL 3-0 SH 27 IN J416H

## (undated) DEVICE — SUT MONOCRYL 4-0 PS-2 27 IN Y426H

## (undated) DEVICE — HYDROPHILIC WOUND DRESSING WITH ZINC PLUS VITAMINS A AND B6.: Brand: DERMAGRAN®-B

## (undated) DEVICE — SUT VICRYL 0 UR-6 27 IN J603H

## (undated) DEVICE — 3M™ STERI-STRIP™ COMPOUND BENZOIN TINCTURE 40 BAGS/CARTON 4 CARTONS/CASE C1544: Brand: 3M™ STERI-STRIP™

## (undated) DEVICE — INTENDED FOR TISSUE SEPARATION, AND OTHER PROCEDURES THAT REQUIRE A SHARP SURGICAL BLADE TO PUNCTURE OR CUT.: Brand: BARD-PARKER SAFETY BLADES SIZE 15, STERILE

## (undated) DEVICE — TROCAR: Brand: KII® SLEEVE

## (undated) DEVICE — ADHESIVE SKIN HIGH VISCOSITY EXOFIN 1ML

## (undated) DEVICE — PAD GROUNDING ADULT

## (undated) DEVICE — SURGICEL 4 X 8

## (undated) DEVICE — PENCIL ELECTROSURG E-Z CLEAN -0035H

## (undated) DEVICE — CHLORAPREP HI-LITE 26ML ORANGE

## (undated) DEVICE — GLOVE INDICATOR PI UNDERGLOVE SZ 8 BLUE

## (undated) DEVICE — TROCARS: Brand: KII® BALLOON BLUNT TIP SYSTEM

## (undated) DEVICE — PLUMEPEN PRO 10FT

## (undated) DEVICE — GAUZE SPONGES,USP TYPE VII GAUZE, 12 PLY: Brand: CURITY

## (undated) DEVICE — IRRIG ENDO FLO TUBING

## (undated) DEVICE — 3M™ TEGADERM™ TRANSPARENT FILM DRESSING FRAME STYLE, 1626W, 4 IN X 4-3/4 IN (10 CM X 12 CM), 50/CT 4CT/CASE: Brand: 3M™ TEGADERM™

## (undated) DEVICE — NEEDLE 22 G X 1 1/2 SAFETY

## (undated) DEVICE — TROCAR: Brand: KII FIOS FIRST ENTRY

## (undated) DEVICE — LIGAMAX 5 MM ENDOSCOPIC MULTIPLE CLIP APPLIER: Brand: LIGAMAX

## (undated) DEVICE — GLOVE SRG BIOGEL ECLIPSE 7.5

## (undated) DEVICE — GAUZE SPONGES,16 PLY: Brand: CURITY

## (undated) DEVICE — 3M™ STERI-STRIP™ REINFORCED ADHESIVE SKIN CLOSURES, R1547, 1/2 IN X 4 IN (12 MM X 100 MM), 6 STRIPS/ENVELOPE: Brand: 3M™ STERI-STRIP™

## (undated) DEVICE — SINGLE-USE BIOPSY FORCEPS: Brand: RADIAL JAW 4

## (undated) DEVICE — SUT ETHIBOND 0 CT-2 30 IN X412H

## (undated) DEVICE — PACK PBDS LAP CHOLE RF